# Patient Record
Sex: FEMALE | Race: WHITE | Employment: OTHER | URBAN - NONMETROPOLITAN AREA
[De-identification: names, ages, dates, MRNs, and addresses within clinical notes are randomized per-mention and may not be internally consistent; named-entity substitution may affect disease eponyms.]

---

## 2017-07-24 ENCOUNTER — OFFICE VISIT (OUTPATIENT)
Dept: FAMILY MEDICINE CLINIC | Age: 32
End: 2017-07-24
Payer: MEDICARE

## 2017-07-24 VITALS
SYSTOLIC BLOOD PRESSURE: 114 MMHG | WEIGHT: 211 LBS | HEART RATE: 74 BPM | HEIGHT: 64 IN | BODY MASS INDEX: 36.02 KG/M2 | DIASTOLIC BLOOD PRESSURE: 80 MMHG

## 2017-07-24 DIAGNOSIS — F90.2 ATTENTION DEFICIT HYPERACTIVITY DISORDER (ADHD), COMBINED TYPE: Primary | ICD-10-CM

## 2017-07-24 DIAGNOSIS — F17.200 CURRENT EVERY DAY SMOKER: ICD-10-CM

## 2017-07-24 DIAGNOSIS — F90.2 ATTENTION DEFICIT HYPERACTIVITY DISORDER (ADHD), COMBINED TYPE: ICD-10-CM

## 2017-07-24 PROCEDURE — G8427 DOCREV CUR MEDS BY ELIG CLIN: HCPCS | Performed by: FAMILY MEDICINE

## 2017-07-24 PROCEDURE — 4004F PT TOBACCO SCREEN RCVD TLK: CPT | Performed by: FAMILY MEDICINE

## 2017-07-24 PROCEDURE — 99203 OFFICE O/P NEW LOW 30 MIN: CPT | Performed by: FAMILY MEDICINE

## 2017-07-24 PROCEDURE — G8417 CALC BMI ABV UP PARAM F/U: HCPCS | Performed by: FAMILY MEDICINE

## 2017-07-24 RX ORDER — DEXTROAMPHETAMINE SACCHARATE, AMPHETAMINE ASPARTATE MONOHYDRATE, DEXTROAMPHETAMINE SULFATE AND AMPHETAMINE SULFATE 2.5; 2.5; 2.5; 2.5 MG/1; MG/1; MG/1; MG/1
10 CAPSULE, EXTENDED RELEASE ORAL DAILY
Qty: 30 CAPSULE | Refills: 0 | Status: SHIPPED | OUTPATIENT
Start: 2017-07-24 | End: 2017-08-22 | Stop reason: SDUPTHER

## 2017-07-24 RX ORDER — DEXTROAMPHETAMINE SACCHARATE, AMPHETAMINE ASPARTATE MONOHYDRATE, DEXTROAMPHETAMINE SULFATE AND AMPHETAMINE SULFATE 2.5; 2.5; 2.5; 2.5 MG/1; MG/1; MG/1; MG/1
10 CAPSULE, EXTENDED RELEASE ORAL DAILY
Qty: 30 CAPSULE | Refills: 0 | Status: SHIPPED | OUTPATIENT
Start: 2017-07-24 | End: 2017-07-24 | Stop reason: SDUPTHER

## 2017-07-24 ASSESSMENT — ENCOUNTER SYMPTOMS
COLOR CHANGE: 0
EYE DISCHARGE: 0
SHORTNESS OF BREATH: 0
CHEST TIGHTNESS: 0
VOMITING: 0
NAUSEA: 0
EYE REDNESS: 0

## 2017-08-22 DIAGNOSIS — F90.2 ATTENTION DEFICIT HYPERACTIVITY DISORDER (ADHD), COMBINED TYPE: ICD-10-CM

## 2017-08-22 RX ORDER — DEXTROAMPHETAMINE SACCHARATE, AMPHETAMINE ASPARTATE MONOHYDRATE, DEXTROAMPHETAMINE SULFATE AND AMPHETAMINE SULFATE 2.5; 2.5; 2.5; 2.5 MG/1; MG/1; MG/1; MG/1
10 CAPSULE, EXTENDED RELEASE ORAL DAILY
Qty: 30 CAPSULE | Refills: 0 | Status: SHIPPED | OUTPATIENT
Start: 2017-08-24 | End: 2017-09-22 | Stop reason: SDUPTHER

## 2017-09-22 DIAGNOSIS — F90.2 ATTENTION DEFICIT HYPERACTIVITY DISORDER (ADHD), COMBINED TYPE: ICD-10-CM

## 2017-09-22 RX ORDER — DEXTROAMPHETAMINE SACCHARATE, AMPHETAMINE ASPARTATE MONOHYDRATE, DEXTROAMPHETAMINE SULFATE AND AMPHETAMINE SULFATE 2.5; 2.5; 2.5; 2.5 MG/1; MG/1; MG/1; MG/1
10 CAPSULE, EXTENDED RELEASE ORAL DAILY
Qty: 30 CAPSULE | Refills: 0 | Status: SHIPPED | OUTPATIENT
Start: 2017-09-22 | End: 2017-10-20 | Stop reason: SDUPTHER

## 2017-10-02 ENCOUNTER — HOSPITAL ENCOUNTER (EMERGENCY)
Age: 32
Discharge: HOME | End: 2017-10-02
Attending: NURSE PRACTITIONER
Payer: MEDICARE

## 2017-10-02 VITALS
DIASTOLIC BLOOD PRESSURE: 76 MMHG | SYSTOLIC BLOOD PRESSURE: 110 MMHG | HEIGHT: 64 IN | BODY MASS INDEX: 33.63 KG/M2 | TEMPERATURE: 98.1 F | WEIGHT: 197 LBS | RESPIRATION RATE: 16 BRPM | OXYGEN SATURATION: 98 % | HEART RATE: 98 BPM

## 2017-10-02 DIAGNOSIS — J98.01 BRONCHOSPASM, ACUTE: ICD-10-CM

## 2017-10-02 DIAGNOSIS — J30.9 ALLERGIC RHINITIS, UNSPECIFIED ALLERGIC RHINITIS TRIGGER, UNSPECIFIED RHINITIS SEASONALITY: Primary | ICD-10-CM

## 2017-10-02 PROCEDURE — 99212 OFFICE O/P EST SF 10 MIN: CPT

## 2017-10-02 PROCEDURE — 99213 OFFICE O/P EST LOW 20 MIN: CPT | Performed by: NURSE PRACTITIONER

## 2017-10-02 RX ORDER — ALBUTEROL SULFATE 90 UG/1
2 AEROSOL, METERED RESPIRATORY (INHALATION) EVERY 4 HOURS PRN
Qty: 1 INHALER | Refills: 0 | Status: SHIPPED | OUTPATIENT
Start: 2017-10-02 | End: 2018-12-29 | Stop reason: ALTCHOICE

## 2017-10-02 RX ORDER — METHYLPREDNISOLONE 4 MG/1
TABLET ORAL
Qty: 1 KIT | Refills: 0 | Status: SHIPPED | OUTPATIENT
Start: 2017-10-02 | End: 2017-10-08

## 2017-10-02 ASSESSMENT — ENCOUNTER SYMPTOMS
SINUS PRESSURE: 0
NAUSEA: 0
VOMITING: 0
STRIDOR: 0
COUGH: 1
SORE THROAT: 0
WHEEZING: 0
CHEST TIGHTNESS: 1
DIARRHEA: 0
VOICE CHANGE: 1
TROUBLE SWALLOWING: 0
SHORTNESS OF BREATH: 1

## 2017-10-02 NOTE — ED PROVIDER NOTES
in her mother; Stroke in her father. SOCIAL HISTORY     Patient  reports that she has been smoking Cigarettes. She has been smoking about 0.50 packs per day. She has never used smokeless tobacco. She reports that she does not drink alcohol or use illicit drugs. PHYSICAL EXAM     ED TRIAGE VITALS  BP: 110/76, Temp: 98.1 °F (36.7 °C), Pulse: 98, Resp: 16, SpO2: 98 %  Physical Exam   Constitutional: She appears well-developed and well-nourished. No distress. HENT:   Head: Normocephalic and atraumatic. Right Ear: External ear normal. A middle ear effusion is present. Left Ear: External ear normal. A middle ear effusion is present. Nose: Mucosal edema present. Right sinus exhibits no maxillary sinus tenderness and no frontal sinus tenderness. Left sinus exhibits no maxillary sinus tenderness and no frontal sinus tenderness. Mouth/Throat: Uvula is midline and mucous membranes are normal. Posterior oropharyngeal edema present. No oropharyngeal exudate, posterior oropharyngeal erythema or tonsillar abscesses. Eyes: Conjunctivae are normal.   Neck: Neck supple. Cardiovascular: Normal rate and regular rhythm. Exam reveals no gallop and no friction rub. No murmur heard. Pulmonary/Chest: Effort normal and breath sounds normal. No respiratory distress. She has no wheezes. Lymphadenopathy:     She has no cervical adenopathy. Neurological: She is alert. Skin: Skin is warm and dry. Psychiatric: She has a normal mood and affect. Nursing note and vitals reviewed. DIAGNOSTIC RESULTS   Labs:No results found for this visit on 10/02/17. IMAGING:    URGENT CARE COURSE:     Vitals:    10/02/17 1214   BP: 110/76   Pulse: 98   Resp: 16   Temp: 98.1 °F (36.7 °C)   TempSrc: Oral   SpO2: 98%   Weight: 197 lb (89.4 kg)   Height: 5' 4\" (1.626 m)       Medications - No data to display  PROCEDURES:  None  FINAL IMPRESSION      1.  Allergic rhinitis, unspecified allergic rhinitis trigger, unspecified rhinitis seasonality    2. Bronchospasm, acute        DISPOSITION/PLAN   DISPOSITION Decision to Discharge   Patient presents with symptoms consistent with allergic rhinitis and bronchospasm. Patient was not wheezing but tells me she wakes up with tightness in her chest and it gets worse throughout the day. I talked with her about stopping tobacco use. She was receptive. I'm going to send her with prescriptions for an albuterol HFA and a Medrol Dosepak as directed. I suggested she follow-up with her primary care provider if symptoms don't improve or any other concerns. PATIENT REFERRED TO:  Helena Owen MD  [address] [place] [address]  [phone]    Schedule an appointment as soon as possible for a visit  if not improving    DISCHARGE MEDICATIONS:  New Prescriptions    ALBUTEROL SULFATE HFA (PROAIR HFA) 108 (90 BASE) MCG/ACT INHALER    Inhale 2 puffs into the lungs every 4 hours as needed for Wheezing    METHYLPREDNISOLONE (MEDROL, BRITTANY,) 4 MG TABLET    Take by mouth.      Current Discharge Medication List          AUDRA Chaparro, [address] [facility]  10/02/17 1129

## 2017-10-02 NOTE — ED AVS SNAPSHOT
After Visit Summary  (Discharge Instructions)    Medication List for Home    Based on the information you provided to us as well as any changes during this visit, the following is your updated medication list.  Compare this with your prescription bottles at home. If you have any questions or concerns, contact your primary care physician's office. Daily Medication List (This medication list can be shared with any Healthcare provider who is helping you manage your medications)      There are NEW medications for you. START taking them after you leave the hospital     albuterol sulfate  (90 Base) MCG/ACT inhaler   Commonly known as:  PROAIR HFA   Inhale 2 puffs into the lungs every 4 hours as needed for Wheezing       methylPREDNISolone 4 MG tablet   Commonly known as:  MEDROL (BRITTANY)   Take by mouth. These are medications you told us you were taking at home, CONTINUE taking them after you leave the hospital     amphetamine-dextroamphetamine 10 MG extended release capsule   Commonly known as:  ADDERALL XR   Take 1 capsule by mouth daily . ASK your doctor about these medications if you have questions     CYMBALTA 60 MG extended release capsule   Generic drug:  DULoxetine   Take 60 mg by mouth 2 times daily            Where to Get Your Medications      You can get these medications from any pharmacy     Bring a paper prescription for each of these medications     albuterol sulfate  (90 Base) MCG/ACT inhaler    methylPREDNISolone 4 MG tablet               Allergies as of 10/2/2017        Reactions    Percocet [Oxycodone-acetaminophen] Other (See Comments)    Migraines with percocet      Immunizations as of 10/2/2017     Name Date Dose VIS Date Route    Tetanus 1/1/2010 -- -- --    External: Patient reported    Comment: exact date unknown         After Visit Summary    This summary was created for you. Thank you for entrusting your care to us.   The following information If you have questions, please contact the physician practice where you receive care. Remember, MyChart is NOT to be used for urgent needs. For medical emergencies, dial 911. For questions regarding your MyChart account call [phone]. If you have a clinical question, please call your doctor's office. View your information online  ? Review your current list of  medications, immunization, and allergies. ? Review your future test results online . ? Review your discharge instructions provided by your caregivers at discharge    Certain functionality such as prescription refills, scheduling appointments or sending messages to your provider are not activated if your provider does not use [facility] in his/her office    For questions regarding your MyChart account call [phone]. If you have a clinical question, please call your doctor's office. The information on all pages of the After Visit Summary has been reviewed with me, the patient and/or responsible adult, by my health care provider(s). I had the opportunity to ask questions regarding this information. I understand I should dispose of my armband safely at home to protect my health information. A complete copy of the After Visit Summary has been given to me, the patient and/or responsible adult. Patient Signature/Responsible Adult: ___________________________________    Nurse Signature: ___________________________________  Resident/MLP Signature: ___________________________________  Attending Signature: ___________________________________    Date:____________Time:____________              Discharge Instructions            Allergies: Care Instructions  Your Care Instructions  Allergies occur when your body's defense system (immune system) overreacts to certain substances. The immune system treats a harmless substance as if it were a harmful germ or virus.  Many things can cause this overreaction, Cockroaches like areas where grocery bags, newspapers, empty bottles, or cardboard boxes are stored. Do not keep these inside your home, and keep trash and food containers sealed. Seal off any spots where cockroaches might enter your home. · If you are allergic to mold, get rid of furniture, rugs, and drapes that smell musty. Check for mold in the bathroom. · If you are allergic to outdoor pollen or mold spores, use air-conditioning. Change or clean all filters every month. Keep windows closed. · If you are allergic to pollen, stay inside when pollen counts are high. Use a vacuum  with a HEPA filter or a double-thickness filter at least two times each week. · Stay inside when air pollution is bad. Avoid paint fumes, perfumes, and other strong odors. · Avoid conditions that make your allergies worse. Stay away from smoke. Do not smoke or let anyone else smoke in your house. Do not use fireplaces or wood-burning stoves. · If you are allergic to your pets, change the air filter in your furnace every month. Use high-efficiency filters. · If you are allergic to pet dander, keep pets outside or out of your bedroom. Old carpet and cloth furniture can hold a lot of animal dander. You may need to replace them. When should you call for help? Give an epinephrine shot if:  · You think you are having a severe allergic reaction. · You have symptoms in more than one body area, such as mild nausea and an itchy mouth. After giving an epinephrine shot call 911, even if you feel better. Call 911 if:  · You have symptoms of a severe allergic reaction. These may include:  ¨ Sudden raised, red areas (hives) all over your body. ¨ Swelling of the throat, mouth, lips, or tongue. ¨ Trouble breathing. ¨ Passing out (losing consciousness). Or you may feel very lightheaded or suddenly feel weak, confused, or restless. · You have been given an epinephrine shot, even if you feel better. Call your doctor now or seek immediate medical care if:  · You have symptoms of an allergic reaction, such as:  ¨ A rash or hives (raised, red areas on the skin). ¨ Itching. ¨ Swelling. ¨ Belly pain, nausea, or vomiting. Watch closely for changes in your health, and be sure to contact your doctor if:  · You do not get better as expected. Where can you learn more? Go to https://TelirispepicewLookAcross.Premise. org and sign in to your [employer] account. Enter Z732 in the Encite box to learn more about \"Allergies: Care Instructions. \"     If you do not have an account, please click on the \"Sign Up Now\" link. Current as of: April 3, 2017  Content Version: 11.3  © 6648-7874 [employer]. Care instructions adapted under license by [facility] ([facility]). If you have questions about a medical condition or this instruction, always ask your healthcare professional. [place] [address] any warranty or liability for your use of this information. More Information           Wheezing or Bronchoconstriction: Care Instructions  Your Care Instructions  Wheezing is a whistling noise made during breathing. It occurs when the small airways, or bronchial tubes, that lead to your lungs swell or contract (spasm) and become narrow. This narrowing is called bronchoconstriction. When your airways constrict, it is hard for air to pass through and this makes it hard for you to breathe. Wheezing and bronchoconstriction can be caused by many problems, including:  · An infection such as the flu or a cold. · Allergies such as hay fever. · Diseases such as asthma or chronic obstructive pulmonary disease. · Smoking. Treatment for your wheezing depends on what is causing the problem. Your wheezing may get better without treatment. But you may need to pay attention to things that cause your wheezing and avoid them.  Or you may need medicine to help treat the wheezing and to reduce the swelling or to relieve spasms in your lungs. Follow-up care is a key part of your treatment and safety. Be sure to make and go to all appointments, and call your doctor if you are having problems. It is also a good idea to know your test results and keep a list of the medicines you take. How can you care for yourself at home? · Take your medicine exactly as prescribed. Call your doctor if you think you are having a problem with your medicine. You will get more details on the specific medicine your doctor prescribes. · If your doctor prescribed antibiotics, take them as directed. Do not stop taking them just because you feel better. You need to take the full course of antibiotics. · Breathe moist air from a humidifier, hot shower, or sink filled with hot water. This may help ease your symptoms and make it easier for you to breathe. · If you have congestion in your nose and throat, drinking plenty of fluids, especially hot fluids, may help relieve your symptoms. If you have kidney, heart, or liver disease and have to limit fluids, talk with your doctor before you increase the amount of fluids you drink. · If you have mucus in your airways, it may help to breathe deeply and cough. · Do not smoke or allow others to smoke around you. Smoking can make your wheezing worse. If you need help quitting, talk to your doctor about stop-smoking programs and medicines. These can increase your chances of quitting for good. · Avoid things that may cause your wheezing. These may include colds, smoke, air pollution, dust, pollen, pets, cockroaches, stress, and cold air. When should you call for help? Call 911 anytime you think you may need emergency care. For example, call if:  · You have severe trouble breathing. · You passed out (lost consciousness). Call your doctor now or seek immediate medical care if:  · You cough up yellow, dark brown, or bloody mucus (sputum). · You have new or worse shortness of breath.

## 2017-10-03 ENCOUNTER — CARE COORDINATION (OUTPATIENT)
Dept: FAMILY MEDICINE CLINIC | Age: 32
End: 2017-10-03

## 2017-10-03 NOTE — CARE COORDINATION
I left a voicemail stating this is [place] at PCP office. Please call at your earliest convenience, [phone].

## 2017-10-20 DIAGNOSIS — F90.2 ATTENTION DEFICIT HYPERACTIVITY DISORDER (ADHD), COMBINED TYPE: ICD-10-CM

## 2017-10-20 RX ORDER — DEXTROAMPHETAMINE SACCHARATE, AMPHETAMINE ASPARTATE MONOHYDRATE, DEXTROAMPHETAMINE SULFATE AND AMPHETAMINE SULFATE 2.5; 2.5; 2.5; 2.5 MG/1; MG/1; MG/1; MG/1
10 CAPSULE, EXTENDED RELEASE ORAL DAILY
Qty: 30 CAPSULE | Refills: 0 | Status: SHIPPED | OUTPATIENT
Start: 2017-10-20 | End: 2017-11-17 | Stop reason: SDUPTHER

## 2017-10-20 NOTE — TELEPHONE ENCOUNTER
Xiomy Grossman called requesting a refill on the following medications:  Requested Prescriptions     Pending Prescriptions Disp Refills    amphetamine-dextroamphetamine (ADDERALL XR) 10 MG extended release capsule 30 capsule 0     Sig: Take 1 capsule by mouth daily .      Pharmacy verified:  [facility]       Date of last visit: 07-24-17  Date of next visit (if applicable): 17/17/3849        Date of last fill and quantity (to be completed by clinical staff)  Pharm[facility] name: [facility]

## 2017-11-17 DIAGNOSIS — F90.2 ATTENTION DEFICIT HYPERACTIVITY DISORDER (ADHD), COMBINED TYPE: ICD-10-CM

## 2017-11-17 RX ORDER — DEXTROAMPHETAMINE SACCHARATE, AMPHETAMINE ASPARTATE MONOHYDRATE, DEXTROAMPHETAMINE SULFATE AND AMPHETAMINE SULFATE 2.5; 2.5; 2.5; 2.5 MG/1; MG/1; MG/1; MG/1
10 CAPSULE, EXTENDED RELEASE ORAL DAILY
Qty: 30 CAPSULE | Refills: 0 | Status: SHIPPED | OUTPATIENT
Start: 2017-11-20 | End: 2017-12-11 | Stop reason: SDUPTHER

## 2017-11-17 NOTE — TELEPHONE ENCOUNTER
11/17/17   Kisha Echeverria called requesting a refill on the following medications:  Requested Prescriptions     Pending Prescriptions Disp Refills    amphetamine-dextroamphetamine (ADDERALL XR) 10 MG extended release capsule 30 capsule 0     Sig: Take 1 capsule by mouth daily .      Pharmacy verified:  [email or web address]      Date of last visit:   7/24/17  Date of next visit (if applicable): 29/51/72  blm

## 2017-11-17 NOTE — TELEPHONE ENCOUNTER
Ramakrishna Spence called requesting a refill on the following medications:  Requested Prescriptions     Pending Prescriptions Disp Refills    amphetamine-dextroamphetamine (ADDERALL XR) 10 MG extended release capsule 30 capsule 0     Sig: Take 1 capsule by mouth daily .  Earliest Fill Date: 11/17/17       Date of last visit: Visit date not found  Date of next visit (if applicable):12/11/2017  Date of last refill: 10/20/17#30 x NR  Pharmacy Name: [facility], [place], [facility] ([address] [place] [address])

## 2017-12-11 ENCOUNTER — OFFICE VISIT (OUTPATIENT)
Dept: FAMILY MEDICINE CLINIC | Age: 32
End: 2017-12-11
Payer: MEDICARE

## 2017-12-11 VITALS
HEIGHT: 64 IN | SYSTOLIC BLOOD PRESSURE: 118 MMHG | DIASTOLIC BLOOD PRESSURE: 82 MMHG | BODY MASS INDEX: 38.55 KG/M2 | HEART RATE: 62 BPM | WEIGHT: 225.8 LBS

## 2017-12-11 DIAGNOSIS — F90.2 ATTENTION DEFICIT HYPERACTIVITY DISORDER (ADHD), COMBINED TYPE: Primary | ICD-10-CM

## 2017-12-11 DIAGNOSIS — F31.62 BIPOLAR DISORDER, CURRENT EPISODE MIXED, MODERATE (HCC): ICD-10-CM

## 2017-12-11 PROCEDURE — G8484 FLU IMMUNIZE NO ADMIN: HCPCS | Performed by: FAMILY MEDICINE

## 2017-12-11 PROCEDURE — 99214 OFFICE O/P EST MOD 30 MIN: CPT | Performed by: FAMILY MEDICINE

## 2017-12-11 PROCEDURE — G8417 CALC BMI ABV UP PARAM F/U: HCPCS | Performed by: FAMILY MEDICINE

## 2017-12-11 PROCEDURE — 4004F PT TOBACCO SCREEN RCVD TLK: CPT | Performed by: FAMILY MEDICINE

## 2017-12-11 PROCEDURE — G8427 DOCREV CUR MEDS BY ELIG CLIN: HCPCS | Performed by: FAMILY MEDICINE

## 2017-12-11 RX ORDER — DEXTROAMPHETAMINE SACCHARATE, AMPHETAMINE ASPARTATE MONOHYDRATE, DEXTROAMPHETAMINE SULFATE AND AMPHETAMINE SULFATE 3.75; 3.75; 3.75; 3.75 MG/1; MG/1; MG/1; MG/1
15 CAPSULE, EXTENDED RELEASE ORAL DAILY
Qty: 30 CAPSULE | Refills: 0 | Status: SHIPPED | OUTPATIENT
Start: 2017-12-20 | End: 2018-01-12 | Stop reason: ALTCHOICE

## 2017-12-11 ASSESSMENT — PATIENT HEALTH QUESTIONNAIRE - PHQ9
SUM OF ALL RESPONSES TO PHQ QUESTIONS 1-9: 13
9. THOUGHTS THAT YOU WOULD BE BETTER OFF DEAD, OR OF HURTING YOURSELF: 0
2. FEELING DOWN, DEPRESSED OR HOPELESS: 2
5. POOR APPETITE OR OVEREATING: 2
10. IF YOU CHECKED OFF ANY PROBLEMS, HOW DIFFICULT HAVE THESE PROBLEMS MADE IT FOR YOU TO DO YOUR WORK, TAKE CARE OF THINGS AT HOME, OR GET ALONG WITH OTHER PEOPLE: 3
4. FEELING TIRED OR HAVING LITTLE ENERGY: 0
SUM OF ALL RESPONSES TO PHQ9 QUESTIONS 1 & 2: 4
3. TROUBLE FALLING OR STAYING ASLEEP: 2
7. TROUBLE CONCENTRATING ON THINGS, SUCH AS READING THE NEWSPAPER OR WATCHING TELEVISION: 2
8. MOVING OR SPEAKING SO SLOWLY THAT OTHER PEOPLE COULD HAVE NOTICED. OR THE OPPOSITE, BEING SO FIGETY OR RESTLESS THAT YOU HAVE BEEN MOVING AROUND A LOT MORE THAN USUAL: 1
6. FEELING BAD ABOUT YOURSELF - OR THAT YOU ARE A FAILURE OR HAVE LET YOURSELF OR YOUR FAMILY DOWN: 2
1. LITTLE INTEREST OR PLEASURE IN DOING THINGS: 2

## 2017-12-11 ASSESSMENT — ENCOUNTER SYMPTOMS
EYE DISCHARGE: 0
CHEST TIGHTNESS: 0
NAUSEA: 0
COLOR CHANGE: 0
SHORTNESS OF BREATH: 0
VOMITING: 0
EYE REDNESS: 0

## 2017-12-11 NOTE — PROGRESS NOTES
reviewed. Assessment/Plan:     Yudelka Yeh was seen today for 3 month follow-up. Diagnoses and all orders for this visit:    Attention deficit hyperactivity disorder (ADHD), combined type  -     amphetamine-dextroamphetamine (ADDERALL XR) 15 MG extended release capsule; Take 1 capsule by mouth daily . Bipolar disorder, current episode mixed, moderate ([address]  -     [address] Julio Carbajal MD    Increased dose of Adderall to help with ADHD symptoms. Referral given to Psychiatry as patient has a complex history with her bipolar disorder and has failed several medication regimens. Controlled substances monitoring: possible medication side effects, risk of tolerance and/or dependence, and alternative treatments discussed, no signs of potential drug abuse or diversion identified and OARRS report reviewed today- activity consistent with treatment plan. Return in about 3 months (around 3/11/2018) for ADHD.     Electronically signed by Dominga Guillen MD on 12/11/2017 at 3:22 PM

## 2017-12-12 ENCOUNTER — TELEPHONE (OUTPATIENT)
Dept: FAMILY MEDICINE CLINIC | Age: 32
End: 2017-12-12

## 2017-12-21 ENCOUNTER — TELEPHONE (OUTPATIENT)
Dept: PSYCHIATRY | Age: 32
End: 2017-12-21

## 2017-12-21 NOTE — TELEPHONE ENCOUNTER
Provider [facility] is willing to see patient spoke with patient ok with seeing provider appt scheduled

## 2018-01-12 ENCOUNTER — OFFICE VISIT (OUTPATIENT)
Dept: PSYCHIATRY | Age: 33
End: 2018-01-12
Payer: MEDICARE

## 2018-01-12 DIAGNOSIS — F90.2 ATTENTION DEFICIT HYPERACTIVITY DISORDER (ADHD), COMBINED TYPE: ICD-10-CM

## 2018-01-12 DIAGNOSIS — F41.1 GAD (GENERALIZED ANXIETY DISORDER): ICD-10-CM

## 2018-01-12 DIAGNOSIS — F31.63 BIPOLAR DISORDER, CURRENT EPISODE MIXED, SEVERE, WITHOUT PSYCHOTIC FEATURES (HCC): Primary | ICD-10-CM

## 2018-01-12 PROCEDURE — G8484 FLU IMMUNIZE NO ADMIN: HCPCS | Performed by: PHYSICIAN ASSISTANT

## 2018-01-12 PROCEDURE — G8417 CALC BMI ABV UP PARAM F/U: HCPCS | Performed by: PHYSICIAN ASSISTANT

## 2018-01-12 PROCEDURE — G8427 DOCREV CUR MEDS BY ELIG CLIN: HCPCS | Performed by: PHYSICIAN ASSISTANT

## 2018-01-12 PROCEDURE — 4004F PT TOBACCO SCREEN RCVD TLK: CPT | Performed by: PHYSICIAN ASSISTANT

## 2018-01-12 PROCEDURE — 99204 OFFICE O/P NEW MOD 45 MIN: CPT | Performed by: PHYSICIAN ASSISTANT

## 2018-01-12 RX ORDER — TRAZODONE HYDROCHLORIDE 50 MG/1
TABLET ORAL
Qty: 60 TABLET | Refills: 0 | Status: SHIPPED | OUTPATIENT
Start: 2018-01-12 | End: 2018-02-09 | Stop reason: SDUPTHER

## 2018-01-12 RX ORDER — GABAPENTIN 100 MG/1
CAPSULE ORAL
Qty: 9 CAPSULE | Refills: 0 | Status: SHIPPED | OUTPATIENT
Start: 2018-01-12 | End: 2018-02-09 | Stop reason: ALTCHOICE

## 2018-01-12 RX ORDER — METHYLPHENIDATE HYDROCHLORIDE 27 MG/1
27 TABLET ORAL EVERY MORNING
Qty: 30 TABLET | Refills: 0 | Status: SHIPPED | OUTPATIENT
Start: 2018-01-12 | End: 2018-02-09 | Stop reason: SDUPTHER

## 2018-01-12 RX ORDER — GABAPENTIN 300 MG/1
CAPSULE ORAL
Qty: 180 CAPSULE | Refills: 0 | Status: SHIPPED | OUTPATIENT
Start: 2018-01-12 | End: 2018-02-09 | Stop reason: SDUPTHER

## 2018-01-12 NOTE — PROGRESS NOTES
[facility]  Physician Assistant Psychiatric Assessment       CHIEF COMPLAINT:  ADHD, BD    History obtained from:  patient, electronic medical record    HISTORY OF PRESENT ILLNESS:    Elaine Camacho is a 28 y.o. female with significant history of ADHD, BD who presents to the office today as a new patient to establish care. She only recently was diagnosed with BD, has never taken mood stabilizers. She currently feels like she is becoming hypomanic; decreased sleep (very little sleep over 3 days), elevated mood, racing thoughts. she has a history of spending sprees and increased productivity during these times. She also has depressive episodes with anhedonia, depressed mood, difficulty concentrating, fatigue, hypersomnia. Patient denies recurrent thoughts of death, denies suicidal ideations She     Anxiety increasing since last year, frequent now, feels like some something heavy on her chest.  She feels that the Adderall helps her focus, however her anxiety increased 'i'ts a different kind of anxiety, I can tell\". She has taken ritalin before and did really well on it. She describes her ADHD as difficulty focusing and concentrating, easily distracible, inattention, hyperactivity, impulsivity. She denies hallucinations, denies use of drugs or alcohol.       PSYCHIATRIC HISTORY:      Outpatient treatment: PCP  Suicide Attempts: no  Inpatient Psychiatric Admission: no    Past Psychiatric Meds:     Adderall, ritalin   Xanax, Vistaril, Ativan  Cymblata, Paxil, Prozac, Wellbutrin, Cymbalta, Pamelor, Lexapro  Seroquel    Adverse reactions from psychotropic medications:      Xanax, Vistaril, Ativan = all caused her to fall asleep  Adderall -  increase in anxiety      Lifetime Psychiatric Review of Systems     ·    Obsessions and Compulsions: no    ·    Elisabeth or Hypomania: yes   ·    Hallucinations: no  ·    Panic Attacks:  yes   ·    Delusions:  no  ·    Phobias: no        Past Medical History: Diagnosis Date    ADHD (attention deficit hyperactivity disorder)     Chiari malformation     Depression     Fibromyalgia     Pseudotumor cerebri    back pain -  multiple bulging discs   Asthma     Past Surgical History:        Procedure Laterality Date    CHOLECYSTECTOMY  2007    OTHER SURGICAL HISTORY  2007    lumbar shunt/ shunt    OTHER SURGICAL HISTORY      chiari decompression    VENTRICULOPERITONEAL SHUNT     tubal ligation - 2010    Current Meds    Current Outpatient Prescriptions:     amphetamine-dextroamphetamine (ADDERALL XR) 15 MG extended release capsule, Take 1 capsule by mouth daily . , Disp: 30 capsule, Rfl: 0    albuterol sulfate HFA (PROAIR HFA) 108 (90 Base) MCG/ACT inhaler, Inhale 2 puffs into the lungs every 4 hours as needed for Wheezing, Disp: 1 Inhaler, Rfl: 0     Allergies:  Percocet [oxycodone-acetaminophen]    Social History:     TOBACCO: 1/2 ppd  ETOH:  denies  DRUGS: denies   MARITAL STATUS: , 3 children  OCCUPATION: home health. Recently SSD for pseudotumor cerebri and fortino  LEVEL OF EDUCATION: starting 2nd year of college studying criminal justice, intends to go to law school  RESIDENCE: Currently lives in [place] with her mother       Family Medical and Psychiatric History:     Son - diagnosed with BD  Suspects BD also in mother, grandmother, and father.          Problem Relation Age of Onset    Hypertension Mother     Alzheimer's Disease Father     Stroke Father     Heart Disease Father          Mental Status Exam  Level of consciousness:  Within normal limits  Appearance: Street clothes, seated on couch  Behavior/Motor:hyperactive  Attitude toward examiner:  Cooperative, attentive, good eye contact  Speech:  Fast, normal volume  Mood:  hypomanic  Affect:  mood congruent  Thought processes:  linear, goal directed and coherent  Thought content:  Denies suicidal or homicidal ideations, denies hallucinations, denies delusions, does not appear to be

## 2018-02-09 ENCOUNTER — OFFICE VISIT (OUTPATIENT)
Dept: PSYCHIATRY | Age: 33
End: 2018-02-09
Payer: MEDICARE

## 2018-02-09 DIAGNOSIS — F90.2 ATTENTION DEFICIT HYPERACTIVITY DISORDER (ADHD), COMBINED TYPE: ICD-10-CM

## 2018-02-09 DIAGNOSIS — F31.63 BIPOLAR DISORDER, CURRENT EPISODE MIXED, SEVERE, WITHOUT PSYCHOTIC FEATURES (HCC): Primary | ICD-10-CM

## 2018-02-09 DIAGNOSIS — F41.1 GAD (GENERALIZED ANXIETY DISORDER): ICD-10-CM

## 2018-02-09 PROCEDURE — G8427 DOCREV CUR MEDS BY ELIG CLIN: HCPCS | Performed by: PHYSICIAN ASSISTANT

## 2018-02-09 PROCEDURE — 99213 OFFICE O/P EST LOW 20 MIN: CPT | Performed by: PHYSICIAN ASSISTANT

## 2018-02-09 PROCEDURE — G8484 FLU IMMUNIZE NO ADMIN: HCPCS | Performed by: PHYSICIAN ASSISTANT

## 2018-02-09 PROCEDURE — G8417 CALC BMI ABV UP PARAM F/U: HCPCS | Performed by: PHYSICIAN ASSISTANT

## 2018-02-09 PROCEDURE — 4004F PT TOBACCO SCREEN RCVD TLK: CPT | Performed by: PHYSICIAN ASSISTANT

## 2018-02-09 RX ORDER — METHYLPHENIDATE HYDROCHLORIDE 27 MG/1
27 TABLET ORAL EVERY MORNING
Qty: 30 TABLET | Refills: 0 | Status: SHIPPED | OUTPATIENT
Start: 2018-02-09 | End: 2018-03-12

## 2018-02-09 RX ORDER — TRAZODONE HYDROCHLORIDE 150 MG/1
150 TABLET ORAL NIGHTLY PRN
Qty: 30 TABLET | Refills: 1 | Status: SHIPPED | OUTPATIENT
Start: 2018-02-09 | End: 2018-05-03 | Stop reason: SDUPTHER

## 2018-02-09 RX ORDER — GABAPENTIN 400 MG/1
CAPSULE ORAL
Qty: 90 CAPSULE | Refills: 1 | Status: SHIPPED | OUTPATIENT
Start: 2018-02-09 | End: 2018-04-03 | Stop reason: SDUPTHER

## 2018-02-19 ENCOUNTER — HOSPITAL ENCOUNTER (EMERGENCY)
Age: 33
Discharge: HOME | End: 2018-02-19
Payer: MEDICARE

## 2018-02-19 VITALS
OXYGEN SATURATION: 98 % | TEMPERATURE: 98.3 F | DIASTOLIC BLOOD PRESSURE: 61 MMHG | HEART RATE: 79 BPM | RESPIRATION RATE: 16 BRPM | SYSTOLIC BLOOD PRESSURE: 117 MMHG

## 2018-02-19 DIAGNOSIS — G89.29 ACUTE EXACERBATION OF CHRONIC LOW BACK PAIN: Primary | ICD-10-CM

## 2018-02-19 DIAGNOSIS — M54.50 ACUTE EXACERBATION OF CHRONIC LOW BACK PAIN: Primary | ICD-10-CM

## 2018-02-19 PROCEDURE — 6360000002 HC RX W HCPCS: Performed by: NURSE PRACTITIONER

## 2018-02-19 PROCEDURE — 99213 OFFICE O/P EST LOW 20 MIN: CPT | Performed by: NURSE PRACTITIONER

## 2018-02-19 PROCEDURE — 99212 OFFICE O/P EST SF 10 MIN: CPT

## 2018-02-19 PROCEDURE — 96372 THER/PROPH/DIAG INJ SC/IM: CPT

## 2018-02-19 RX ORDER — KETOROLAC TROMETHAMINE 30 MG/ML
30 INJECTION, SOLUTION INTRAMUSCULAR; INTRAVENOUS ONCE
Status: COMPLETED | OUTPATIENT
Start: 2018-02-19 | End: 2018-02-19

## 2018-02-19 RX ORDER — PREDNISONE 20 MG/1
20 TABLET ORAL 2 TIMES DAILY
Qty: 10 TABLET | Refills: 0 | Status: SHIPPED | OUTPATIENT
Start: 2018-02-19 | End: 2018-02-24

## 2018-02-19 RX ADMIN — KETOROLAC TROMETHAMINE 30 MG: 30 INJECTION, SOLUTION INTRAMUSCULAR at 15:03

## 2018-02-19 ASSESSMENT — PAIN SCALES - GENERAL
PAINLEVEL_OUTOF10: 5
PAINLEVEL_OUTOF10: 5

## 2018-02-19 ASSESSMENT — PAIN DESCRIPTION - LOCATION: LOCATION: BACK

## 2018-02-19 ASSESSMENT — ENCOUNTER SYMPTOMS
SORE THROAT: 0
NAUSEA: 0
COUGH: 0
CONSTIPATION: 0
DIARRHEA: 0
SHORTNESS OF BREATH: 0
ABDOMINAL PAIN: 0
BACK PAIN: 1
WHEEZING: 0

## 2018-02-19 ASSESSMENT — PAIN DESCRIPTION - FREQUENCY: FREQUENCY: CONTINUOUS

## 2018-02-19 ASSESSMENT — PAIN DESCRIPTION - ORIENTATION: ORIENTATION: RIGHT

## 2018-02-19 ASSESSMENT — PAIN DESCRIPTION - PAIN TYPE: TYPE: ACUTE PAIN

## 2018-02-19 ASSESSMENT — PAIN DESCRIPTION - DESCRIPTORS: DESCRIPTORS: ACHING;DULL

## 2018-02-19 NOTE — ED TRIAGE NOTES
Patient walked to room 5 for left lower back pain radiating down her right lower leg. No other needs.

## 2018-02-19 NOTE — ED PROVIDER NOTES
02/19/18. IMAGING:    URGENT CARE COURSE:     Vitals:    02/19/18 1453   BP: 117/61   Pulse: 79   Resp: 16   Temp: 98.3 °F (36.8 °C)   TempSrc: Oral   SpO2: 98%     Patient is in no acute distress on presentation. Vital signs are normal.  Patient has an ear bud in her ear and she is talking on her phone. She smiles while she talks. Right lumbar tenderness to palpation, increased pain with twisting and bending of her trunk. Denies cauda equina symptoms. Medications   ketorolac (TORADOL) injection 30 mg (not administered)     PROCEDURES:  None  FINAL IMPRESSION      1. Acute exacerbation of chronic low back pain        DISPOSITION/PLAN   DISPOSITION Decision To Discharge 02/19/2018 02:59:01 PM    Patient is given a Toradol injection while in the urgent care and a prednisone burst Prescription. Follow up with PCP as needed.     PATIENT REFERRED TO:  Alcira Foy MD  [address] [place] [address]-  [phone]    In 3 days  If symptoms worsen    DISCHARGE MEDICATIONS:  New Prescriptions    PREDNISONE (DELTASONE) 20 MG TABLET    Take 1 tablet by mouth 2 times daily for 5 days     Current Discharge Medication List          RIVER Lugo NP  02/19/18 9948

## 2018-03-09 ENCOUNTER — OFFICE VISIT (OUTPATIENT)
Dept: PSYCHIATRY | Age: 33
End: 2018-03-09
Payer: MEDICARE

## 2018-03-09 DIAGNOSIS — F90.2 ATTENTION DEFICIT HYPERACTIVITY DISORDER (ADHD), COMBINED TYPE: ICD-10-CM

## 2018-03-09 DIAGNOSIS — F41.1 GAD (GENERALIZED ANXIETY DISORDER): ICD-10-CM

## 2018-03-09 DIAGNOSIS — F31.63 BIPOLAR DISORDER, CURRENT EPISODE MIXED, SEVERE, WITHOUT PSYCHOTIC FEATURES (HCC): Primary | ICD-10-CM

## 2018-03-09 PROCEDURE — G8417 CALC BMI ABV UP PARAM F/U: HCPCS | Performed by: PHYSICIAN ASSISTANT

## 2018-03-09 PROCEDURE — 4004F PT TOBACCO SCREEN RCVD TLK: CPT | Performed by: PHYSICIAN ASSISTANT

## 2018-03-09 PROCEDURE — G8484 FLU IMMUNIZE NO ADMIN: HCPCS | Performed by: PHYSICIAN ASSISTANT

## 2018-03-09 PROCEDURE — 99213 OFFICE O/P EST LOW 20 MIN: CPT | Performed by: PHYSICIAN ASSISTANT

## 2018-03-09 PROCEDURE — G8427 DOCREV CUR MEDS BY ELIG CLIN: HCPCS | Performed by: PHYSICIAN ASSISTANT

## 2018-03-09 RX ORDER — METHYLPHENIDATE HYDROCHLORIDE 20 MG/1
20 TABLET ORAL 2 TIMES DAILY
Qty: 60 TABLET | Refills: 0 | Status: SHIPPED | OUTPATIENT
Start: 2018-03-09 | End: 2018-04-06 | Stop reason: SDUPTHER

## 2018-03-12 ENCOUNTER — OFFICE VISIT (OUTPATIENT)
Dept: FAMILY MEDICINE CLINIC | Age: 33
End: 2018-03-12
Payer: MEDICARE

## 2018-03-12 VITALS
DIASTOLIC BLOOD PRESSURE: 70 MMHG | HEART RATE: 64 BPM | BODY MASS INDEX: 38.07 KG/M2 | HEIGHT: 64 IN | WEIGHT: 223 LBS | SYSTOLIC BLOOD PRESSURE: 110 MMHG

## 2018-03-12 DIAGNOSIS — R53.83 FATIGUE, UNSPECIFIED TYPE: ICD-10-CM

## 2018-03-12 DIAGNOSIS — M54.41 CHRONIC MIDLINE LOW BACK PAIN WITH RIGHT-SIDED SCIATICA: ICD-10-CM

## 2018-03-12 DIAGNOSIS — F31.62 BIPOLAR DISORDER, CURRENT EPISODE MIXED, MODERATE (HCC): ICD-10-CM

## 2018-03-12 DIAGNOSIS — G89.29 CHRONIC MIDLINE LOW BACK PAIN WITH RIGHT-SIDED SCIATICA: ICD-10-CM

## 2018-03-12 DIAGNOSIS — F90.2 ATTENTION DEFICIT HYPERACTIVITY DISORDER (ADHD), COMBINED TYPE: Primary | ICD-10-CM

## 2018-03-12 PROCEDURE — G8417 CALC BMI ABV UP PARAM F/U: HCPCS | Performed by: FAMILY MEDICINE

## 2018-03-12 PROCEDURE — G8427 DOCREV CUR MEDS BY ELIG CLIN: HCPCS | Performed by: FAMILY MEDICINE

## 2018-03-12 PROCEDURE — 4004F PT TOBACCO SCREEN RCVD TLK: CPT | Performed by: FAMILY MEDICINE

## 2018-03-12 PROCEDURE — G8484 FLU IMMUNIZE NO ADMIN: HCPCS | Performed by: FAMILY MEDICINE

## 2018-03-12 PROCEDURE — 99214 OFFICE O/P EST MOD 30 MIN: CPT | Performed by: FAMILY MEDICINE

## 2018-03-12 ASSESSMENT — ENCOUNTER SYMPTOMS
COLOR CHANGE: 0
BACK PAIN: 1
COUGH: 0
SHORTNESS OF BREATH: 0
BOWEL INCONTINENCE: 0

## 2018-03-12 NOTE — PATIENT INSTRUCTIONS
Up Now\" link. Current as of: March 21, 2017  Content Version: 11.5  © 9835-9971 [employer]. Care instructions adapted under license by [facility] ([facility]). If you have questions about a medical condition or this instruction, always ask your healthcare professional. [address] any warranty or liability for your use of this information.

## 2018-03-12 NOTE — PROGRESS NOTES
[address] [place] [address], [address]  Phone:  [phone]  Fax:  [phone]       Name: Darian Simms  : 1985    Chief Complaint   Patient presents with    ADHD    Depression       HPI:     Darian Simms is a 35 y.o. female who presents today for 3 month follow-up of ADHD and depression. At her last appointment in December she was referred to Psychiatry and was last seen there on Friday. She is feeling less anxious, having more energy, and is sleeping better (with Trazodone). The Concerta was helping to keep her focused, but wears off by 5:00 pm so was changed to Ritalin 20 mg BID (they are prescribing it). She will follow up with Psych in 8 weeks. Back Pain   This is a chronic problem. The current episode started more than 1 year ago. The problem occurs intermittently. The problem has been gradually worsening since onset. The pain is present in the lumbar spine. The quality of the pain is described as aching. The pain radiates to the right thigh. The pain is moderate. The symptoms are aggravated by position. Pertinent negatives include no bladder incontinence, bowel incontinence, chest pain, pelvic pain or weakness. She has tried NSAIDs, ice and heat for the symptoms. Current Outpatient Prescriptions:     methylphenidate (RITALIN) 20 MG tablet, Take 1 tablet by mouth 2 times daily for 30 days. , Disp: 60 tablet, Rfl: 0    gabapentin (NEURONTIN) 400 MG capsule, Take one capsule three times per day., Disp: 90 capsule, Rfl: 1    lurasidone (LATUDA) 80 MG TABS tablet, Take 1 tablet by mouth Daily with supper, Disp: 30 tablet, Rfl: 1    traZODone (DESYREL) 150 MG tablet, Take 1 tablet by mouth nightly as needed for Sleep, Disp: 30 tablet, Rfl: 1    albuterol sulfate HFA (PROAIR HFA) 108 (90 Base) MCG/ACT inhaler, Inhale 2 puffs into the lungs every 4 hours as needed for Wheezing, Disp: 1 Inhaler, Rfl: 0    Allergies   Allergen Reactions    Percocet [Oxycodone-Acetaminophen] Other (See Comments)     Migraines with percocet       Subjective:      Review of Systems   Respiratory: Negative for cough and shortness of breath. Cardiovascular: Negative for chest pain and palpitations. Gastrointestinal: Negative for bowel incontinence. Genitourinary: Negative for bladder incontinence and pelvic pain. Musculoskeletal: Positive for back pain. Skin: Negative for color change and pallor. Neurological: Negative for weakness. Psychiatric/Behavioral: Positive for decreased concentration and sleep disturbance. Negative for self-injury and suicidal ideas. Objective:     /70 (Site: Left Arm, Position: Sitting, Cuff Size: Large Adult)   Pulse 64   Ht 5' 4\" (1.626 m)   Wt 223 lb (101.2 kg)   LMP 02/09/2018   BMI 38.28 kg/m²     Physical Exam   Constitutional: She is oriented to person, place, and time. She appears well-developed and well-nourished. No distress. HENT:   Head: Normocephalic and atraumatic. Nose: Nose normal.   Eyes: Conjunctivae and EOM are normal.   Neck: Normal range of motion. Neck supple. Cardiovascular: Normal rate, regular rhythm and intact distal pulses. Pulmonary/Chest: Effort normal and breath sounds normal. No respiratory distress. She has no wheezes. Abdominal: Soft. Bowel sounds are normal. There is no tenderness. Musculoskeletal:        Lumbar back: She exhibits tenderness and bony tenderness. She exhibits no edema, no deformity and no laceration. Neurological: She is alert and oriented to person, place, and time. Skin: Skin is warm and dry. Psychiatric: She has a normal mood and affect. Her behavior is normal.   Vitals reviewed. Assessment/Plan:     Hussain Greenfield was seen today for adhd and depression.     Diagnoses and all orders for this visit:    Attention deficit hyperactivity disorder (ADHD), combined type    Bipolar disorder, current episode mixed, moderate (HCC)    Chronic midline low

## 2018-03-26 ENCOUNTER — HOSPITAL ENCOUNTER (OUTPATIENT)
Age: 33
Discharge: HOME | End: 2018-03-26
Payer: MEDICARE

## 2018-03-26 DIAGNOSIS — R53.83 FATIGUE, UNSPECIFIED TYPE: ICD-10-CM

## 2018-03-26 LAB
ANION GAP SERPL CALCULATED.3IONS-SCNC: 13 MEQ/L (ref 8–16)
BUN BLDV-MCNC: 10 MG/DL (ref 7–22)
CALCIUM SERPL-MCNC: 9.4 MG/DL (ref 8.5–10.5)
CHLORIDE BLD-SCNC: 102 MEQ/L (ref 98–111)
CO2: 27 MEQ/L (ref 23–33)
CREAT SERPL-MCNC: 0.7 MG/DL (ref 0.4–1.2)
GFR SERPL CREATININE-BSD FRML MDRD: > 90 ML/MIN/1.73M2
GLUCOSE BLD-MCNC: 107 MG/DL (ref 70–108)
POTASSIUM SERPL-SCNC: 4.2 MEQ/L (ref 3.5–5.2)
SODIUM BLD-SCNC: 142 MEQ/L (ref 135–145)
TSH SERPL DL<=0.05 MIU/L-ACNC: 0.94 UIU/ML (ref 0.4–4.2)
VITAMIN D 25-HYDROXY: 16 NG/ML (ref 30–100)

## 2018-03-26 PROCEDURE — 84443 ASSAY THYROID STIM HORMONE: CPT

## 2018-03-26 PROCEDURE — 80048 BASIC METABOLIC PNL TOTAL CA: CPT

## 2018-03-26 PROCEDURE — 82306 VITAMIN D 25 HYDROXY: CPT

## 2018-03-26 PROCEDURE — 36415 COLL VENOUS BLD VENIPUNCTURE: CPT

## 2018-03-27 ENCOUNTER — TELEPHONE (OUTPATIENT)
Dept: FAMILY MEDICINE CLINIC | Age: 33
End: 2018-03-27

## 2018-03-27 DIAGNOSIS — E55.9 VITAMIN D DEFICIENCY: Primary | ICD-10-CM

## 2018-03-27 RX ORDER — ERGOCALCIFEROL 1.25 MG/1
50000 CAPSULE ORAL WEEKLY
Qty: 12 CAPSULE | Refills: 0 | Status: SHIPPED | OUTPATIENT
Start: 2018-03-27 | End: 2018-12-29 | Stop reason: ALTCHOICE

## 2018-03-27 NOTE — TELEPHONE ENCOUNTER
----- Message from Victorino Calderón MD sent at 3/27/2018  8:03 AM EDT -----  Please let the patient know that her vitamin D was low so I've sent in vitamin D supplementation to [employer].

## 2018-04-03 RX ORDER — GABAPENTIN 400 MG/1
CAPSULE ORAL
Qty: 90 CAPSULE | Refills: 1 | Status: SHIPPED | OUTPATIENT
Start: 2018-04-03 | End: 2018-05-29 | Stop reason: SDUPTHER

## 2018-04-06 RX ORDER — METHYLPHENIDATE HYDROCHLORIDE 20 MG/1
20 TABLET ORAL 2 TIMES DAILY
Qty: 60 TABLET | Refills: 0 | Status: SHIPPED | OUTPATIENT
Start: 2018-04-06 | End: 2018-05-03 | Stop reason: SDUPTHER

## 2018-05-03 RX ORDER — TRAZODONE HYDROCHLORIDE 150 MG/1
150 TABLET ORAL NIGHTLY PRN
Qty: 30 TABLET | Refills: 0 | Status: SHIPPED | OUTPATIENT
Start: 2018-05-03 | End: 2018-06-07 | Stop reason: SDUPTHER

## 2018-05-03 RX ORDER — METHYLPHENIDATE HYDROCHLORIDE 20 MG/1
20 TABLET ORAL 2 TIMES DAILY
Qty: 60 TABLET | Refills: 0 | Status: SHIPPED | OUTPATIENT
Start: 2018-05-03 | End: 2018-05-11 | Stop reason: SINTOL

## 2018-05-11 ENCOUNTER — OFFICE VISIT (OUTPATIENT)
Dept: PSYCHIATRY | Age: 33
End: 2018-05-11
Payer: MEDICARE

## 2018-05-11 DIAGNOSIS — F31.78 BIPOLAR DISORDER, IN FULL REMISSION, MOST RECENT EPISODE MIXED (HCC): ICD-10-CM

## 2018-05-11 DIAGNOSIS — F90.2 ATTENTION DEFICIT HYPERACTIVITY DISORDER (ADHD), COMBINED TYPE: Primary | ICD-10-CM

## 2018-05-11 PROCEDURE — G8417 CALC BMI ABV UP PARAM F/U: HCPCS | Performed by: PHYSICIAN ASSISTANT

## 2018-05-11 PROCEDURE — G8427 DOCREV CUR MEDS BY ELIG CLIN: HCPCS | Performed by: PHYSICIAN ASSISTANT

## 2018-05-11 PROCEDURE — 4004F PT TOBACCO SCREEN RCVD TLK: CPT | Performed by: PHYSICIAN ASSISTANT

## 2018-05-11 PROCEDURE — 99213 OFFICE O/P EST LOW 20 MIN: CPT | Performed by: PHYSICIAN ASSISTANT

## 2018-05-11 RX ORDER — METHYLPHENIDATE HYDROCHLORIDE 40 MG/1
40 CAPSULE, EXTENDED RELEASE ORAL EVERY MORNING
Qty: 30 CAPSULE | Refills: 0 | Status: SHIPPED | OUTPATIENT
Start: 2018-05-11 | End: 2018-06-07 | Stop reason: SDUPTHER

## 2018-05-29 RX ORDER — GABAPENTIN 400 MG/1
CAPSULE ORAL
Qty: 90 CAPSULE | Refills: 0 | Status: SHIPPED | OUTPATIENT
Start: 2018-05-29 | End: 2018-08-06 | Stop reason: SDUPTHER

## 2018-06-07 DIAGNOSIS — F98.8 ATTENTION DEFICIT DISORDER (ADD) WITHOUT HYPERACTIVITY: Primary | ICD-10-CM

## 2018-06-07 RX ORDER — METHYLPHENIDATE HYDROCHLORIDE 40 MG/1
40 CAPSULE, EXTENDED RELEASE ORAL EVERY MORNING
Qty: 30 CAPSULE | Refills: 0 | Status: SHIPPED | OUTPATIENT
Start: 2018-06-07 | End: 2018-07-05 | Stop reason: SDUPTHER

## 2018-06-08 RX ORDER — TRAZODONE HYDROCHLORIDE 150 MG/1
150 TABLET ORAL NIGHTLY PRN
Qty: 30 TABLET | Refills: 0 | Status: SHIPPED | OUTPATIENT
Start: 2018-06-08 | End: 2018-11-26 | Stop reason: SDUPTHER

## 2018-07-05 DIAGNOSIS — F98.8 ATTENTION DEFICIT DISORDER (ADD) WITHOUT HYPERACTIVITY: ICD-10-CM

## 2018-07-06 RX ORDER — METHYLPHENIDATE HYDROCHLORIDE 40 MG/1
40 CAPSULE, EXTENDED RELEASE ORAL EVERY MORNING
Qty: 30 CAPSULE | Refills: 0 | Status: SHIPPED | OUTPATIENT
Start: 2018-07-06 | End: 2018-08-06 | Stop reason: SDUPTHER

## 2018-08-06 ENCOUNTER — OFFICE VISIT (OUTPATIENT)
Dept: PSYCHIATRY | Age: 33
End: 2018-08-06
Payer: MEDICARE

## 2018-08-06 DIAGNOSIS — F41.1 GAD (GENERALIZED ANXIETY DISORDER): ICD-10-CM

## 2018-08-06 DIAGNOSIS — F31.78 BIPOLAR DISORDER, IN FULL REMISSION, MOST RECENT EPISODE MIXED (HCC): ICD-10-CM

## 2018-08-06 DIAGNOSIS — F98.8 ATTENTION DEFICIT DISORDER (ADD) WITHOUT HYPERACTIVITY: Primary | ICD-10-CM

## 2018-08-06 PROCEDURE — 99213 OFFICE O/P EST LOW 20 MIN: CPT | Performed by: PHYSICIAN ASSISTANT

## 2018-08-06 PROCEDURE — G8417 CALC BMI ABV UP PARAM F/U: HCPCS | Performed by: PHYSICIAN ASSISTANT

## 2018-08-06 PROCEDURE — 4004F PT TOBACCO SCREEN RCVD TLK: CPT | Performed by: PHYSICIAN ASSISTANT

## 2018-08-06 PROCEDURE — G8427 DOCREV CUR MEDS BY ELIG CLIN: HCPCS | Performed by: PHYSICIAN ASSISTANT

## 2018-08-06 RX ORDER — GABAPENTIN 300 MG/1
CAPSULE ORAL
Qty: 90 CAPSULE | Refills: 3 | Status: SHIPPED | OUTPATIENT
Start: 2018-08-06 | End: 2018-08-09 | Stop reason: DRUGHIGH

## 2018-08-06 RX ORDER — ZIPRASIDONE HYDROCHLORIDE 20 MG/1
20 CAPSULE ORAL 2 TIMES DAILY WITH MEALS
Qty: 6 CAPSULE | Refills: 0 | Status: SHIPPED | OUTPATIENT
Start: 2018-08-06 | End: 2018-10-26 | Stop reason: ALTCHOICE

## 2018-08-06 RX ORDER — METHYLPHENIDATE HYDROCHLORIDE 50 MG/1
50 CAPSULE, EXTENDED RELEASE ORAL EVERY MORNING
Qty: 30 CAPSULE | Refills: 0 | Status: SHIPPED | OUTPATIENT
Start: 2018-08-06 | End: 2018-09-04 | Stop reason: SDUPTHER

## 2018-08-06 RX ORDER — ZIPRASIDONE HYDROCHLORIDE 20 MG/1
20 CAPSULE ORAL 2 TIMES DAILY WITH MEALS
Qty: 60 CAPSULE | Refills: 3 | Status: SHIPPED | OUTPATIENT
Start: 2018-08-06 | End: 2018-08-06 | Stop reason: DRUGHIGH

## 2018-08-06 RX ORDER — ZIPRASIDONE HYDROCHLORIDE 40 MG/1
40 CAPSULE ORAL 2 TIMES DAILY WITH MEALS
Qty: 60 CAPSULE | Refills: 3 | Status: SHIPPED | OUTPATIENT
Start: 2018-08-06 | End: 2018-10-26 | Stop reason: SDUPTHER

## 2018-08-09 RX ORDER — GABAPENTIN 600 MG/1
600 TABLET ORAL 3 TIMES DAILY
Qty: 90 TABLET | Refills: 3 | Status: SHIPPED | OUTPATIENT
Start: 2018-08-09 | End: 2018-10-26 | Stop reason: SDUPTHER

## 2018-09-04 DIAGNOSIS — F98.8 ATTENTION DEFICIT DISORDER (ADD) WITHOUT HYPERACTIVITY: ICD-10-CM

## 2018-09-04 RX ORDER — METHYLPHENIDATE HYDROCHLORIDE 50 MG/1
50 CAPSULE, EXTENDED RELEASE ORAL EVERY MORNING
Qty: 30 CAPSULE | Refills: 0 | Status: SHIPPED | OUTPATIENT
Start: 2018-09-04 | End: 2018-10-02 | Stop reason: SDUPTHER

## 2018-09-04 NOTE — TELEPHONE ENCOUNTER
Emil Baumgarten called requesting a refill of Methylphenidate 50 mg      Last Appointment Date: 8/6/2018  Next Appointment Date: 11/5/2018

## 2018-10-02 DIAGNOSIS — F98.8 ATTENTION DEFICIT DISORDER (ADD) WITHOUT HYPERACTIVITY: ICD-10-CM

## 2018-10-02 RX ORDER — METHYLPHENIDATE HYDROCHLORIDE 50 MG/1
50 CAPSULE, EXTENDED RELEASE ORAL EVERY MORNING
Qty: 30 CAPSULE | Refills: 0 | Status: SHIPPED | OUTPATIENT
Start: 2018-10-02 | End: 2018-10-26 | Stop reason: SDUPTHER

## 2018-10-02 NOTE — TELEPHONE ENCOUNTER
Gisella Maher called the office to request a refill of Methylphenidate CD 50mg daily. She attended an appointment in the office 8/6 and is scheduled to return 11/5.

## 2018-10-26 DIAGNOSIS — F98.8 ATTENTION DEFICIT DISORDER (ADD) WITHOUT HYPERACTIVITY: ICD-10-CM

## 2018-10-26 PROBLEM — F31.78 BIPOLAR DISORDER, IN FULL REMISSION, MOST RECENT EPISODE MIXED (HCC): Status: ACTIVE | Noted: 2018-10-26

## 2018-10-26 RX ORDER — METHYLPHENIDATE HYDROCHLORIDE 50 MG/1
50 CAPSULE, EXTENDED RELEASE ORAL EVERY MORNING
Qty: 30 CAPSULE | Refills: 0 | Status: SHIPPED | OUTPATIENT
Start: 2018-10-26 | End: 2018-11-26 | Stop reason: SDUPTHER

## 2018-10-26 RX ORDER — ZIPRASIDONE HYDROCHLORIDE 40 MG/1
40 CAPSULE ORAL 2 TIMES DAILY WITH MEALS
Qty: 60 CAPSULE | Refills: 3 | Status: SHIPPED | OUTPATIENT
Start: 2018-10-26 | End: 2019-03-13

## 2018-10-26 RX ORDER — GABAPENTIN 600 MG/1
600 TABLET ORAL 3 TIMES DAILY
Qty: 90 TABLET | Refills: 3 | Status: SHIPPED | OUTPATIENT
Start: 2018-10-26 | End: 2019-01-11 | Stop reason: SDUPTHER

## 2018-11-26 DIAGNOSIS — F98.8 ATTENTION DEFICIT DISORDER (ADD) WITHOUT HYPERACTIVITY: ICD-10-CM

## 2018-11-26 RX ORDER — METHYLPHENIDATE HYDROCHLORIDE 50 MG/1
50 CAPSULE, EXTENDED RELEASE ORAL EVERY MORNING
Qty: 30 CAPSULE | Refills: 0 | Status: SHIPPED | OUTPATIENT
Start: 2018-11-26 | End: 2018-12-21 | Stop reason: SDUPTHER

## 2018-11-26 RX ORDER — TRAZODONE HYDROCHLORIDE 150 MG/1
150 TABLET ORAL NIGHTLY PRN
Qty: 30 TABLET | Refills: 5 | Status: SHIPPED | OUTPATIENT
Start: 2018-11-26 | End: 2019-12-02 | Stop reason: SDUPTHER

## 2018-11-26 NOTE — TELEPHONE ENCOUNTER
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 I have transitioned her care to [address] [place] [address]; in which she now has a future scheduled appt on 12/07/18. While on the phone, she states that in the meantime until her appointment; she needs a refill on her Metadate CD 50mg ER; #30 with 0 refills;last filled on 10/26/18 to end on 11/25/18 and Trazodone 150mg;#30 with 0 refills;last filled on 06/08/18. She states that she has a few tablets left of her Trazodone but will need it before her next appt.      I have loaded both medications for only 30 day supplies pending your approval.

## 2018-12-21 DIAGNOSIS — F98.8 ATTENTION DEFICIT DISORDER (ADD) WITHOUT HYPERACTIVITY: ICD-10-CM

## 2018-12-21 RX ORDER — METHYLPHENIDATE HYDROCHLORIDE 50 MG/1
50 CAPSULE, EXTENDED RELEASE ORAL EVERY MORNING
Qty: 30 CAPSULE | Refills: 0 | Status: SHIPPED | OUTPATIENT
Start: 2018-12-21 | End: 2019-01-18 | Stop reason: SDUPTHER

## 2018-12-29 ENCOUNTER — HOSPITAL ENCOUNTER (EMERGENCY)
Age: 33
Discharge: HOME | End: 2018-12-29
Attending: NURSE PRACTITIONER
Payer: MEDICARE

## 2018-12-29 VITALS
SYSTOLIC BLOOD PRESSURE: 153 MMHG | HEART RATE: 87 BPM | DIASTOLIC BLOOD PRESSURE: 69 MMHG | OXYGEN SATURATION: 98 % | TEMPERATURE: 98.2 F | RESPIRATION RATE: 18 BRPM

## 2018-12-29 DIAGNOSIS — K08.89 DENTALGIA: Primary | ICD-10-CM

## 2018-12-29 DIAGNOSIS — H92.02 REFERRED OTALGIA OF LEFT EAR: ICD-10-CM

## 2018-12-29 PROCEDURE — 99212 OFFICE O/P EST SF 10 MIN: CPT

## 2018-12-29 PROCEDURE — 99213 OFFICE O/P EST LOW 20 MIN: CPT | Performed by: NURSE PRACTITIONER

## 2018-12-29 RX ORDER — PENICILLIN V POTASSIUM 500 MG/1
500 TABLET ORAL 4 TIMES DAILY
Qty: 40 TABLET | Refills: 0 | Status: SHIPPED | OUTPATIENT
Start: 2018-12-29 | End: 2019-01-08

## 2018-12-29 RX ORDER — IBUPROFEN 200 MG
800 TABLET ORAL EVERY 8 HOURS PRN
COMMUNITY
End: 2022-06-07

## 2018-12-29 ASSESSMENT — PAIN DESCRIPTION - DESCRIPTORS: DESCRIPTORS: ACHING;PRESSURE

## 2018-12-29 ASSESSMENT — ENCOUNTER SYMPTOMS
FACIAL SWELLING: 0
SORE THROAT: 0

## 2018-12-29 ASSESSMENT — PAIN DESCRIPTION - PAIN TYPE: TYPE: ACUTE PAIN

## 2018-12-29 ASSESSMENT — PAIN SCALES - GENERAL: PAINLEVEL_OUTOF10: 6

## 2019-01-11 ENCOUNTER — OFFICE VISIT (OUTPATIENT)
Dept: PSYCHIATRY | Age: 34
End: 2019-01-11
Payer: MEDICARE

## 2019-01-11 VITALS — BODY MASS INDEX: 36.73 KG/M2 | WEIGHT: 214 LBS

## 2019-01-11 DIAGNOSIS — F31.63 BIPOLAR DISORDER, CURRENT EPISODE MIXED, SEVERE, WITHOUT PSYCHOTIC FEATURES (HCC): ICD-10-CM

## 2019-01-11 DIAGNOSIS — F98.8 ATTENTION DEFICIT DISORDER (ADD) WITHOUT HYPERACTIVITY: Primary | ICD-10-CM

## 2019-01-11 PROCEDURE — 90792 PSYCH DIAG EVAL W/MED SRVCS: CPT | Performed by: NURSE PRACTITIONER

## 2019-01-11 PROCEDURE — 4004F PT TOBACCO SCREEN RCVD TLK: CPT | Performed by: NURSE PRACTITIONER

## 2019-01-11 RX ORDER — GABAPENTIN 800 MG/1
800 TABLET ORAL 3 TIMES DAILY
Qty: 90 TABLET | Refills: 0 | Status: SHIPPED | OUTPATIENT
Start: 2019-01-11 | End: 2019-02-15 | Stop reason: SDUPTHER

## 2019-01-11 RX ORDER — GABAPENTIN 800 MG/1
800 TABLET ORAL 3 TIMES DAILY
Qty: 90 TABLET | Refills: 1 | Status: SHIPPED | OUTPATIENT
Start: 2019-01-11 | End: 2019-01-11 | Stop reason: SDUPTHER

## 2019-01-11 ASSESSMENT — ANXIETY QUESTIONNAIRES
5. BEING SO RESTLESS THAT IT IS HARD TO SIT STILL: 1-SEVERAL DAYS
4. TROUBLE RELAXING: 1-SEVERAL DAYS
1. FEELING NERVOUS, ANXIOUS, OR ON EDGE: 3-NEARLY EVERY DAY
3. WORRYING TOO MUCH ABOUT DIFFERENT THINGS: 2-OVER HALF THE DAYS
GAD7 TOTAL SCORE: 13
2. NOT BEING ABLE TO STOP OR CONTROL WORRYING: 0-NOT AT ALL SURE
6. BECOMING EASILY ANNOYED OR IRRITABLE: 3-NEARLY EVERY DAY
7. FEELING AFRAID AS IF SOMETHING AWFUL MIGHT HAPPEN: 3-NEARLY EVERY DAY

## 2019-01-11 ASSESSMENT — PATIENT HEALTH QUESTIONNAIRE - PHQ9
6. FEELING BAD ABOUT YOURSELF - OR THAT YOU ARE A FAILURE OR HAVE LET YOURSELF OR YOUR FAMILY DOWN: 1
5. POOR APPETITE OR OVEREATING: 2
10. IF YOU CHECKED OFF ANY PROBLEMS, HOW DIFFICULT HAVE THESE PROBLEMS MADE IT FOR YOU TO DO YOUR WORK, TAKE CARE OF THINGS AT HOME, OR GET ALONG WITH OTHER PEOPLE: 2
3. TROUBLE FALLING OR STAYING ASLEEP: 2
8. MOVING OR SPEAKING SO SLOWLY THAT OTHER PEOPLE COULD HAVE NOTICED. OR THE OPPOSITE, BEING SO FIGETY OR RESTLESS THAT YOU HAVE BEEN MOVING AROUND A LOT MORE THAN USUAL: 0
7. TROUBLE CONCENTRATING ON THINGS, SUCH AS READING THE NEWSPAPER OR WATCHING TELEVISION: 2
9. THOUGHTS THAT YOU WOULD BE BETTER OFF DEAD, OR OF HURTING YOURSELF: 3
2. FEELING DOWN, DEPRESSED OR HOPELESS: 2
SUM OF ALL RESPONSES TO PHQ QUESTIONS 1-9: 15
SUM OF ALL RESPONSES TO PHQ QUESTIONS 1-9: 15
SUM OF ALL RESPONSES TO PHQ9 QUESTIONS 1 & 2: 3
1. LITTLE INTEREST OR PLEASURE IN DOING THINGS: 1
4. FEELING TIRED OR HAVING LITTLE ENERGY: 2

## 2019-01-18 DIAGNOSIS — F98.8 ATTENTION DEFICIT DISORDER (ADD) WITHOUT HYPERACTIVITY: ICD-10-CM

## 2019-01-18 RX ORDER — METHYLPHENIDATE HYDROCHLORIDE 50 MG/1
50 CAPSULE, EXTENDED RELEASE ORAL EVERY MORNING
Qty: 30 CAPSULE | Refills: 0 | Status: SHIPPED | OUTPATIENT
Start: 2019-01-20 | End: 2019-02-15 | Stop reason: SDUPTHER

## 2019-02-15 DIAGNOSIS — F31.63 SEVERE MIXED BIPOLAR I DISORDER WITHOUT PSYCHOTIC FEATURES (HCC): Primary | ICD-10-CM

## 2019-02-15 DIAGNOSIS — F98.8 ATTENTION DEFICIT DISORDER (ADD) WITHOUT HYPERACTIVITY: ICD-10-CM

## 2019-02-15 RX ORDER — GABAPENTIN 800 MG/1
800 TABLET ORAL 3 TIMES DAILY
Qty: 90 TABLET | Refills: 0 | Status: SHIPPED | OUTPATIENT
Start: 2019-02-15 | End: 2019-03-13

## 2019-02-15 RX ORDER — METHYLPHENIDATE HYDROCHLORIDE 50 MG/1
50 CAPSULE, EXTENDED RELEASE ORAL EVERY MORNING
Qty: 30 CAPSULE | Refills: 0 | Status: SHIPPED | OUTPATIENT
Start: 2019-02-15 | End: 2019-03-13

## 2019-03-13 DIAGNOSIS — F98.8 ATTENTION DEFICIT DISORDER (ADD) WITHOUT HYPERACTIVITY: ICD-10-CM

## 2019-03-13 DIAGNOSIS — F31.63 SEVERE MIXED BIPOLAR I DISORDER WITHOUT PSYCHOTIC FEATURES (HCC): ICD-10-CM

## 2019-03-13 RX ORDER — GABAPENTIN 800 MG/1
800 TABLET ORAL 3 TIMES DAILY
Qty: 90 TABLET | Refills: 0 | Status: SHIPPED | OUTPATIENT
Start: 2019-03-13 | End: 2019-05-06 | Stop reason: SDUPTHER

## 2019-03-13 RX ORDER — ZIPRASIDONE HYDROCHLORIDE 40 MG/1
40 CAPSULE ORAL 2 TIMES DAILY WITH MEALS
Qty: 60 CAPSULE | Refills: 3 | Status: SHIPPED | OUTPATIENT
Start: 2019-03-13 | End: 2019-04-12 | Stop reason: SDUPTHER

## 2019-03-13 RX ORDER — METHYLPHENIDATE HYDROCHLORIDE 50 MG/1
50 CAPSULE, EXTENDED RELEASE ORAL EVERY MORNING
Qty: 30 CAPSULE | Refills: 0 | Status: SHIPPED | OUTPATIENT
Start: 2019-03-13 | End: 2019-04-12 | Stop reason: SDUPTHER

## 2019-04-12 ENCOUNTER — OFFICE VISIT (OUTPATIENT)
Dept: PSYCHIATRY | Age: 34
End: 2019-04-12
Payer: COMMERCIAL

## 2019-04-12 VITALS — BODY MASS INDEX: 37.76 KG/M2 | WEIGHT: 220 LBS

## 2019-04-12 DIAGNOSIS — F41.1 GAD (GENERALIZED ANXIETY DISORDER): ICD-10-CM

## 2019-04-12 DIAGNOSIS — F90.2 ATTENTION DEFICIT HYPERACTIVITY DISORDER (ADHD), COMBINED TYPE: ICD-10-CM

## 2019-04-12 DIAGNOSIS — F31.63 BIPOLAR DISORDER, CURRENT EPISODE MIXED, SEVERE, WITHOUT PSYCHOTIC FEATURES (HCC): Primary | ICD-10-CM

## 2019-04-12 PROCEDURE — 99214 OFFICE O/P EST MOD 30 MIN: CPT | Performed by: NURSE PRACTITIONER

## 2019-04-12 RX ORDER — HYDROXYZINE PAMOATE 25 MG/1
25 CAPSULE ORAL 2 TIMES DAILY PRN
Qty: 60 CAPSULE | Refills: 0 | Status: SHIPPED | OUTPATIENT
Start: 2019-04-12 | End: 2019-05-06 | Stop reason: SDUPTHER

## 2019-04-12 RX ORDER — ZIPRASIDONE HYDROCHLORIDE 60 MG/1
60 CAPSULE ORAL 2 TIMES DAILY WITH MEALS
Qty: 60 CAPSULE | Refills: 0 | Status: SHIPPED | OUTPATIENT
Start: 2019-04-12 | End: 2019-05-06 | Stop reason: SDUPTHER

## 2019-04-12 RX ORDER — METHYLPHENIDATE HYDROCHLORIDE 10 MG/1
10 CAPSULE, EXTENDED RELEASE ORAL EVERY MORNING
Qty: 30 CAPSULE | Refills: 0 | Status: SHIPPED | OUTPATIENT
Start: 2019-04-12 | End: 2019-05-06 | Stop reason: CLARIF

## 2019-04-12 RX ORDER — METHYLPHENIDATE HYDROCHLORIDE 20 MG/1
40 CAPSULE, EXTENDED RELEASE ORAL EVERY MORNING
Qty: 60 CAPSULE | Refills: 0 | Status: SHIPPED | OUTPATIENT
Start: 2019-04-12 | End: 2019-05-06 | Stop reason: CLARIF

## 2019-04-12 NOTE — PROGRESS NOTES
[address] [facility] [address] [facility]. [facility] Ashu Fagan  Dept: [phone]  Dept Fax: [phone]: [phone]      Visit Date: 4/12/2019      Pertinent History & Psychiatric Examination      Juan Jose Olvera is a 29 y.o.  female returns today after 3 months since her last visit for follow up and medication management. Chief Complaint   Patient presents with    Depression     Bipolar 2, MRE Mixed    ADHD      She reports with improvement in her mood until the past 1 month when she states that she had \"terrible anger that I could have hurt someone\". Says littlest of things would set her off. She was frustrated and \"pissed at the world for no reason\". States that her kids avoided her along with her fiance. States that she has always had issues with anger but has never had it so bad like she did in the past month. Says she is no longer that bad at this time but was greatly worried about her heightened anger. Reports also increased anxiety with a feeling that her \"heart will break, chest hurting and shakes\". Says she would sweat when it got really bad. Denies any triggers and says that \"it comes out of no where\" and could happen 2-3 times weekly, more so when it is very bad. Her sleep is good except a week before her period when she sleeps less-about 2-3 hours and yet feels like she slept 8 hours. Says she has slept better after her period ends  She remains in school, says her medication is helping her focus. She remains with flights of ideas, racing thoughts and is quite talkative. She denies any thoughts of self harm, no hallucinations, denies use of drugs or alcohol. Requesting a change in the way her Metadate is ordered to facilitate her new insurance coverage. Order changed from 1 50 mg pill to 2 pills of 20 mg and 1 pill of 10 mg (total of 50 mg).  Patient says she will like to be transitioned to a provider who will be willing to prescribe her Metadate. She will discuss with office staff and make her choice. Past Surgical History:   Procedure Laterality Date    CHOLECYSTECTOMY  2007    OTHER SURGICAL HISTORY  2007    lumbar shunt/ shunt    OTHER SURGICAL HISTORY      chiari decompression    VENTRICULOPERITONEAL SHUNT       Family History   Problem Relation Age of Onset    Hypertension Mother     Alzheimer's Disease Father     Stroke Father     Heart Disease Father      Social History     Tobacco Use    Smoking status: Current Every Day Smoker     Packs/day: 0.25     Types: Cigarettes    Smokeless tobacco: Never Used   Substance Use Topics    Alcohol use: No     Current Outpatient Medications   Medication Sig Dispense Refill    methylphenidate (METADATE CD) 20 MG extended release capsule Take 2 capsules by mouth every morning for 30 days. 60 capsule 0    methylphenidate (METADATE CD) 10 MG extended release capsule Take 1 capsule by mouth every morning for 30 days. 30 capsule 0    ziprasidone (GEODON) 60 MG capsule Take 1 capsule by mouth 2 times daily (with meals) 60 capsule 0    hydrOXYzine (VISTARIL) 25 MG capsule Take 1 capsule by mouth 2 times daily as needed for Anxiety 60 capsule 0    gabapentin (NEURONTIN) 800 MG tablet Take 1 tablet by mouth 3 times daily for 30 days. 90 tablet 0    ibuprofen (ADVIL;MOTRIN) 200 MG tablet Take 800 mg by mouth every 8 hours as needed for Pain      traZODone (DESYREL) 150 MG tablet Take 1 tablet by mouth nightly as needed for Sleep 30 tablet 5     No current facility-administered medications for this visit. Allergies   Allergen Reactions    Percocet [Oxycodone-Acetaminophen] Other (See Comments)     Migraines with percocet       Mental Status Evaluation:   Orientation: Alert, oriented, thought content appropriate   Mood:.  euphoric      Affect:  increased in intensity and increased in range      Appearance:  age appropriate, casually dressed, overweight and tattooed Activity:  Restless & fidgety   Gait/Posture: Normal   Speech:  increased latency of response, loud and pressured   Thought Process:  circumstantial and flight of ideas   Thought Content:  denies   Sensorium:  person, place, time/date, situation, day of week, month of year and year   Cognition:  grossly intact   Memory: intact   Insight:  fair   Judgment: fair   Suicidal Ideations: denies suicidal ideation   Homicidal Ideations: Negative for homicidal ideation      Medication Side Effects: absent       Attention Span attention span and concentration were age appropriate     Clinical Assessment Medical Decision    Attention deficit disorder (ADD) with  hyperactivity     2. Bipolar disorder, current episode mixed, moderate, without psychotic features ([facility] [address]    3. ABRAHAN  4. Rule out Panic Disorder    Past Medical History:   Diagnosis Date    ADHD (attention deficit hyperactivity disorder)     Bipolar disorder (HCC)     Chiari malformation     Depression     Fibromyalgia     Pseudotumor cerebri        Precautions with Justification:   None    Medication Review/Mgmt: continue Neurontin at it's current dose as well as Metadate, begin Vistaril 25 mg BID PRN for anxiety and increasing Geodon to 60 mg BID to help with mixed mood/anger     Medical Issues: See above    Assessment of Risk for Harm to Self/Others:  None indicated    PLAN    Increased Geodon to 60 mg BID  Continue on Methadate and Trazodone at their current doses  Continue on Neurontin at 800 mg TID to help with her anxiety. Added Vistaril 25 mg BID PRN for anxiety         Risks/SE's/benefits/alternate treatments discussed, patient stated understanding and is agreeable to treatment plan. Patient's Response to Treatment: some positive    I spent a total of 26 minutes with the patient and over half of that time was spent on counseling and coordination of care regarding topics discussed above.     Electronically signed by LIBAN Lopez CNP on

## 2019-05-06 DIAGNOSIS — F31.63 SEVERE MIXED BIPOLAR I DISORDER WITHOUT PSYCHOTIC FEATURES (HCC): ICD-10-CM

## 2019-05-06 RX ORDER — HYDROXYZINE PAMOATE 25 MG/1
25 CAPSULE ORAL 2 TIMES DAILY PRN
Qty: 60 CAPSULE | Refills: 1 | Status: SHIPPED | OUTPATIENT
Start: 2019-05-06 | End: 2019-10-02 | Stop reason: SDUPTHER

## 2019-05-06 RX ORDER — ZIPRASIDONE HYDROCHLORIDE 60 MG/1
60 CAPSULE ORAL 2 TIMES DAILY WITH MEALS
Qty: 60 CAPSULE | Refills: 1 | Status: SHIPPED | OUTPATIENT
Start: 2019-05-06 | End: 2019-07-10 | Stop reason: SDUPTHER

## 2019-05-06 RX ORDER — METHYLPHENIDATE HYDROCHLORIDE EXTENDED RELEASE 20 MG/1
40 TABLET ORAL EVERY MORNING
Qty: 60 TABLET | Refills: 0 | Status: SHIPPED | OUTPATIENT
Start: 2019-05-12 | End: 2019-06-10 | Stop reason: SDUPTHER

## 2019-05-06 RX ORDER — GABAPENTIN 800 MG/1
800 TABLET ORAL 3 TIMES DAILY
Qty: 90 TABLET | Refills: 1 | Status: SHIPPED | OUTPATIENT
Start: 2019-05-06 | End: 2019-07-10 | Stop reason: SDUPTHER

## 2019-05-06 RX ORDER — METHYLPHENIDATE HYDROCHLORIDE EXTENDED RELEASE 10 MG/1
10 TABLET ORAL EVERY MORNING
Qty: 30 TABLET | Refills: 0 | Status: SHIPPED | OUTPATIENT
Start: 2019-05-12 | End: 2019-06-10 | Stop reason: SDUPTHER

## 2019-05-06 NOTE — TELEPHONE ENCOUNTER
Demetris Leon called the office to report that she received notification from her insurance company that they will not cover Metadate ER capsules but they will cover Metadate ER tablets. She will be out on Sunday and acknowelges that she will not be able to obtain refills any earlier than when they are due. She is also asking if she can have refills of Vistaril, Geodon and Neurontin.     She attended an appointment in the office 4/12 and is scheduled to return with Dr. Lonnie Lucas in July

## 2019-06-10 DIAGNOSIS — F31.63 SEVERE MIXED BIPOLAR I DISORDER WITHOUT PSYCHOTIC FEATURES (HCC): ICD-10-CM

## 2019-06-10 RX ORDER — METHYLPHENIDATE HYDROCHLORIDE EXTENDED RELEASE 20 MG/1
40 TABLET ORAL EVERY MORNING
Qty: 60 TABLET | Refills: 0 | Status: SHIPPED | OUTPATIENT
Start: 2019-06-10 | End: 2019-07-10

## 2019-06-10 RX ORDER — METHYLPHENIDATE HYDROCHLORIDE EXTENDED RELEASE 10 MG/1
10 TABLET ORAL EVERY MORNING
Qty: 30 TABLET | Refills: 0 | Status: SHIPPED | OUTPATIENT
Start: 2019-06-10 | End: 2019-07-10

## 2019-06-10 NOTE — TELEPHONE ENCOUNTER
Davidav Arevalo is a previous patient of Alane Nyhan requesting refill for Metadate ER 10mg and 20mg.       Last Appointment Date: 4/12/19  Next Appointment Date: 7/10/2019

## 2019-07-10 ENCOUNTER — OFFICE VISIT (OUTPATIENT)
Dept: PSYCHIATRY | Age: 34
End: 2019-07-10
Payer: COMMERCIAL

## 2019-07-10 DIAGNOSIS — F31.9 BIPOLAR 1 DISORDER (HCC): ICD-10-CM

## 2019-07-10 DIAGNOSIS — F90.0 ATTENTION DEFICIT HYPERACTIVITY DISORDER (ADHD), PREDOMINANTLY INATTENTIVE TYPE: ICD-10-CM

## 2019-07-10 DIAGNOSIS — F41.9 ANXIETY: Primary | ICD-10-CM

## 2019-07-10 PROCEDURE — 90792 PSYCH DIAG EVAL W/MED SRVCS: CPT | Performed by: PSYCHIATRY & NEUROLOGY

## 2019-07-10 RX ORDER — METHYLPHENIDATE HYDROCHLORIDE 54 MG/1
54 TABLET ORAL DAILY
Qty: 30 TABLET | Refills: 0 | Status: SHIPPED | OUTPATIENT
Start: 2019-07-10 | End: 2019-08-08 | Stop reason: SDUPTHER

## 2019-07-10 RX ORDER — LAMOTRIGINE 100 MG/1
100 TABLET ORAL DAILY
Qty: 30 TABLET | Refills: 3 | Status: SHIPPED | OUTPATIENT
Start: 2019-07-10 | End: 2019-12-02 | Stop reason: SDUPTHER

## 2019-07-10 RX ORDER — LAMOTRIGINE 25 MG/1
50 TABLET ORAL DAILY
Qty: 15 TABLET | Refills: 1 | Status: SHIPPED | OUTPATIENT
Start: 2019-07-10 | End: 2019-12-02

## 2019-07-10 RX ORDER — GABAPENTIN 800 MG/1
800 TABLET ORAL 3 TIMES DAILY
Qty: 90 TABLET | Refills: 1 | Status: SHIPPED | OUTPATIENT
Start: 2019-07-10 | End: 2019-10-02 | Stop reason: SDUPTHER

## 2019-07-10 RX ORDER — ZIPRASIDONE HYDROCHLORIDE 60 MG/1
60 CAPSULE ORAL 2 TIMES DAILY WITH MEALS
Qty: 60 CAPSULE | Refills: 1 | Status: SHIPPED | OUTPATIENT
Start: 2019-07-10 | End: 2019-10-02 | Stop reason: SDUPTHER

## 2019-08-08 DIAGNOSIS — F31.9 BIPOLAR 1 DISORDER (HCC): ICD-10-CM

## 2019-08-08 RX ORDER — METHYLPHENIDATE HYDROCHLORIDE 54 MG/1
54 TABLET ORAL DAILY
Qty: 30 TABLET | Refills: 0 | Status: SHIPPED | OUTPATIENT
Start: 2019-08-08 | End: 2019-09-05 | Stop reason: SDUPTHER

## 2019-08-08 NOTE — TELEPHONE ENCOUNTER
Nav Morton called the office to obtain a refill of Concerta 97PJ/A. She attended an appointment in the office 7/10 and is scheduled to return 10/1.  (Her appt will be rescheduled)

## 2019-09-05 DIAGNOSIS — F31.9 BIPOLAR 1 DISORDER (HCC): ICD-10-CM

## 2019-09-05 RX ORDER — METHYLPHENIDATE HYDROCHLORIDE 54 MG/1
54 TABLET ORAL DAILY
Qty: 30 TABLET | Refills: 0 | Status: SHIPPED | OUTPATIENT
Start: 2019-09-05 | End: 2019-10-02 | Stop reason: SDUPTHER

## 2019-09-17 ENCOUNTER — OFFICE VISIT (OUTPATIENT)
Dept: INTERNAL MEDICINE CLINIC | Age: 34
End: 2019-09-17
Payer: COMMERCIAL

## 2019-09-17 VITALS
OXYGEN SATURATION: 98 % | BODY MASS INDEX: 37.99 KG/M2 | HEART RATE: 96 BPM | HEIGHT: 65 IN | TEMPERATURE: 98.8 F | DIASTOLIC BLOOD PRESSURE: 84 MMHG | WEIGHT: 228 LBS | SYSTOLIC BLOOD PRESSURE: 126 MMHG

## 2019-09-17 DIAGNOSIS — F17.219 CIGARETTE NICOTINE DEPENDENCE WITH NICOTINE-INDUCED DISORDER: ICD-10-CM

## 2019-09-17 DIAGNOSIS — M62.830 LUMBAR PARASPINAL MUSCLE SPASM: Primary | ICD-10-CM

## 2019-09-17 DIAGNOSIS — M48.062 LUMBAR STENOSIS WITH NEUROGENIC CLAUDICATION: ICD-10-CM

## 2019-09-17 DIAGNOSIS — G93.2 PSEUDOTUMOR CEREBRI: ICD-10-CM

## 2019-09-17 PROCEDURE — 99204 OFFICE O/P NEW MOD 45 MIN: CPT | Performed by: INTERNAL MEDICINE

## 2019-09-17 RX ORDER — MELOXICAM 7.5 MG/1
7.5 TABLET ORAL DAILY PRN
Qty: 30 TABLET | Refills: 1 | Status: SHIPPED | OUTPATIENT
Start: 2019-09-17 | End: 2022-01-20

## 2019-09-17 RX ORDER — TIZANIDINE 4 MG/1
4 TABLET ORAL 3 TIMES DAILY
Qty: 40 TABLET | Refills: 1 | Status: SHIPPED | OUTPATIENT
Start: 2019-09-17 | End: 2022-01-20

## 2019-09-17 ASSESSMENT — PATIENT HEALTH QUESTIONNAIRE - PHQ9
SUM OF ALL RESPONSES TO PHQ QUESTIONS 1-9: 0
1. LITTLE INTEREST OR PLEASURE IN DOING THINGS: 0
2. FEELING DOWN, DEPRESSED OR HOPELESS: 0
SUM OF ALL RESPONSES TO PHQ QUESTIONS 1-9: 0
SUM OF ALL RESPONSES TO PHQ9 QUESTIONS 1 & 2: 0

## 2019-10-02 DIAGNOSIS — F31.9 BIPOLAR 1 DISORDER (HCC): ICD-10-CM

## 2019-10-02 RX ORDER — METHYLPHENIDATE HYDROCHLORIDE 54 MG/1
54 TABLET ORAL DAILY
Qty: 30 TABLET | Refills: 0 | Status: SHIPPED | OUTPATIENT
Start: 2019-10-02 | End: 2019-11-01 | Stop reason: SDUPTHER

## 2019-10-02 RX ORDER — HYDROXYZINE PAMOATE 25 MG/1
25 CAPSULE ORAL 2 TIMES DAILY PRN
Qty: 60 CAPSULE | Refills: 0 | Status: SHIPPED | OUTPATIENT
Start: 2019-10-02 | End: 2019-11-01 | Stop reason: SDUPTHER

## 2019-10-02 RX ORDER — ZIPRASIDONE HYDROCHLORIDE 60 MG/1
60 CAPSULE ORAL 2 TIMES DAILY WITH MEALS
Qty: 60 CAPSULE | Refills: 0 | Status: SHIPPED | OUTPATIENT
Start: 2019-10-02 | End: 2019-11-01 | Stop reason: SDUPTHER

## 2019-10-02 RX ORDER — GABAPENTIN 800 MG/1
800 TABLET ORAL 3 TIMES DAILY
Qty: 90 TABLET | Refills: 0 | Status: SHIPPED | OUTPATIENT
Start: 2019-10-02 | End: 2019-11-01 | Stop reason: SDUPTHER

## 2019-10-13 ASSESSMENT — ENCOUNTER SYMPTOMS
EYES NEGATIVE: 1
RESPIRATORY NEGATIVE: 1
GASTROINTESTINAL NEGATIVE: 1

## 2019-10-31 DIAGNOSIS — F31.9 BIPOLAR 1 DISORDER (HCC): ICD-10-CM

## 2019-11-01 RX ORDER — HYDROXYZINE PAMOATE 25 MG/1
25 CAPSULE ORAL 2 TIMES DAILY PRN
Qty: 60 CAPSULE | Refills: 0 | Status: SHIPPED | OUTPATIENT
Start: 2019-11-01 | End: 2019-12-02 | Stop reason: SDUPTHER

## 2019-11-01 RX ORDER — GABAPENTIN 800 MG/1
800 TABLET ORAL 3 TIMES DAILY
Qty: 90 TABLET | Refills: 0 | Status: SHIPPED | OUTPATIENT
Start: 2019-11-01 | End: 2019-12-02 | Stop reason: SDUPTHER

## 2019-11-01 RX ORDER — METHYLPHENIDATE HYDROCHLORIDE 54 MG/1
54 TABLET ORAL DAILY
Qty: 30 TABLET | Refills: 0 | Status: SHIPPED | OUTPATIENT
Start: 2019-11-01 | End: 2019-12-02 | Stop reason: SDUPTHER

## 2019-11-01 RX ORDER — ZIPRASIDONE HYDROCHLORIDE 60 MG/1
60 CAPSULE ORAL 2 TIMES DAILY WITH MEALS
Qty: 60 CAPSULE | Refills: 0 | Status: SHIPPED | OUTPATIENT
Start: 2019-11-01 | End: 2019-12-02 | Stop reason: SDUPTHER

## 2019-12-02 DIAGNOSIS — F31.9 BIPOLAR 1 DISORDER (HCC): ICD-10-CM

## 2019-12-02 RX ORDER — HYDROXYZINE PAMOATE 25 MG/1
25 CAPSULE ORAL 2 TIMES DAILY PRN
Qty: 60 CAPSULE | Refills: 0 | Status: SHIPPED | OUTPATIENT
Start: 2019-12-02 | End: 2019-12-11 | Stop reason: SDUPTHER

## 2019-12-02 RX ORDER — TRAZODONE HYDROCHLORIDE 150 MG/1
150 TABLET ORAL NIGHTLY PRN
Qty: 30 TABLET | Refills: 0 | Status: SHIPPED | OUTPATIENT
Start: 2019-12-02 | End: 2020-01-06 | Stop reason: SDUPTHER

## 2019-12-02 RX ORDER — METHYLPHENIDATE HYDROCHLORIDE 54 MG/1
54 TABLET ORAL DAILY
Qty: 30 TABLET | Refills: 0 | Status: SHIPPED | OUTPATIENT
Start: 2019-12-02 | End: 2019-12-11 | Stop reason: SDUPTHER

## 2019-12-02 RX ORDER — LAMOTRIGINE 100 MG/1
100 TABLET ORAL DAILY
Qty: 30 TABLET | Refills: 0 | Status: SHIPPED | OUTPATIENT
Start: 2019-12-02 | End: 2019-12-11 | Stop reason: SDUPTHER

## 2019-12-02 RX ORDER — GABAPENTIN 800 MG/1
800 TABLET ORAL 3 TIMES DAILY
Qty: 90 TABLET | Refills: 0 | Status: SHIPPED | OUTPATIENT
Start: 2019-12-02 | End: 2019-12-11 | Stop reason: SDUPTHER

## 2019-12-02 RX ORDER — ZIPRASIDONE HYDROCHLORIDE 60 MG/1
60 CAPSULE ORAL 2 TIMES DAILY WITH MEALS
Qty: 60 CAPSULE | Refills: 0 | Status: SHIPPED | OUTPATIENT
Start: 2019-12-02 | End: 2019-12-11 | Stop reason: SDUPTHER

## 2019-12-11 ENCOUNTER — OFFICE VISIT (OUTPATIENT)
Dept: PSYCHIATRY | Age: 34
End: 2019-12-11
Payer: COMMERCIAL

## 2019-12-11 DIAGNOSIS — F41.9 ANXIETY: ICD-10-CM

## 2019-12-11 DIAGNOSIS — F90.0 ATTENTION DEFICIT HYPERACTIVITY DISORDER (ADHD), PREDOMINANTLY INATTENTIVE TYPE: ICD-10-CM

## 2019-12-11 DIAGNOSIS — F31.9 BIPOLAR 1 DISORDER (HCC): Primary | ICD-10-CM

## 2019-12-11 PROCEDURE — 99214 OFFICE O/P EST MOD 30 MIN: CPT | Performed by: PSYCHIATRY & NEUROLOGY

## 2019-12-11 RX ORDER — HYDROXYZINE PAMOATE 25 MG/1
25 CAPSULE ORAL 2 TIMES DAILY PRN
Qty: 60 CAPSULE | Refills: 0 | Status: SHIPPED | OUTPATIENT
Start: 2019-12-11 | End: 2019-12-25

## 2019-12-11 RX ORDER — ZIPRASIDONE HYDROCHLORIDE 40 MG/1
40 CAPSULE ORAL 2 TIMES DAILY WITH MEALS
Qty: 60 CAPSULE | Refills: 2 | Status: SHIPPED | OUTPATIENT
Start: 2019-12-11 | End: 2022-01-20

## 2019-12-11 RX ORDER — GABAPENTIN 800 MG/1
800 TABLET ORAL 3 TIMES DAILY
Qty: 90 TABLET | Refills: 2 | Status: SHIPPED | OUTPATIENT
Start: 2019-12-11 | End: 2022-01-20

## 2019-12-11 RX ORDER — METHYLPHENIDATE HYDROCHLORIDE 54 MG/1
54 TABLET ORAL DAILY
Qty: 30 TABLET | Refills: 0 | Status: SHIPPED | OUTPATIENT
Start: 2019-12-11 | End: 2020-01-06 | Stop reason: SDUPTHER

## 2019-12-11 RX ORDER — LAMOTRIGINE 100 MG/1
100 TABLET ORAL DAILY
Qty: 30 TABLET | Refills: 0 | Status: SHIPPED | OUTPATIENT
Start: 2019-12-11 | End: 2020-01-06 | Stop reason: SDUPTHER

## 2020-01-06 RX ORDER — LAMOTRIGINE 100 MG/1
100 TABLET ORAL DAILY
Qty: 30 TABLET | Refills: 0 | Status: SHIPPED | OUTPATIENT
Start: 2020-01-06 | End: 2020-02-10 | Stop reason: SDUPTHER

## 2020-01-06 RX ORDER — METHYLPHENIDATE HYDROCHLORIDE 54 MG/1
54 TABLET ORAL DAILY
Qty: 30 TABLET | Refills: 0 | Status: SHIPPED | OUTPATIENT
Start: 2020-01-06 | End: 2020-02-10 | Stop reason: SDUPTHER

## 2020-01-06 RX ORDER — TRAZODONE HYDROCHLORIDE 150 MG/1
150 TABLET ORAL NIGHTLY PRN
Qty: 30 TABLET | Refills: 0 | Status: SHIPPED | OUTPATIENT
Start: 2020-01-06 | End: 2020-02-10 | Stop reason: SDUPTHER

## 2020-02-10 RX ORDER — TRAZODONE HYDROCHLORIDE 150 MG/1
150 TABLET ORAL NIGHTLY PRN
Qty: 30 TABLET | Refills: 0 | Status: SHIPPED | OUTPATIENT
Start: 2020-02-10 | End: 2022-01-20

## 2020-02-10 RX ORDER — METHYLPHENIDATE HYDROCHLORIDE 54 MG/1
54 TABLET ORAL DAILY
Qty: 30 TABLET | Refills: 0 | Status: SHIPPED | OUTPATIENT
Start: 2020-02-10 | End: 2022-01-20

## 2020-02-10 RX ORDER — LAMOTRIGINE 100 MG/1
100 TABLET ORAL DAILY
Qty: 30 TABLET | Refills: 0 | Status: SHIPPED | OUTPATIENT
Start: 2020-02-10 | End: 2022-01-20

## 2022-01-19 RX ORDER — SODIUM CHLORIDE 450 MG/100ML
INJECTION, SOLUTION INTRAVENOUS CONTINUOUS
Status: CANCELLED | OUTPATIENT
Start: 2022-01-19

## 2022-01-20 ENCOUNTER — HOSPITAL ENCOUNTER (OUTPATIENT)
Dept: CT IMAGING | Age: 37
Discharge: HOME | End: 2022-01-20
Payer: MEDICARE

## 2022-01-20 ENCOUNTER — HOSPITAL ENCOUNTER (OUTPATIENT)
Dept: GENERAL RADIOLOGY | Age: 37
Discharge: HOME | End: 2022-01-20
Payer: MEDICARE

## 2022-01-20 ENCOUNTER — HOSPITAL ENCOUNTER (OUTPATIENT)
Dept: INTERVENTIONAL RADIOLOGY/VASCULAR | Age: 37
Discharge: HOME | End: 2022-01-20
Payer: MEDICARE

## 2022-01-20 VITALS
RESPIRATION RATE: 18 BRPM | DIASTOLIC BLOOD PRESSURE: 49 MMHG | OXYGEN SATURATION: 99 % | HEART RATE: 90 BPM | TEMPERATURE: 96.8 F | SYSTOLIC BLOOD PRESSURE: 120 MMHG

## 2022-01-20 DIAGNOSIS — M54.16 LUMBAR RADICULOPATHY: ICD-10-CM

## 2022-01-20 DIAGNOSIS — M48.061 BILATERAL STENOSIS OF LATERAL RECESS OF LUMBAR SPINE: ICD-10-CM

## 2022-01-20 DIAGNOSIS — M51.37 DDD (DEGENERATIVE DISC DISEASE), LUMBOSACRAL: ICD-10-CM

## 2022-01-20 DIAGNOSIS — R52 PAIN: ICD-10-CM

## 2022-01-20 PROCEDURE — 2709999900 IR LUMBAR PUNCTURE FOR MYELOGRAM  CT

## 2022-01-20 PROCEDURE — 2580000003 HC RX 258: Performed by: RADIOLOGY

## 2022-01-20 PROCEDURE — 72132 CT LUMBAR SPINE W/DYE: CPT

## 2022-01-20 PROCEDURE — 6360000004 HC RX CONTRAST MEDICATION: Performed by: RADIOLOGY

## 2022-01-20 RX ORDER — HYDROCODONE BITARTRATE AND ACETAMINOPHEN 5; 325 MG/1; MG/1
1 TABLET ORAL EVERY 6 HOURS PRN
COMMUNITY
End: 2022-04-11 | Stop reason: ALTCHOICE

## 2022-01-20 RX ORDER — SODIUM CHLORIDE 450 MG/100ML
INJECTION, SOLUTION INTRAVENOUS CONTINUOUS
Status: DISCONTINUED | OUTPATIENT
Start: 2022-01-20 | End: 2022-01-21 | Stop reason: HOSPADM

## 2022-01-20 RX ADMIN — SODIUM CHLORIDE: 4.5 INJECTION, SOLUTION INTRAVENOUS at 09:28

## 2022-01-20 RX ADMIN — IOHEXOL 18 ML: 180 INJECTION INTRAVENOUS at 11:08

## 2022-01-20 ASSESSMENT — PAIN DESCRIPTION - ORIENTATION: ORIENTATION: MID

## 2022-01-20 ASSESSMENT — PAIN SCALES - GENERAL: PAINLEVEL_OUTOF10: 7

## 2022-01-20 ASSESSMENT — PAIN DESCRIPTION - PAIN TYPE: TYPE: CHRONIC PAIN

## 2022-01-20 ASSESSMENT — PAIN DESCRIPTION - LOCATION: LOCATION: BACK

## 2022-01-20 NOTE — PROGRESS NOTES
0900 Patient ambulatory to \[facility]\"" for Myelogram. Patient verbalizes understanding of procedure. Patient confirms NPO, denies being on any blood thinners. Has  for discharge. PT RIGHTS AND RESPONSIBILITIES OFFERED TO PT.    [id number] Patient states she has chronic back pain. 7/10 Pain in mid back. Radiates to right leg. States numbness, tingling, all the time. 1145 Patient back from Myelogram. Band aid to lower back intact. No signs of bleeding area soft. Patient able to move extremities. No increased pain or numbness. Vital signs stable. 1200 Patient resting in bed. Denies any complaints. Band aid to lower back dry and intact. 1230 Patient resting in bed. Denies any complaints. 1300 Patient doing well. AVS reviewed with patient. Verbalizes understanding. Patient discharged in wheelchair.      __M__ Safety:       (Environmental)   Lavelle to environment   Ensure ID band is correct and in place/ allergy band as needed   Assess for fall risk   Initiate fall precautions as applicable (fall band, side rails, etc.)   Call light within reach   Bed in low position/ wheels locked    __M__ Pain:        Assess pain level and characteristics   Administer analgesics as ordered   Assess effectiveness of pain management and report to MD as needed    __M__ Knowledge Deficit:   Assess baseline knowledge   Provide teaching at level of understanding   Provide teaching via preferred learning method   Evaluate teaching effectiveness    __M__ Hemodynamic/Respiratory Status:       (Pre and Post Procedure Monitoring)   Assess/Monitor vital signs and LOC   Assess Baseline SpO2 prior to any sedation   Obtain weight/height   Assess vital signs/ LOC until patient meets discharge criteria   Monitor procedure site and notify MD of any issues

## 2022-02-10 ENCOUNTER — HOSPITAL ENCOUNTER (OUTPATIENT)
Dept: NUCLEAR MEDICINE | Age: 37
Discharge: HOME | End: 2022-02-10

## 2022-02-10 ENCOUNTER — HOSPITAL ENCOUNTER (OUTPATIENT)
Dept: NUCLEAR MEDICINE | Age: 37
Discharge: HOME | End: 2022-02-10
Payer: MEDICARE

## 2022-02-10 DIAGNOSIS — M48.061 SPINAL STENOSIS, LUMBAR REGION WITHOUT NEUROGENIC CLAUDICATION: ICD-10-CM

## 2022-02-10 PROCEDURE — 3430000000 HC RX DIAGNOSTIC RADIOPHARMACEUTICAL: Performed by: ORTHOPAEDIC SURGERY

## 2022-02-10 PROCEDURE — A9503 TC99M MEDRONATE: HCPCS | Performed by: ORTHOPAEDIC SURGERY

## 2022-02-10 PROCEDURE — 78306 BONE IMAGING WHOLE BODY: CPT

## 2022-02-10 RX ORDER — TC 99M MEDRONATE 20 MG/10ML
25 INJECTION, POWDER, LYOPHILIZED, FOR SOLUTION INTRAVENOUS
Status: COMPLETED | OUTPATIENT
Start: 2022-02-10 | End: 2022-02-10

## 2022-02-10 RX ADMIN — TC 99M MEDRONATE 24.1 MILLICURIE: 20 INJECTION, POWDER, LYOPHILIZED, FOR SOLUTION INTRAVENOUS at 08:10

## 2022-03-10 ENCOUNTER — OFFICE VISIT (OUTPATIENT)
Dept: NEUROSURGERY | Age: 37
End: 2022-03-10
Payer: MEDICARE

## 2022-03-10 VITALS
WEIGHT: 227.96 LBS | HEIGHT: 65 IN | HEART RATE: 98 BPM | SYSTOLIC BLOOD PRESSURE: 133 MMHG | DIASTOLIC BLOOD PRESSURE: 88 MMHG | BODY MASS INDEX: 37.98 KG/M2

## 2022-03-10 DIAGNOSIS — M48.061 SPINAL STENOSIS OF LUMBAR REGION, UNSPECIFIED WHETHER NEUROGENIC CLAUDICATION PRESENT: Primary | ICD-10-CM

## 2022-03-10 DIAGNOSIS — M54.16 LUMBAR RADICULOPATHY: ICD-10-CM

## 2022-03-10 PROCEDURE — G8417 CALC BMI ABV UP PARAM F/U: HCPCS | Performed by: PHYSICIAN ASSISTANT

## 2022-03-10 PROCEDURE — 4004F PT TOBACCO SCREEN RCVD TLK: CPT | Performed by: PHYSICIAN ASSISTANT

## 2022-03-10 PROCEDURE — G8484 FLU IMMUNIZE NO ADMIN: HCPCS | Performed by: PHYSICIAN ASSISTANT

## 2022-03-10 PROCEDURE — 99203 OFFICE O/P NEW LOW 30 MIN: CPT | Performed by: PHYSICIAN ASSISTANT

## 2022-03-10 PROCEDURE — G8427 DOCREV CUR MEDS BY ELIG CLIN: HCPCS | Performed by: PHYSICIAN ASSISTANT

## 2022-03-10 ASSESSMENT — ENCOUNTER SYMPTOMS
APNEA: 0
CHEST TIGHTNESS: 0
BACK PAIN: 1
ABDOMINAL DISTENTION: 0
ABDOMINAL PAIN: 0
SHORTNESS OF BREATH: 0

## 2022-03-10 NOTE — PROGRESS NOTES
[facility] PROFESSIONAL SERVS  [address] [place] [address]  [facility]  Dept: [phone]  Dept Fax: [phone]      Patient Name:  Wilson Dee  Visit Date:  3/10/2022    HPI:     Ms. Nelta Sacks is a 40 y.o. female that presents today at [facility] for evaluation of the following: Patient arrives as a referral for evaluation of radiating low back pain secondary to lumbar spinal stenosis and foraminal stenosis. Chief Complaint   Patient presents with    Consultation     Spinal Stenosis lumbar region        HPI   Mrs. Nelta Sacks is a pleasant, active 15-year-old female, a current every day smoker tobacco with 1/2 pack 10-year history who denies alcohol use and has a medical history significant for attention deficit hyperactivity disorder, bipolar disorder, depression and anxiety, fibromyalgia, pseudotumor cerebri with ventriculoperitoneal shunt, Chiari malformation and radiating back pain who presents to our service as a referral for radiating back pain secondary to canal and foraminal stenosis of the lumbar spine as evidenced on a recent CT scan of the lumbar spine imaged on 1/20/2022 which additionally reveals degenerative changes at lumbar 4 5 moderate to severe canal and bilateral foraminal stenosis is evident. Degenerative changes involving the SI joints bilaterally are noted. She arrives today ambulating with the assistance of a cane and accompanied by her daughter. She arrives to our service having been referred to us by Dr. Rosita Barker spine orthopedic surgeon. The referral was made primarily due to the presence of a lumbar catheter initially placed in 2009 to address normal pressure hydrocephalus by Dr. Sangita Candelaria previously of [facility]. The device is no longer functioning and has been capped but remains in place.   Of note: [facility] of ventriculoperitoneal shunt was placed to address normal pressure hydrocephalus with additional diagnoses of pseudotumor cerebri as an indication. She states that she has not been seen or evaluated by that service since 2019. At this visit she also related prior Chiari decompression, date unspecified. She describes the pain is primary low back with radiation into the legs bilaterally in a right greater than left distribution. Pain will radiate primarily to the knee on the right side with occasional radiation to the feet. Symptoms are also inclusive for numbness and tingling in the lower extremities bilaterally with diffuse lower extremity weakness related. She is attempted injection therapy performed by Dr. Rosita Barker office in 2018 which provided only transient relief but is not been seen or evaluated by pain management specialist.  She has undergone physical therapy without relief and is treating her symptoms with ibuprofen having transition to ibuprofen from hydrocodone, previously described by Dr. Rosita Barker. Medications:    Current Outpatient Medications:     ibuprofen (ADVIL;MOTRIN) 200 MG tablet, Take 800 mg by mouth every 8 hours as needed for Pain, Disp: , Rfl:     HYDROcodone-acetaminophen (NORCO) 5-325 MG per tablet, Take 1 tablet by mouth every 6 hours as needed for Pain. (Patient not taking: Reported on 3/10/2022), Disp: , Rfl:     The patient has No Known Allergies. Past Medical History  Marcia Bell  has a past medical history of ADHD (attention deficit hyperactivity disorder), Bipolar disorder ([facility] [address] Chiari malformation, Depression, Fibromyalgia, and Pseudotumor cerebri. Past Surgical History  The patient  has a past surgical history that includes Cholecystectomy (2007); other surgical history (2007); other surgical history; and Ventriculoperitoneal shunt. Family History  This patient's family history includes Alzheimer's Disease in her father; Heart Disease in her father; Hypertension in her mother; Stroke in her father.     Social History  Lillian Do  reports that she has been smoking cigarettes. She has a 10.00 pack-year smoking history. She has never used smokeless tobacco. She reports that she does not drink alcohol and does not use drugs. Subjective:      Review of Systems   Constitutional: Positive for activity change. Respiratory: Negative for apnea, chest tightness and shortness of breath. Gastrointestinal: Negative for abdominal distention and abdominal pain. Genitourinary: Negative for difficulty urinating. Musculoskeletal: Positive for back pain and gait problem. Neurological: Positive for weakness and numbness. Negative for dizziness and headaches. Psychiatric/Behavioral: Negative for agitation, behavioral problems, confusion and decreased concentration. Objective:     /88 (Site: Left Lower Arm, Position: Sitting, Cuff Size: Large Adult)   Pulse 98   Ht 5' 5\" (1.651 m)   Wt 227 lb 15.3 oz (103.4 kg)   BMI 37.93 kg/m²      Examination of carotid arteries (puls, amplitude, bruits) or Examination of peripheral vascular system  (swelling, varicosities and pulses, temperature, edema,tenderness : Negative  Patient is alerted w/0 x3: Intact  Muscle strength, tone in all 4 extremities: Diffuse lower extremity weakness right greater than left estimated at 4/5 throughout, near 5/5 strength for left lower  DTR in all 4 extremities: Somewhat hypoactive bilaterally  Babinski: Negative bilateral  Gait: Antalgic  Cerebellar function: Smooth  Sensation: Diffuse lower extremity pinprick deficit specifically posterior lateral thighs bilaterally to the knee and feet bilaterally circumferential  Straight leg raising test: Positive straight leg raise elicited for right, negative left    Physical Exam  Constitutional:       Appearance: She is obese. HENT:      Head: Normocephalic and atraumatic. Eyes:      Extraocular Movements: Extraocular movements intact. Pupils: Pupils are equal, round, and reactive to light. intervention in the form of posterior lumbar decompression and instrumented fusion with Dr. Dennie Fothergill. A brief discussion involving this possible surgical option was had with a more detailed discussion and surgical planning a consent considered following pain management intervention and additional study review by the surgeon. She remains very happy with today's visit having questions and concerns addressed and answered and looks forward to her follow-up appointment.        Electronically signed by Mal Paul PA-C on 3/10/2022 at 10:35 AM

## 2022-04-11 ENCOUNTER — OFFICE VISIT (OUTPATIENT)
Dept: PHYSICAL MEDICINE AND REHAB | Age: 37
End: 2022-04-11
Payer: MEDICARE

## 2022-04-11 VITALS
WEIGHT: 239 LBS | DIASTOLIC BLOOD PRESSURE: 80 MMHG | HEIGHT: 65 IN | BODY MASS INDEX: 39.82 KG/M2 | SYSTOLIC BLOOD PRESSURE: 106 MMHG

## 2022-04-11 DIAGNOSIS — M79.2 NEUROPATHIC PAIN: ICD-10-CM

## 2022-04-11 DIAGNOSIS — M47.819 FACET ARTHROPATHY: ICD-10-CM

## 2022-04-11 DIAGNOSIS — M47.816 LUMBAR SPONDYLOSIS: ICD-10-CM

## 2022-04-11 DIAGNOSIS — M53.3 SACROILIAC JOINT DYSFUNCTION OF BOTH SIDES: Primary | ICD-10-CM

## 2022-04-11 DIAGNOSIS — G89.4 CHRONIC PAIN SYNDROME: ICD-10-CM

## 2022-04-11 PROCEDURE — G8417 CALC BMI ABV UP PARAM F/U: HCPCS | Performed by: NURSE PRACTITIONER

## 2022-04-11 PROCEDURE — 4004F PT TOBACCO SCREEN RCVD TLK: CPT | Performed by: NURSE PRACTITIONER

## 2022-04-11 PROCEDURE — 99204 OFFICE O/P NEW MOD 45 MIN: CPT | Performed by: NURSE PRACTITIONER

## 2022-04-11 PROCEDURE — G8427 DOCREV CUR MEDS BY ELIG CLIN: HCPCS | Performed by: NURSE PRACTITIONER

## 2022-04-11 RX ORDER — MELOXICAM 15 MG/1
15 TABLET ORAL DAILY
Qty: 30 TABLET | Refills: 0 | Status: SHIPPED | OUTPATIENT
Start: 2022-04-11 | End: 2022-05-31 | Stop reason: SINTOL

## 2022-04-11 NOTE — PROGRESS NOTES
Chronic Pain/PM&R Clinic Note     Encounter Date: 4/11/22    Subjective:   Chief Complaint:   Chief Complaint   Patient presents with    New Patient     Spinal Stenosis of lumbar region       History of Present Illness:   Stefanie Rodriguez is a 40 y.o. female seen in the clinic initially on 04/11/2022 upon request from Vitaliy Cook  for her history of low back pain. States she has had low back pain for several years but it has been getting significantly worse in the last year, especially since October 2021. Patient denies any prior events that may have caused aggravation of her pain. Patient states she tries to stay active but has been very difficult due to her pain level. She states she hears grinding in her right low back. She states pain is mainly located right low back radiating down her right leg to her right knee. She will occasionally get pain that shoots down to her right foot, this is dependent on how active she is. She has recently noticed some pain in her left low back radiating into her left leg. Patient states her pain is worse with increased activity and better at rest.  Patient states she uses a cane to help take the pressure off her back. When she is out shopping she has to use a cart to help take the pressure off her back. Patient currently walks with a cane outside of the home. She states she did fall last week when crouching down under a desk and lost her balance. She does state she feels like she is off balance frequently. Patient states she has trouble sleeping because she cannot lay in 1 position for very long without having pain. Patient states she will sometimes get pressure in her right hip and it feels like it is swollen. Patient states she did physical therapy a few years back which was ineffective and almost made things worse. Patient denies any bowel/bladder incontinence or saddle anesthesia.   Patient currently moved back here from [place] within the last few months and she has yet to get established with a family doctor. She currently lives at home with her 3 kids ranging from ages 14-13. She is currently working on line to get her masters degree. 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 She currently has a ventriculoperitoneal shunt, but it is shut off. She also has a history of Chiari malformation and decompression. He was previously seeing a specialist in [place] but has not been there since 2019. History of Interventions:   Surgery: No previous lumbar surgeries  Injections: None  2018 (Ordered by Dr. Susi Reynolds) - in office for sciatica. Current Treatment Medications:   Ibuprofen - relatively ineffective   Tylenol - ineffecitve     Historical Treatment Medications:   Flexeril - ineffective  Baclofen - ineffective   Gabapentin - helps a little. 100mg TID   Lyrica - s/e  Cymbalta - fibromyalgia, withdrawal was terrible  Amitriptyline - depression - bipolar - not recently. Zanaflex - ineffective   Mobic - some relief   [place]    Imaging:  CT Lumbar (01/20/2022)  Narrative   PROCEDURE: CT LUMBAR SPINE W CONTRAST       CLINICAL INFORMATION: DDD (degenerative disc disease), lumbosacral, Bilateral stenosis of lateral recess of lumbar spine, Lumbar radiculopathy.       COMPARISON: Intra-articular administration of contrast performed on the same day. Plain radiographs dated 21 March 2010. .       TECHNIQUE: 3 mm axial images were obtained of the lumbar spine following intrathecal administration of 18 mL of Omnipaque 180. Sagittal and coronal reconstructions were obtained.  Angled images were reconstructed through the L3-4, L4-5 and L5-S1 disc    levels.       All CT scans at this facility use dose modulation, iterative reconstruction, and/or weight-based dosing when appropriate to reduce radiation dose to as low as reasonably achievable.       FINDINGS:               The lumbar vertebral bodies are normally aligned. Doc [place] are no compression fractures.  No pars defects are noted. There appears be an intraspinal catheter entering the spine at L4-5       There are no gross abnormalities within the spinal canal.       On the axial images, at T11-T12, T12-L1 and L1-L2, there is no significant disc herniation, canal or foraminal stenosis.       At L2-3, there is a 2.6 mm disc protrusion. This results in mild-to-moderate canal and bilateral foraminal stenosis.       At L3-4, there is no disc herniation, canal or foraminal stenosis.       At L4-5, there is a 4 mm disc protrusion and facet hypertrophy this results in moderate severe canal and bilateral foraminal stenosis.       At L5-S1, there is a 1.8 mm disc protrusion and facet hypertrophy. This results in mild canal and moderate bilateral foraminal stenosis.       There is mild degenerative change involving the sacroiliac joints bilaterally       There is a 16 mm sclerotic lesion. In the left iliac crest.       There is a possible intraperitoneal catheter present.               Impression       1. Degenerative changes most marked at L4-5, at which level there is moderate to severe canal and bilateral foraminal stenosis. There appears to be an intraspinal catheter entering the spine at L4-5.   2. There is mild canal and moderate bilateral foraminal stenosis at L5-S1. .   3. There is mild-to-moderate canal and bilateral foraminal stenosis at L2-3.   4. There is degenerative change involving the sacroiliac joints bilaterally. 5. There is a 16 mm sclerotic lesion involving the left iliac crest.   6. There is a possible intraperitoneal catheter present. .                   **This report has been created using voice recognition software. It may contain minor errors which are inherent in voice recognition technology. **       Final report electronically signed by DR Navid Tripp on 1/20/2022 11:55 AM         Past Medical History:   Diagnosis Date    ADHD (attention deficit hyperactivity disorder)     Bipolar disorder ([facility] [address]     Chiari malformation     Depression     Fibromyalgia     Pseudotumor cerebri        Past Surgical History:   Procedure Laterality Date    CHOLECYSTECTOMY  2007    OTHER SURGICAL HISTORY  2007    lumbar shunt/ shunt    OTHER SURGICAL HISTORY      chiari decompression    VENTRICULOPERITONEAL SHUNT         Family History   Problem Relation Age of Onset    Hypertension Mother     Alzheimer's Disease Father     Stroke Father     Heart Disease Father          Medications & Allergies:   Current Outpatient Medications   Medication Instructions    ibuprofen (ADVIL;MOTRIN) 800 mg, Oral, EVERY 8 HOURS PRN    meloxicam (MOBIC) 15 mg, Oral, DAILY       No Known Allergies    Review of Systems:   Constitutional: negative for weight changes or fevers  Genitourinary: negative for bowel/bladder incontinence   Musculoskeletal: positive for low back pain, right leg pain  Neurological: negative for any leg weakness or numbness/tingling  Behavioral/Psych: negative for anxiety/depression   All other systems reviewed and are negative    Objective:     Vitals:    04/11/22 0801   BP: 106/80       Constitutional: Pleasant, no acute distress   Head: Normocephalic, atraumatic   Eyes: Conjunctivae normal   Neck: Supple, symmetrical   Lungs: Normal respiratory effort, non-labored breathing   Cardiovascular: Limbs warm and well perfused   Abdomen: Non-protruded   Musculoskeletal: Muscle bulk symmetric, no atrophy, no gross deformities   · Lower Extremities: ROM WNL. · Thorax: No paraspinal tenderness bilaterally. No scoliosis or kyphosis. · Lumbar Spine: ROM WNL. Lumbar paraspinals mildly tender to palpation bilaterally. SLR neg bilaterally. ROSSY positive bilaterally. GAENSLEN positive bilaterally. Positive SI joint distraction bilaterally. Positive facet loading bilaterally. Bilateral SI joints non-tender to palpation.  Bilateral greater trochanters non-tender to palpation. Neurological: Cranial nerves II-XII grossly intact. · Gait - Antalgic gait. Ambulates with assistive device (cane). · Motor: 3/5 muscle strength in bilateral hip flexion, knee flexion, knee extension, ankle dorsiflexion, and ankle plantar flexion (pain limited)  · Sensory: LT sensation intact in lower limbs, tingling through bilateral legs   · Reflexes: 2+ symmetrical in bilateral achilles, 2+ bilateral patellar, negative ankle clonus, downgoing babinski   Skin: No rashes or lesions present   Psychological: Cooperative, no exaggerated pain behaviors     Assessment:    Diagnosis Orders   1. Sacroiliac joint dysfunction of both sides  CHG FLUOR NEEDLE/CATH SPINE/PARASPINAL DX/THER ADDON    ID INJECT SI JOINT ARTHRGRPHY&/ANES/STEROID W/IMAGE   2. Lumbar spondylosis     3. Facet arthropathy     4. Chronic pain syndrome     5. Neuropathic pain           Sherin Yates is a 40 y. o.female presenting to the pain clinic for evaluation of low back and right leg pain. Patient's history and physical consistent with bilateral sacroiliac joint dysfunction. I have set her up for a bilateral SI joint injection with Dr. Jaime Vega. We discussed that she likely has several pain generators including lumbar radiculopathy and lumbar facet mediated pain. We discussed the potential of doing an SI ablation if she fails the SI injection or only receives a short amount of relief. We also briefly discussed the possibility of doing lumbar epidurals first facet injections in the future. Plan: The following treatment recommendations and plan were discussed in detail with Sherin Yates. Imaging:   I have reviewed patients imaging of lumbar CT and results were discussed with patient today. Analgesics:   Patient is taking Acetaminophen. Patient informed that the maximum amount of acetaminophen taken on a regular basis should only be 4000 mg per day. I started the patient on Mobic.  Patient is advised to take as prescribed and not take on an empty stomach. Adjuvants:   None    Interventions: With examination consistent with bilateral sacroiliac dysfunction/pain, we will proceed with a bilateral sacroiliac joint injection. The risks and benefits were discussed in detail with the patient. Patient wants to proceed with the injection. Anticoagulation/NPO Recommendations:   Patient does not need to hold any medications prior to the procedure. Patient does not need to be NPO prior to the procedure. We will do a LOCAL injection    Multidisciplinary Pain Management:   In the presence of complex, chronic, and multi-factorial pain, the importance of a multidisciplinary approach to pain management in the patients management regimen was emphasized and discussed in great detail. PHYSICAL THERAPY: Patient is advised to see a physical therapist for gentle stretching exercises and conditioning exercises for management of pain.    PSYCHOLOGY: Patient is advised to see a clinical pain psychologist, for the psychosocial aspect of pain care through coping skills, relaxation strategies, cognitive group therapy etc.     Referrals:  None    Prescriptions Written This Visit:   Mobic 15mg    Follow-up: B/L SI joint injection     Patsy Valentin, LIBAN - CNP

## 2022-04-27 ENCOUNTER — PREP FOR PROCEDURE (OUTPATIENT)
Dept: PHYSICAL MEDICINE AND REHAB | Age: 37
End: 2022-04-27

## 2022-05-02 NOTE — H&P
Today, patient presents for planned bilateral sacroiliac joint injection. This note is reflective of the patient's previous visit for evaluation. We will proceed with today's planned procedure. Since patient's last visit for evaluation, there have been no interval changes in medical history. Patient has no new numbness, weakness, or focal neurological deficit since evaluation. Patient has no contraindications to injection (no anticoagulation or recent antibiotic intake for active infections), and has a  present or is able to drive themselves (as discussed and cleared by physician). Allergies to latex, contrast dye, and steroid medications have been confirmed with the patient prior to the procedure. NPO necessity has been assessed and accepted based on procedure complexity. The risks and benefits of the procedure have been explained including but are not limited to infection, bleeding, paralysis, immediate post procedure weakness, and dizziness; the patient acknowledges understanding and desires to proceed with the procedure. Patient has signed consent for same procedure as discussed in previous clinic encounter. All other questions and concerns were addressed at bedside. See procedure note for full details. Post procedure Instructions: The patient was advised not to drive during the day of the procedure and not to engage in any significant decision making (unless otherwise states by physician). The patient was also advised to be cautious with walking/activity for 24 hours following today's visit and asked not to engage in over-exertion (unless otherwise states by physician). After this time, it is ok to resume pre-procedure level of activity. Patient advised to apply ice to site of injection in situations of pain and discomfort. Patient advised to not submerge site of injection during bath or pool activities for approximately 24 hours post-procedure.  Patient attested to understanding post procedure directions / restrictions. All other questions and concerns addressed before patient discharge in ambulatory fashion. Chronic Pain/PM&R Clinic Note     Encounter Date: 4/11/22    Subjective:   Chief Complaint:   No chief complaint on file. History of Present Illness:   Naveen Merida is a 40 y.o. female seen in the clinic initially on 04/11/2022 upon request from Vitaliy Cook  for her history of low back pain. States she has had low back pain for several years but it has been getting significantly worse in the last year, especially since October 2021. Patient denies any prior events that may have caused aggravation of her pain. Patient states she tries to stay active but has been very difficult due to her pain level. She states she hears grinding in her right low back. She states pain is mainly located right low back radiating down her right leg to her right knee. She will occasionally get pain that shoots down to her right foot, this is dependent on how active she is. She has recently noticed some pain in her left low back radiating into her left leg. Patient states her pain is worse with increased activity and better at rest.  Patient states she uses a cane to help take the pressure off her back. When she is out shopping she has to use a cart to help take the pressure off her back. Patient currently walks with a cane outside of the home. She states she did fall last week when crouching down under a desk and lost her balance. She does state she feels like she is off balance frequently. Patient states she has trouble sleeping because she cannot lay in 1 position for very long without having pain. Patient states she will sometimes get pressure in her right hip and it feels like it is swollen. Patient states she did physical therapy a few years back which was ineffective and almost made things worse. Patient denies any bowel/bladder incontinence or saddle anesthesia.   Patient currently moved back here from [place] within the last few months and she has yet to get established with a family doctor. She currently lives at home with her 3 kids ranging from ages 14-13. She is currently working on line to get her masters degree. 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 She currently has a ventriculoperitoneal shunt, but it is shut off. She also has a history of Chiari malformation and decompression. He was previously seeing a specialist in [facility] but has not been there since 2019. History of Interventions:   Surgery: No previous lumbar surgeries  Injections: None  2018 (Ordered by Dr. Severa Peace) - in office for sciatica. Current Treatment Medications:   Ibuprofen - relatively ineffective   Tylenol - ineffecitve     Historical Treatment Medications:   Flexeril - ineffective  Baclofen - ineffective   Gabapentin - helps a little. 100mg TID   Lyrica - s/e  Cymbalta - fibromyalgia, withdrawal was terrible  Amitriptyline - depression - bipolar - not recently. Zanaflex - ineffective   Mobic - some relief   [place]    Imaging:  CT Lumbar (01/20/2022)  Narrative   PROCEDURE: CT LUMBAR SPINE W CONTRAST       CLINICAL INFORMATION: DDD (degenerative disc disease), lumbosacral, Bilateral stenosis of lateral recess of lumbar spine, Lumbar radiculopathy.       COMPARISON: Intra-articular administration of contrast performed on the same day. Plain radiographs dated 21 March 2010. .       TECHNIQUE: 3 mm axial images were obtained of the lumbar spine following intrathecal administration of 18 mL of Omnipaque 180. Sagittal and coronal reconstructions were obtained.  Angled images were reconstructed through the L3-4, L4-5 and L5-S1 disc    levels.       All CT scans at this facility use dose modulation, iterative reconstruction, and/or weight-based dosing when appropriate to reduce radiation dose to as low as reasonably achievable.       FINDINGS:             The lumbar vertebral bodies are normally aligned.  There are no compression fractures.  No pars defects are noted. There appears be an intraspinal catheter entering the spine at L4-5       There are no gross abnormalities within the spinal canal.       On the axial images, at T11-T12, T12-L1 and L1-L2, there is no significant disc herniation, canal or foraminal stenosis.       At L2-3, there is a 2.6 mm disc protrusion. This results in mild-to-moderate canal and bilateral foraminal stenosis.       At L3-4, there is no disc herniation, canal or foraminal stenosis.       At L4-5, there is a 4 mm disc protrusion and facet hypertrophy this results in moderate severe canal and bilateral foraminal stenosis.       At L5-S1, there is a 1.8 mm disc protrusion and facet hypertrophy. This results in mild canal and moderate bilateral foraminal stenosis.       There is mild degenerative change involving the sacroiliac joints bilaterally       There is a 16 mm sclerotic lesion. In the left iliac crest.       There is a possible intraperitoneal catheter present.               Impression       1. Degenerative changes most marked at L4-5, at which level there is moderate to severe canal and bilateral foraminal stenosis. There appears to be an intraspinal catheter entering the spine at L4-5.   2. There is mild canal and moderate bilateral foraminal stenosis at L5-S1. .   3. There is mild-to-moderate canal and bilateral foraminal stenosis at L2-3.   4. There is degenerative change involving the sacroiliac joints bilaterally. 5. There is a 16 mm sclerotic lesion involving the left iliac crest.   6. There is a possible intraperitoneal catheter present. .                   **This report has been created using voice recognition software. It may contain minor errors which are inherent in voice recognition technology. **       Final report electronically signed by DR Sandee Arellano on 1/20/2022 11:55 AM         Past Medical History:   Diagnosis Date    ADHD (attention deficit hyperactivity disorder)     Bipolar disorder (HCC)     Chiari malformation     Depression     Fibromyalgia     Pseudotumor cerebri        Past Surgical History:   Procedure Laterality Date    CHOLECYSTECTOMY  2007    OTHER SURGICAL HISTORY  2007    lumbar shunt/ shunt    OTHER SURGICAL HISTORY      chiari decompression    VENTRICULOPERITONEAL SHUNT         Family History   Problem Relation Age of Onset    Hypertension Mother     Alzheimer's Disease Father     Stroke Father     Heart Disease Father          Medications & Allergies:   Current Outpatient Medications   Medication Instructions    ibuprofen (ADVIL;MOTRIN) 800 mg, Oral, EVERY 8 HOURS PRN    meloxicam (MOBIC) 15 mg, Oral, DAILY       No Known Allergies    Review of Systems:   Constitutional: negative for weight changes or fevers  Genitourinary: negative for bowel/bladder incontinence   Musculoskeletal: positive for low back pain, right leg pain  Neurological: negative for any leg weakness or numbness/tingling  Behavioral/Psych: negative for anxiety/depression   All other systems reviewed and are negative    Objective: There were no vitals filed for this visit. Constitutional: Pleasant, no acute distress   Head: Normocephalic, atraumatic   Eyes: Conjunctivae normal   Neck: Supple, symmetrical   Lungs: Normal respiratory effort, non-labored breathing   Cardiovascular: Limbs warm and well perfused   Abdomen: Non-protruded   Musculoskeletal: Muscle bulk symmetric, no atrophy, no gross deformities   · Lower Extremities: ROM WNL. · Thorax: No paraspinal tenderness bilaterally. No scoliosis or kyphosis. · Lumbar Spine: ROM WNL. Lumbar paraspinals mildly tender to palpation bilaterally. SLR neg bilaterally. ROSSY positive bilaterally. GAENSLEN positive bilaterally. Positive SI joint distraction bilaterally. Positive facet loading bilaterally.  Bilateral SI joints non-tender to palpation. Bilateral greater trochanters non-tender to palpation. Neurological: Cranial nerves II-XII grossly intact. · Gait - Antalgic gait. Ambulates with assistive device (cane). · Motor: 3/5 muscle strength in bilateral hip flexion, knee flexion, knee extension, ankle dorsiflexion, and ankle plantar flexion (pain limited)  · Sensory: LT sensation intact in lower limbs, tingling through bilateral legs   · Reflexes: 2+ symmetrical in bilateral achilles, 2+ bilateral patellar, negative ankle clonus, downgoing babinski   Skin: No rashes or lesions present   Psychological: Cooperative, no exaggerated pain behaviors     Assessment:    Diagnosis Orders   1. Sacroiliac joint dysfunction of both sides  CHG FLUOR NEEDLE/CATH SPINE/PARASPINAL DX/THER ADDON    OK INJECT SI JOINT ARTHRGRPHY&/ANES/STEROID W/IMAGE   2. Lumbar spondylosis     3. Facet arthropathy     4. Chronic pain syndrome     5. Neuropathic pain           Veronica Jarrett is a 40 y. o.female presenting to the pain clinic for evaluation of low back and right leg pain. Patient's history and physical consistent with bilateral sacroiliac joint dysfunction. I have set her up for a bilateral SI joint injection with Dr. Harshal Ramirez. We discussed that she likely has several pain generators including lumbar radiculopathy and lumbar facet mediated pain. We discussed the potential of doing an SI ablation if she fails the SI injection or only receives a short amount of relief. We also briefly discussed the possibility of doing lumbar epidurals first facet injections in the future. Plan: The following treatment recommendations and plan were discussed in detail with Veronica Jarrett. Imaging:   I have reviewed patients imaging of lumbar CT and results were discussed with patient today. Analgesics:   Patient is taking Acetaminophen.  Patient informed that the maximum amount of acetaminophen taken on a regular basis should only be 4000 mg per day.     I started the patient on Mobic. Patient is advised to take as prescribed and not take on an empty stomach. Adjuvants:   None    Interventions: With examination consistent with bilateral sacroiliac dysfunction/pain, we will proceed with a bilateral sacroiliac joint injection. The risks and benefits were discussed in detail with the patient. Patient wants to proceed with the injection. Anticoagulation/NPO Recommendations:   Patient does not need to hold any medications prior to the procedure. Patient does not need to be NPO prior to the procedure. We will do a LOCAL injection    Multidisciplinary Pain Management:   In the presence of complex, chronic, and multi-factorial pain, the importance of a multidisciplinary approach to pain management in the patients management regimen was emphasized and discussed in great detail. PHYSICAL THERAPY: Patient is advised to see a physical therapist for gentle stretching exercises and conditioning exercises for management of pain.    PSYCHOLOGY: Patient is advised to see a clinical pain psychologist, for the psychosocial aspect of pain care through coping skills, relaxation strategies, cognitive group therapy etc.     Referrals:  None    Prescriptions Written This Visit:   Mobic 15mg    Follow-up: B/L SI joint injection     Rubén Prieto DO

## 2022-05-03 ENCOUNTER — APPOINTMENT (OUTPATIENT)
Dept: GENERAL RADIOLOGY | Age: 37
End: 2022-05-03
Attending: ANESTHESIOLOGY
Payer: MEDICARE

## 2022-05-03 ENCOUNTER — HOSPITAL ENCOUNTER (OUTPATIENT)
Age: 37
Setting detail: OUTPATIENT SURGERY
Discharge: HOME | End: 2022-05-03
Attending: ANESTHESIOLOGY | Admitting: ANESTHESIOLOGY
Payer: MEDICARE

## 2022-05-03 VITALS
DIASTOLIC BLOOD PRESSURE: 53 MMHG | RESPIRATION RATE: 20 BRPM | TEMPERATURE: 97.7 F | BODY MASS INDEX: 40.8 KG/M2 | SYSTOLIC BLOOD PRESSURE: 107 MMHG | HEIGHT: 64 IN | HEART RATE: 74 BPM | WEIGHT: 239 LBS | OXYGEN SATURATION: 99 %

## 2022-05-03 PROCEDURE — 99152 MOD SED SAME PHYS/QHP 5/>YRS: CPT | Performed by: ANESTHESIOLOGY

## 2022-05-03 PROCEDURE — 7100000010 HC PHASE II RECOVERY - FIRST 15 MIN: Performed by: ANESTHESIOLOGY

## 2022-05-03 PROCEDURE — 2500000003 HC RX 250 WO HCPCS: Performed by: ANESTHESIOLOGY

## 2022-05-03 PROCEDURE — 7100000011 HC PHASE II RECOVERY - ADDTL 15 MIN: Performed by: ANESTHESIOLOGY

## 2022-05-03 PROCEDURE — 3600000054 HC PAIN LEVEL 3 BASE: Performed by: ANESTHESIOLOGY

## 2022-05-03 PROCEDURE — 3209999900 FLUORO FOR SURGICAL PROCEDURES

## 2022-05-03 PROCEDURE — 6360000002 HC RX W HCPCS: Performed by: ANESTHESIOLOGY

## 2022-05-03 PROCEDURE — 6360000004 HC RX CONTRAST MEDICATION: Performed by: ANESTHESIOLOGY

## 2022-05-03 PROCEDURE — 2709999900 HC NON-CHARGEABLE SUPPLY: Performed by: ANESTHESIOLOGY

## 2022-05-03 PROCEDURE — 27096 INJECT SACROILIAC JOINT: CPT | Performed by: ANESTHESIOLOGY

## 2022-05-03 RX ORDER — METHYLPREDNISOLONE ACETATE 80 MG/ML
INJECTION, SUSPENSION INTRA-ARTICULAR; INTRALESIONAL; INTRAMUSCULAR; SOFT TISSUE PRN
Status: DISCONTINUED | OUTPATIENT
Start: 2022-05-03 | End: 2022-05-03 | Stop reason: ALTCHOICE

## 2022-05-03 RX ORDER — LIDOCAINE HYDROCHLORIDE 10 MG/ML
INJECTION, SOLUTION EPIDURAL; INFILTRATION; INTRACAUDAL; PERINEURAL PRN
Status: DISCONTINUED | OUTPATIENT
Start: 2022-05-03 | End: 2022-05-03 | Stop reason: ALTCHOICE

## 2022-05-03 RX ORDER — FENTANYL CITRATE 50 UG/ML
INJECTION, SOLUTION INTRAMUSCULAR; INTRAVENOUS PRN
Status: DISCONTINUED | OUTPATIENT
Start: 2022-05-03 | End: 2022-05-03 | Stop reason: ALTCHOICE

## 2022-05-03 RX ORDER — MIDAZOLAM HYDROCHLORIDE 1 MG/ML
INJECTION INTRAMUSCULAR; INTRAVENOUS PRN
Status: DISCONTINUED | OUTPATIENT
Start: 2022-05-03 | End: 2022-05-03 | Stop reason: ALTCHOICE

## 2022-05-03 RX ORDER — BUPIVACAINE HYDROCHLORIDE 5 MG/ML
INJECTION, SOLUTION PERINEURAL PRN
Status: DISCONTINUED | OUTPATIENT
Start: 2022-05-03 | End: 2022-05-03 | Stop reason: ALTCHOICE

## 2022-05-03 ASSESSMENT — PAIN SCALES - GENERAL: PAINLEVEL_OUTOF10: 0

## 2022-05-03 ASSESSMENT — PAIN - FUNCTIONAL ASSESSMENT: PAIN_FUNCTIONAL_ASSESSMENT: 0-10

## 2022-05-03 NOTE — POST SEDATION
[address] [address]  Sedation/Analgesia Post Sedation Record    Pt Name: Xena Arnold  MRN: [id number]  YOB: 1985  Procedure Performed By: Adria Le DO  Primary Care Physician: No primary care provider on file.     POST-PROCEDURE    Physicians/Assistants: Adria Le DO  Procedure Performed: See Procedure Note   Sedation/Anesthesia: Versed and Fentanyl (See procedure note for amount and duration)  Estimated Blood Loss:     0  ml  Specimens Removed: None        Complications: None           Marlon Wall DO  Electronically signed 5/3/2022 at 9:25 AM

## 2022-05-03 NOTE — PROGRESS NOTES
0917  Pt. Arrived in phase II per stretcher. Pt. Awake and oriented. Pt. Denies pain. No redness or drainage noted at injection sites. 0920  Pt. Taking liquids without difficulty. 0930  IV discontinued without difficulty. 0934  Pt. Up and getting dressed. No complaints offered. 5202  Pt. Discharged per ambulation with nurse. Pt. Denies complaints. Personal belongings and discharge instructions with pt.

## 2022-05-03 NOTE — PROGRESS NOTES
Discharge instructions reviewed with patient. All questions were addressed and answered. Patient  verbalized understanding of discharge plan.

## 2022-05-03 NOTE — PROCEDURES
Pre-operative Diagnosis:  SI joint pain     Post-operative Diagnosis:  SI joint pain     Procedure: Bilateral SI joint injection     Procedure Description:  After having signed the informed consent, the patient was placed in the prone position. The patient's back was prepped with chloraprep solution, and draped in a sterile fashion. A total of 2 ml of 1% lidocaine were used to anesthetize the skin and underlying tissues. Under fluoroscopic guidance a single 22-gauge, 3.5 inch spinal needle was advanced to lie within the inferior pole of the RIGHT sacroiliac joint. There were no paresthesias or heme aspiration. Needle placement was confirmed in the AP view. After negative aspiration, 0.5 ml of Omnipaque 300 contrast was injected with appropriate spread observed. A total of 2 ml of 0.5% bupivicaine mixed with 40 mg depo-medrol were injected into the sacroiliac joint. The needle was withdrawn without any complications. This exact procedure was repeated on the contralateral side. The patient tolerated the procedure well, was transported to the recovery room and observed for 15 minutes and discharged in an ambulatory fashion. No immediate reported complications.     Procedural Complications: None  Estimated Blood Loss: 0 mL      IV sedation was used during the procedure:  - Moderate intravenous conscious sedation was supervised by Dr. John Gonzalez  - The patient was independently monitored by a Registered Nurse assigned to the procedure room  - Monitoring included automated blood pressure, continuous EKG, and continuous pulse oximetry  - The detailed conscious record is permanently stored in the [address] [address]  - The following is the conscious sedation record:  Start Time: 09:08  End Time : 09:23  Duration: 15 minutes   Medications Administered: 1 mg Versed, 50 mcg Fentanyl      Marlon Mccarty DO  Interventional Pain Management/PM&R   [place]

## 2022-05-03 NOTE — PRE SEDATION
[facility]  Pre-Sedation/Analgesia History & Physical    Pt Name: Xena Arnold  MRN: [id number]  YOB: 1985  Provider Performing Procedure: Adria Le DO   Primary Care Physician: No primary care provider on file. MEDICAL HISTORY       has a past medical history of ADHD (attention deficit hyperactivity disorder), Bipolar disorder ([facility] [address] Chiari malformation, Depression, Fibromyalgia, and Pseudotumor cerebri. SURGICAL HISTORY   has a past surgical history that includes Cholecystectomy (2007); other surgical history (2007); other surgical history; and Ventriculoperitoneal shunt. ALLERGIES   Allergies as of 04/11/2022    (No Known Allergies)       MEDICATIONS   Prior to Admission medications    Medication Sig Start Date End Date Taking? Authorizing Provider   meloxicam (MOBIC) 15 MG tablet Take 1 tablet by mouth daily 4/11/22   LIBAN El CNP   ibuprofen (ADVIL;MOTRIN) 200 MG tablet Take 800 mg by mouth every 8 hours as needed for Pain    Historical Provider, MD     PHYSICAL:   Vitals:    05/03/22 0917   BP: (!) 107/53   Pulse: 74   Resp: 20   Temp: 97.7 °F (36.5 °C)   SpO2: 99%     PLANNED PROCEDURE   See procedure note  SEDATION  Planned agent: Versed and Fentanyl  ASA Classification: 1  Class 1: A normal healthy patient  Class 2: Pt with mild to moderate systemic disease  Class 3: Severe systemic disease or disturbance  Class 4: Severe systemic disorders that are already life threatening. Class 5: Moribund pt with little chances of survival, for more than 24 hours. Mallampati I Airway Classification: 1    1. Pre-procedure diagnostic studies complete and results available. 2. Previous sedation/anesthesia experiences assessed. 3. The patient is an appropriate candidate to undergo the planned procedure sedation and anesthesia. (Refer to nursing sedation/analgesia documentation record)  4.  Formulation and discussion of sedation/procedure plan, risks, and expectations with patient and/or responsible adult completed. 5. Patient examined immediately prior to the procedure.  (Refer to nursing sedation/analgesia documentation record)    July Garcia DO  Electronically signed 5/3/2022 at 9:25 AM

## 2022-05-31 ENCOUNTER — TELEPHONE (OUTPATIENT)
Dept: PHYSICAL MEDICINE AND REHAB | Age: 37
End: 2022-05-31

## 2022-05-31 ENCOUNTER — OFFICE VISIT (OUTPATIENT)
Dept: PHYSICAL MEDICINE AND REHAB | Age: 37
End: 2022-05-31
Payer: MEDICARE

## 2022-05-31 VITALS
DIASTOLIC BLOOD PRESSURE: 84 MMHG | SYSTOLIC BLOOD PRESSURE: 106 MMHG | HEIGHT: 64 IN | BODY MASS INDEX: 40.8 KG/M2 | WEIGHT: 239 LBS

## 2022-05-31 DIAGNOSIS — M53.3 SACROILIAC JOINT DYSFUNCTION OF BOTH SIDES: ICD-10-CM

## 2022-05-31 DIAGNOSIS — M79.2 NEUROPATHIC PAIN: ICD-10-CM

## 2022-05-31 DIAGNOSIS — M47.819 FACET ARTHROPATHY: ICD-10-CM

## 2022-05-31 DIAGNOSIS — G89.4 CHRONIC PAIN SYNDROME: ICD-10-CM

## 2022-05-31 DIAGNOSIS — M54.17 RADICULOPATHY, LUMBOSACRAL REGION: ICD-10-CM

## 2022-05-31 DIAGNOSIS — M47.816 LUMBAR SPONDYLOSIS: Primary | ICD-10-CM

## 2022-05-31 PROCEDURE — G8417 CALC BMI ABV UP PARAM F/U: HCPCS | Performed by: NURSE PRACTITIONER

## 2022-05-31 PROCEDURE — 4004F PT TOBACCO SCREEN RCVD TLK: CPT | Performed by: NURSE PRACTITIONER

## 2022-05-31 PROCEDURE — G8427 DOCREV CUR MEDS BY ELIG CLIN: HCPCS | Performed by: NURSE PRACTITIONER

## 2022-05-31 PROCEDURE — 99214 OFFICE O/P EST MOD 30 MIN: CPT | Performed by: NURSE PRACTITIONER

## 2022-05-31 RX ORDER — TRAMADOL HYDROCHLORIDE 50 MG/1
50 TABLET ORAL 2 TIMES DAILY PRN
Qty: 60 TABLET | Refills: 0 | Status: SHIPPED | OUTPATIENT
Start: 2022-05-31 | End: 2022-06-07

## 2022-05-31 NOTE — TELEPHONE ENCOUNTER
Patient was seen in the office today for an appointment. Vinicius Holt ordered a UDS for patient to complete before leaving her appointment. Patient left without doing the UDS. I called and left patient a message that we needed her to come back to the office to do a UDS. I called the pharmacy and cancelled Tramadol rx.

## 2022-06-07 ENCOUNTER — TELEPHONE (OUTPATIENT)
Dept: PHYSICAL MEDICINE AND REHAB | Age: 37
End: 2022-06-07

## 2022-06-07 NOTE — TELEPHONE ENCOUNTER
Records reviewed from [address] [place] [address]. Please call patient to set up a lumbar epidural steroid injection at L4-5 with a follow-up appointment in the office 6 weeks after the injection. Anticoagulation/NPO Recommendations:   Patient does not need to hold any medications prior to the procedure. Patient does not need to be NPO prior to the procedure. We will do a LOCAL injection  Patient will need a     Thank you!   Mitra Nieves, LIBAN - CNP

## 2022-06-23 ENCOUNTER — PREP FOR PROCEDURE (OUTPATIENT)
Dept: PHYSICAL MEDICINE AND REHAB | Age: 37
End: 2022-06-23

## 2022-06-26 NOTE — H&P
Today, patient presents for planned lumbar epidural steroid injection. This note is reflective of the patient's previous visit for evaluation. We will proceed with today's planned procedure. Since patient's last visit for evaluation, there have been no interval changes in medical history. Patient has no new numbness, weakness, or focal neurological deficit since evaluation. Patient has no contraindications to injection (no anticoagulation or recent antibiotic intake for active infections), and has a  present or is able to drive themselves (as discussed and cleared by physician). Allergies to latex, contrast dye, and steroid medications have been confirmed with the patient prior to the procedure. NPO necessity has been assessed and accepted based on procedure complexity. The risks and benefits of the procedure have been explained including but are not limited to infection, bleeding, paralysis, immediate post procedure weakness, and dizziness; the patient acknowledges understanding and desires to proceed with the procedure. Patient has signed consent for same procedure as discussed in previous clinic encounter. All other questions and concerns were addressed at bedside. See procedure note for full details. Post procedure Instructions: The patient was advised not to drive during the day of the procedure and not to engage in any significant decision making (unless otherwise states by physician). The patient was also advised to be cautious with walking/activity for 24 hours following today's visit and asked not to engage in over-exertion (unless otherwise states by physician). After this time, it is ok to resume pre-procedure level of activity. Patient advised to apply ice to site of injection in situations of pain and discomfort. Patient advised to not submerge site of injection during bath or pool activities for approximately 24 hours post-procedure.  Patient attested to understanding post procedure directions / restrictions. All other questions and concerns addressed before patient discharge in ambulatory fashion. Chronic Pain/PM&R Clinic Note     Encounter Date: 5/31/22    Subjective:   Chief Complaint:   No chief complaint on file. History of Present Illness:   Noa Stearns is a 40 y.o. female seen in the clinic initially on 04/11/2022 upon request from [place], [place]  for her history of low back pain. States she has had low back pain for several years but it has been getting significantly worse in the last year, especially since October 2021. Patient denies any prior events that may have caused aggravation of her pain. Patient states she tries to stay active but has been very difficult due to her pain level. She states she hears grinding in her right low back. She states pain is mainly located right low back radiating down her right leg to her right knee. She will occasionally get pain that shoots down to her right foot, this is dependent on how active she is. She has recently noticed some pain in her left low back radiating into her left leg. Patient states her pain is worse with increased activity and better at rest.  Patient states she uses a cane to help take the pressure off her back. When she is out shopping she has to use a cart to help take the pressure off her back. Patient currently walks with a cane outside of the home. She states she did fall last week when crouching down under a desk and lost her balance. She does state she feels like she is off balance frequently. Patient states she has trouble sleeping because she cannot lay in 1 position for very long without having pain. Patient states she will sometimes get pressure in her right hip and it feels like it is swollen. Patient states she did physical therapy a few years back which was ineffective and almost made things worse. Patient denies any bowel/bladder incontinence or saddle anesthesia.   Patient currently moved back here from [place] within the last few months and she has yet to get established with a family doctor. She currently lives at home with her 3 kids ranging from ages 14-13. She is currently working online to get her masters degree. 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 She currently has a ventriculoperitoneal shunt, but it is shut off. She also has a history of Chiari malformation and decompression. She was previously seeing a specialist in [place] but has not been there since 2019. Today, 5/31/2022, patient presents for planned follow-up on low back pain. Patient underwent a bilateral SI joint injection on 5/3/2022. She states she did not notice a huge amount of relief with this procedure. Although, she did state she has a lot less pressure in her right hip and she is able to sit for a longer period of time. She has noticed an increase in her midline low back pain since the procedure. Patient is questioning if we can repeat the procedure she had done in 2018, ordered by Dr. Tracee Monae. She states this injection helped significantly with her low back and right leg pain. Patient states she stopped taking the meloxicam due to upset stomach and it also was ineffective. Patient denies any new symptoms such as focal leg weakness, new leg paresthesias, saddle anesthesia, bowel/bladder incontinence. History of Interventions:   Surgery: No previous lumbar surgeries  Injections:   Injection in office in 2018 for sciatica (Ordered by Dr. Tracee Monae)   B/L SI joint injection (05/03/2022) - relief in SI pain (increased midline low back pain)    Current Treatment Medications:   Ibuprofen - relatively ineffective   Tylenol - ineffecitve     Historical Treatment Medications:   Flexeril - ineffective  Baclofen - ineffective   Gabapentin - helps a little.  100mg TID   Lyrica - s/e  Cymbalta - fibromyalgia, withdrawal was terrible  Amitriptyline - depression - bipolar - not recently. Zanaflex - ineffective   Mobic - some relief   Norco  Mobic - upset stomach, ineffective    Imaging:  CT Lumbar (01/20/2022)  Narrative   PROCEDURE: CT LUMBAR SPINE W CONTRAST       CLINICAL INFORMATION: DDD (degenerative disc disease), lumbosacral, Bilateral stenosis of lateral recess of lumbar spine, Lumbar radiculopathy.       COMPARISON: Intra-articular administration of contrast performed on the same day. Plain radiographs dated 21 March 2010. .       TECHNIQUE: 3 mm axial images were obtained of the lumbar spine following intrathecal administration of 18 mL of Omnipaque 180. Sagittal and coronal reconstructions were obtained. Angled images were reconstructed through the L3-4, L4-5 and L5-S1 disc    levels.       All CT scans at this facility use dose modulation, iterative reconstruction, and/or weight-based dosing when appropriate to reduce radiation dose to as low as reasonably achievable.       FINDINGS:               The lumbar vertebral bodies are normally aligned.  There are no compression fractures.  No pars defects are noted. There appears be an intraspinal catheter entering the spine at L4-5       There are no gross abnormalities within the spinal canal.       On the axial images, at T11-T12, T12-L1 and L1-L2, there is no significant disc herniation, canal or foraminal stenosis.       At L2-3, there is a 2.6 mm disc protrusion. This results in mild-to-moderate canal and bilateral foraminal stenosis.       At L3-4, there is no disc herniation, canal or foraminal stenosis.       At L4-5, there is a 4 mm disc protrusion and facet hypertrophy this results in moderate severe canal and bilateral foraminal stenosis.       At L5-S1, there is a 1.8 mm disc protrusion and facet hypertrophy.  This results in mild canal and moderate bilateral foraminal stenosis.       There is mild degenerative change involving the sacroiliac joints bilaterally       There is a 16 mm sclerotic lesion. In the left iliac crest.       There is a possible intraperitoneal catheter present.               Impression       1. Degenerative changes most marked at L4-5, at which level there is moderate to severe canal and bilateral foraminal stenosis. There appears to be an intraspinal catheter entering the spine at L4-5.   2. There is mild canal and moderate bilateral foraminal stenosis at L5-S1. .   3. There is mild-to-moderate canal and bilateral foraminal stenosis at L2-3.   4. There is degenerative change involving the sacroiliac joints bilaterally. 5. There is a 16 mm sclerotic lesion involving the left iliac crest.   6. There is a possible intraperitoneal catheter present. .                   **This report has been created using voice recognition software. It may contain minor errors which are inherent in voice recognition technology. **       Final report electronically signed by DR Molly Mccauley on 1/20/2022 11:55 AM         Past Medical History:   Diagnosis Date    ADHD (attention deficit hyperactivity disorder)     Bipolar disorder ([facility] [address]     Chiari malformation     Depression     Fibromyalgia     Pseudotumor cerebri        Past Surgical History:   Procedure Laterality Date    BACK INJECTION Bilateral 5/3/2022    bilateral sacroiliac joint injection.  performed by Scarlett De La Rosa DO at [address]  2007    OTHER SURGICAL HISTORY  2007    lumbar shunt/ shunt    OTHER SURGICAL HISTORY      chiari decompression    VENTRICULOPERITONEAL SHUNT         Family History   Problem Relation Age of Onset    Hypertension Mother     Alzheimer's Disease Father     Stroke Father     Heart Disease Father          Medications & Allergies:   No current outpatient medications    No Known Allergies    Review of Systems:   Constitutional: negative for weight changes or fevers  Genitourinary: negative for bowel/bladder incontinence   Musculoskeletal: positive for low back pain, right leg pain  Neurological: negative for any leg weakness or numbness/tingling  Behavioral/Psych: negative for anxiety/depression   All other systems reviewed and are negative    Objective: There were no vitals filed for this visit. Constitutional: Pleasant, no acute distress   Head: Normocephalic, atraumatic   Eyes: Conjunctivae normal   Neck: Supple, symmetrical   Lungs: Normal respiratory effort, non-labored breathing   Cardiovascular: Limbs warm and well perfused   Abdomen: Non-protruded   Musculoskeletal: Muscle bulk symmetric, no atrophy, no gross deformities   · Lower Extremities: ROM WNL. · Thorax: No paraspinal tenderness bilaterally. No scoliosis or kyphosis. · Lumbar Spine: ROM WNL. Lumbar paraspinals mildly tender to palpation bilaterally. SLR neg bilaterally. ROSSY positive bilaterally. GAENSLEN positive bilaterally. Positive SI joint distraction bilaterally. Positive facet loading bilaterally. Bilateral SI joints non-tender to palpation. Bilateral greater trochanters non-tender to palpation. Neurological: Cranial nerves II-XII grossly intact. · Gait - Antalgic gait. Ambulates with assistive device (cane). · Motor: 3/5 muscle strength in bilateral hip flexion, knee flexion, knee extension, ankle dorsiflexion, and ankle plantar flexion (pain limited)  · Sensory: LT sensation intact in lower limbs, tingling through bilateral legs   · Reflexes: 2+ symmetrical in bilateral achilles, 2+ bilateral patellar, negative ankle clonus, downgoing babinski   Skin: No rashes or lesions present   Psychological: Cooperative, no exaggerated pain behaviors     Assessment:    Diagnosis Orders   1. Lumbar spondylosis  traMADol (ULTRAM) 50 MG tablet   2. Facet arthropathy     3. Sacroiliac joint dysfunction of both sides     4. Neuropathic pain     5. Chronic pain syndrome     6. Radiculopathy, lumbosacral region         Anjelica Lang is a 40 y. o.female presenting to the pain clinic for evaluation of low back and right leg pain. Patient's history and physical consistent with bilateral sacroiliac joint dysfunction. I have set her up for a bilateral SI joint injection with Dr. Anthony Spicer. We discussed that she likely has several pain generators including lumbar radiculopathy and lumbar facet mediated pain. We discussed the potential of doing an SI ablation if she fails the SI injection or only receives a short amount of relief. We also briefly discussed the possibility of doing lumbar epidurals vs. facet injections in the future. Patient seems to have had a significant response from the bilateral SI joint injection she is now noticing an increase in her midline low back pain. We discussed the potential of doing a lumbar epidural steroid injection as she states this has helped her axial back pain and right leg pain in the past.  She had a lumbar epidural steroid injection at L4-5 with [facility] in the past.  I have set her up for a lumbar epidural steroid injection at L4-5 with Dr. Toyin Allen. We also discussed the potential of pursuing lumbar facet medial branch blocks to RFA for her axial low back pain she has a failed response to the epidural.  I have stopped her Mobic and discussed starting her on on tramadol 50 mg twice daily as needed severe pain (>7/10). Medication will be used to get her through interventional procedures for reduction of pain as she has failed several other medications such as muscle relaxers, NSAIDs, and neuropathic agents. Notified a urine drug screen will be collected today. At that time patient admitted to [facility] use but denied other illegal drug use or alcohol use. Before staff was able to collect a urine drug screen the patient left the office without being discharged. Jessie Burnett MA tried to contact the patient and left a voicemail for the patient to return her call or come back to the office. Keara Diaz called the pharmacy to cancel the Tramadol prescription.  I will not be prescribing further medications. Patient will be procedures only if she continues to see me. Plan: The following treatment recommendations and plan were discussed in detail with Melody Elder. Imaging:   I have reviewed patients imaging of lumbar CT and results were discussed with patient today. Analgesics:   Patient is taking Acetaminophen. Patient informed that the maximum amount of acetaminophen taken on a regular basis should only be 4000 mg per day. I have stopped her Mobic due to s/e. Adjuvants:   None     Interventions: In presence of lumbosacral radiating pain, the option of lumbar epidural steroid injection approach at L4-5 was chosen. The risks and benefits were discussed in detail with the patient. Patient wants to proceed with the injection. Anticoagulation/NPO Recommendations:   Patient does not need to hold any medications prior to the procedure. Patient does not need to be NPO prior to the procedure. We will do a LOCAL injection    Multidisciplinary Pain Management:   In the presence of complex, chronic, and multi-factorial pain, the importance of a multidisciplinary approach to pain management in the patients management regimen was emphasized and discussed in great detail. PHYSICAL THERAPY: Patient is advised to see a physical therapist for gentle stretching exercises and conditioning exercises for management of pain. PSYCHOLOGY: Patient is advised to see a clinical pain psychologist, for the psychosocial aspect of pain care through coping skills, relaxation strategies, cognitive group therapy etc.     Referrals:  None    Prescriptions Written This Visit:   Tramadol sent then cancelled.      Follow-up: JUVENTINO L4-5, follow up 6 weeks after injection     Marlon Leyva DO

## 2022-06-28 ENCOUNTER — HOSPITAL ENCOUNTER (OUTPATIENT)
Age: 37
Setting detail: OUTPATIENT SURGERY
Discharge: HOME | End: 2022-06-28
Attending: ANESTHESIOLOGY | Admitting: ANESTHESIOLOGY
Payer: MEDICARE

## 2022-06-28 ENCOUNTER — APPOINTMENT (OUTPATIENT)
Dept: GENERAL RADIOLOGY | Age: 37
End: 2022-06-28
Attending: ANESTHESIOLOGY
Payer: MEDICARE

## 2022-06-28 VITALS
RESPIRATION RATE: 20 BRPM | OXYGEN SATURATION: 99 % | HEART RATE: 63 BPM | TEMPERATURE: 98.1 F | SYSTOLIC BLOOD PRESSURE: 106 MMHG | HEIGHT: 64 IN | BODY MASS INDEX: 41.48 KG/M2 | DIASTOLIC BLOOD PRESSURE: 59 MMHG | WEIGHT: 243 LBS

## 2022-06-28 PROCEDURE — 2709999900 HC NON-CHARGEABLE SUPPLY: Performed by: ANESTHESIOLOGY

## 2022-06-28 PROCEDURE — 62323 NJX INTERLAMINAR LMBR/SAC: CPT | Performed by: ANESTHESIOLOGY

## 2022-06-28 PROCEDURE — 3209999900 FLUORO FOR SURGICAL PROCEDURES

## 2022-06-28 PROCEDURE — 2500000003 HC RX 250 WO HCPCS: Performed by: ANESTHESIOLOGY

## 2022-06-28 PROCEDURE — 7100000010 HC PHASE II RECOVERY - FIRST 15 MIN: Performed by: ANESTHESIOLOGY

## 2022-06-28 PROCEDURE — 6360000002 HC RX W HCPCS: Performed by: ANESTHESIOLOGY

## 2022-06-28 PROCEDURE — 3600000054 HC PAIN LEVEL 3 BASE: Performed by: ANESTHESIOLOGY

## 2022-06-28 PROCEDURE — 3600000055 HC PAIN LEVEL 3 ADDL 15 MIN: Performed by: ANESTHESIOLOGY

## 2022-06-28 PROCEDURE — 6360000004 HC RX CONTRAST MEDICATION: Performed by: ANESTHESIOLOGY

## 2022-06-28 RX ORDER — DEXAMETHASONE SODIUM PHOSPHATE 4 MG/ML
INJECTION, SOLUTION INTRA-ARTICULAR; INTRALESIONAL; INTRAMUSCULAR; INTRAVENOUS; SOFT TISSUE PRN
Status: DISCONTINUED | OUTPATIENT
Start: 2022-06-28 | End: 2022-06-28 | Stop reason: ALTCHOICE

## 2022-06-28 RX ORDER — LIDOCAINE HYDROCHLORIDE 10 MG/ML
INJECTION, SOLUTION EPIDURAL; INFILTRATION; INTRACAUDAL; PERINEURAL PRN
Status: DISCONTINUED | OUTPATIENT
Start: 2022-06-28 | End: 2022-06-28 | Stop reason: ALTCHOICE

## 2022-06-28 ASSESSMENT — PAIN SCALES - GENERAL: PAINLEVEL_OUTOF10: 0

## 2022-06-28 ASSESSMENT — PAIN - FUNCTIONAL ASSESSMENT: PAIN_FUNCTIONAL_ASSESSMENT: 0-10

## 2022-06-28 NOTE — PROCEDURES
Pre-operative Diagnosis: Radicular leg pain     Post-operative Diagnosis: Radicular leg pain     Procedure: Lumbar epidural steroid injection    Procedure Description:  After having obtained a signed informed consent, the patient was taken to the fluoroscopy suite and placed in the prone position. The patient's back was prepped with chloraprep and draped in a sterile fashion. A total of 1.5 cc of 1 % lidocaine was used to anesthetize the skin and underlying tissues. Under fluoroscopic guidance, a single 20G Tuohy needle was advanced using midline approach at the L4/L5 interspace until gaining the epidural space using the loss of resistance to saline syringe technique. There were no paresthesias, heme, or CSF aspiration. A total of 0.25 cc of Omnipaque 300 were injected having had adequate dye spread within the epidural space. Needle placement and contrast spread was confirmed using the AP and contralateral views. 10 mg of Dexamethasone with 1 cc of saline solution were injected in the epidural space. The needle was flushed and removed without any complication. The patient tolerated the procedure well and was transported to the recovery room. The patient was observed for 15 minutes to then discharged in an ambulatory fashion.     Procedural Complications: None  Estimated Blood Loss: 0 mL         Marlon Mccarty DO  Interventional Pain Management/PM&R   . [facility] [employer]

## 2022-06-28 NOTE — PROGRESS NOTES
0809  Pt. Awake and oriented on adm. Too phase II. Pt. Denies pain. Pt. States she has some discomfort in right leg as she did preop. 4613  Pt. Up and getting dressed without difficulty. 0820  Phase II criteria met. Pt. Discharged per ambulation with nurse. Pt. Denies complaints. Personal belongings and discharge instructions with pt.

## 2022-08-01 ENCOUNTER — OFFICE VISIT (OUTPATIENT)
Dept: PAIN MANAGEMENT | Age: 37
End: 2022-08-01
Payer: MEDICARE

## 2022-08-01 VITALS
BODY MASS INDEX: 41.48 KG/M2 | DIASTOLIC BLOOD PRESSURE: 74 MMHG | WEIGHT: 243 LBS | SYSTOLIC BLOOD PRESSURE: 108 MMHG | HEIGHT: 64 IN

## 2022-08-01 DIAGNOSIS — M79.18 MYOFASCIAL PAIN: ICD-10-CM

## 2022-08-01 DIAGNOSIS — M51.36 DEGENERATIVE DISC DISEASE, LUMBAR: ICD-10-CM

## 2022-08-01 DIAGNOSIS — M53.3 CHRONIC SI JOINT PAIN: Primary | ICD-10-CM

## 2022-08-01 DIAGNOSIS — G89.29 CHRONIC SI JOINT PAIN: Primary | ICD-10-CM

## 2022-08-01 DIAGNOSIS — G89.29 OTHER CHRONIC PAIN: ICD-10-CM

## 2022-08-01 PROCEDURE — 99215 OFFICE O/P EST HI 40 MIN: CPT | Performed by: ANESTHESIOLOGY

## 2022-08-01 PROCEDURE — G8417 CALC BMI ABV UP PARAM F/U: HCPCS | Performed by: ANESTHESIOLOGY

## 2022-08-01 PROCEDURE — G8427 DOCREV CUR MEDS BY ELIG CLIN: HCPCS | Performed by: ANESTHESIOLOGY

## 2022-08-01 PROCEDURE — 4004F PT TOBACCO SCREEN RCVD TLK: CPT | Performed by: ANESTHESIOLOGY

## 2022-08-01 RX ORDER — PSEUDOEPHEDRINE HYDROCHLORIDE 30 MG/1
30 TABLET ORAL EVERY 4 HOURS PRN
COMMUNITY

## 2022-08-01 NOTE — PROGRESS NOTES
yet to get established with a family doctor. She currently lives at home with her 3 kids ranging from ages 14-13. She is currently working online to get her masters degree. 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 She currently has a ventriculoperitoneal shunt, but it is shut off. She also has a history of Chiari malformation and decompression. She was previously seeing a specialist in [facility] [employer] [facility] but has not been there since 2019. Today, 8/1/2022, patient presents for planned follow-up for chronic low back pain. She underwent her lumbar epidural steroid injection on 6/28/2022. She reports greater than 75% relief in her low back pain from this procedure. She states that she is very happy with the results of this but has noticed that her lower buttocks pain in her SI joints have gotten worse since her last visit. She is wonder if she can repeat her SI joint injections as she obtained significant relief from this. She denies any other new symptoms of worsening radicular leg pain, new focal leg weakness, leg numbness/tingling, or bowel/bladder incontinence. History of Interventions:   Surgery: No previous lumbar surgeries  Injections:   Injection in office in 2018 for sciatica (Ordered by Dr. David Perkins)   B/L SI joint injection (05/03/2022) - relief in SI pain (increased midline low back pain)  LESI (6/28/22) - >75% relief     Current Treatment Medications:   Ibuprofen - relatively ineffective   Tylenol - ineffecitve     Historical Treatment Medications:   Flexeril - ineffective  Baclofen - ineffective   Gabapentin - helps a little. 100mg TID   Lyrica - s/e  Cymbalta - fibromyalgia, withdrawal was terrible  Amitriptyline - depression - bipolar - not recently.    Zanaflex - ineffective   Mobic - some relief   Norco  Mobic - upset stomach, ineffective    Imaging:  CT Lumbar (01/20/2022)  Narrative   PROCEDURE: CT LUMBAR SPINE W CONTRAST       CLINICAL INFORMATION: DDD (degenerative disc disease), lumbosacral, Bilateral stenosis of lateral recess of lumbar spine, Lumbar radiculopathy. COMPARISON: Intra-articular administration of contrast performed on the same day. Plain radiographs dated 21 March 2010. Sharon Crumble TECHNIQUE: 3 mm axial images were obtained of the lumbar spine following intrathecal administration of 18 mL of Omnipaque 180. Sagittal and coronal reconstructions were obtained. Angled images were reconstructed through the L3-4, L4-5 and L5-S1 disc    levels. All CT scans at this facility use dose modulation, iterative reconstruction, and/or weight-based dosing when appropriate to reduce radiation dose to as low as reasonably achievable. FINDINGS:               The lumbar vertebral bodies are normally aligned. There are no compression fractures. No pars defects are noted. There appears be an intraspinal catheter entering the spine at L4-5       There are no gross abnormalities within the spinal canal.       On the axial images, at T11-T12, T12-L1 and L1-L2, there is no significant disc herniation, canal or foraminal stenosis. At L2-3, there is a 2.6 mm disc protrusion. This results in mild-to-moderate canal and bilateral foraminal stenosis. At L3-4, there is no disc herniation, canal or foraminal stenosis. At L4-5, there is a 4 mm disc protrusion and facet hypertrophy this results in moderate severe canal and bilateral foraminal stenosis. At L5-S1, there is a 1.8 mm disc protrusion and facet hypertrophy. This results in mild canal and moderate bilateral foraminal stenosis. There is mild degenerative change involving the sacroiliac joints bilaterally       There is a 16 mm sclerotic lesion. In the left iliac crest.       There is a possible intraperitoneal catheter present. Impression       1.  Degenerative changes most marked at L4-5, at which level there is moderate to severe canal and bilateral foraminal stenosis. There appears to be an intraspinal catheter entering the spine at L4-5.   2. There is mild canal and moderate bilateral foraminal stenosis at L5-S1. .   3. There is mild-to-moderate canal and bilateral foraminal stenosis at L2-3.   4. There is degenerative change involving the sacroiliac joints bilaterally. 5. There is a 16 mm sclerotic lesion involving the left iliac crest.   6. There is a possible intraperitoneal catheter present. .                   **This report has been created using voice recognition software. It may contain minor errors which are inherent in voice recognition technology. **       Final report electronically signed by DR Kaden Saldana on 1/20/2022 11:55 AM         Past Medical History:   Diagnosis Date    ADHD (attention deficit hyperactivity disorder)     Bipolar disorder ([facility] [address]     Chiari malformation     Depression     Fibromyalgia     Pseudotumor cerebri        Past Surgical History:   Procedure Laterality Date    BACK INJECTION Bilateral 5/3/2022    bilateral sacroiliac joint injection.  performed by Norm Gilbert DO at [facility]  2007    OTHER SURGICAL HISTORY  2007    lumbar shunt/ shunt    OTHER SURGICAL HISTORY      chiari decompression    PAIN MANAGEMENT PROCEDURE N/A 6/28/2022    lumbar epidural steroid injection Lumbar 4-5 performed by Norm Gilbert DO at [facility]         Family History   Problem Relation Age of Onset    Hypertension Mother     Alzheimer's Disease Father     Stroke Father     Heart Disease Father          Medications & Allergies:   Current Outpatient Medications   Medication Instructions    Loratadine (CLARITIN PO) Oral    pseudoephedrine (SUDAFED) 30 mg, Oral, EVERY 4 HOURS PRN       No Known Allergies    Review of Systems:   Constitutional: negative for weight changes or fevers  Genitourinary: negative for bowel/bladder incontinence   Musculoskeletal: positive for low back pain, right leg pain  Neurological: negative for any leg weakness or numbness/tingling  Behavioral/Psych: negative for anxiety/depression   All other systems reviewed and are negative    Objective:     Vitals:    08/01/22 1000   BP: 108/74       Constitutional: Pleasant, no acute distress   Head: Normocephalic, atraumatic   Eyes: Conjunctivae normal   Neck: Supple, symmetrical   Lungs: Normal respiratory effort, non-labored breathing   Cardiovascular: Limbs warm and well perfused   Abdomen: Non-protruded   Musculoskeletal: Muscle bulk symmetric, no atrophy, no gross deformities   · Lower Extremities: ROM WNL. · Thorax: No paraspinal tenderness bilaterally. No scoliosis or kyphosis. · Lumbar Spine: ROM WNL. Lumbar paraspinals mildly tender to palpation bilaterally. SLR neg bilaterally. ROSSY positive bilaterally. GAENSLEN positive bilaterally. Positive SI joint distraction bilaterally. Positive facet loading bilaterally. Bilateral SI joints non-tender to palpation. Bilateral greater trochanters non-tender to palpation. Neurological: Cranial nerves II-XII grossly intact. · Gait - Antalgic gait. Ambulates with assistive device (cane). · Motor: No focal motor deficits in lower limbs  Skin: No rashes or lesions present   Psychological: Cooperative, no exaggerated pain behaviors     Assessment:    Diagnosis Orders   1. Chronic SI joint pain  CHG FLUOR NEEDLE/CATH SPINE/PARASPINAL DX/THER ADDON    DE INJECT SI JOINT ARTHRGRPHY&/ANES/STEROID W/IMAGE      2. Degenerative disc disease, lumbar        3. Other chronic pain        4. Myofascial pain              Teja Chance is a 40 y. o.female presenting to the pain clinic for evaluation of low back and right leg pain. Patient's history and physical consistent with bilateral sacroiliac joint dysfunction. I have set her up for a bilateral SI joint injection with Dr. Nathen Olszewski.   We discussed that she likely has several pain generators including lumbar radiculopathy and lumbar facet mediated pain. We discussed the potential of doing an SI ablation if she fails the SI injection or only receives a short amount of relief. We also briefly discussed the possibility of doing lumbar epidurals vs. facet injections in the future. Patient seems to have had a significant response from the bilateral SI joint injection she is now noticing an increase in her midline low back pain. We discussed the potential of doing a lumbar epidural steroid injection as she states this has helped her axial back pain and right leg pain in the past.  She had a lumbar epidural steroid injection at L4-5 with [facility] in the past.  I have set her up for a lumbar epidural steroid injection at L4-5 with Dr. Gissel Cannon. We also discussed the potential of pursuing lumbar facet medial branch blocks to RFA for her axial low back pain she has a failed response to the epidural.  I have stopped her Mobic and discussed starting her on on tramadol 50 mg twice daily as needed severe pain (>7/10). Medication will be used to get her through interventional procedures for reduction of pain as she has failed several other medications such as muscle relaxers, NSAIDs, and neuropathic agents. Notified a urine drug screen will be collected today. At that time patient admitted to [facility] use but denied other illegal drug use or alcohol use. Before staff was able to collect a urine drug screen the patient left the office without being discharged. Kenneth Rooney MA tried to contact the patient and left a voicemail for the patient to return her call or come back to the office. Jesse Mackay called the pharmacy to cancel the Tramadol prescription. I will not be prescribing further medications. Patient will be procedures only if she continues to see me. She underwent a successful lumbar epidural steroid injection in June. I set her up for repeat sacroiliac joint injections. Plan:    The following treatment recommendations and plan were discussed in detail with [place]. Imaging:   I have reviewed patients imaging of lumbar CT. Analgesics:   Patient is taking Acetaminophen. Patient informed that the maximum amount of acetaminophen taken on a regular basis should only be 4000 mg per day. I have stopped her Mobic due to s/e. Adjuvants:   None     Interventions: With examination consistent with bilateral sacroiliac dysfunction/pain, we will proceed with a bilateral sacroiliac joint injection. The risks and benefits were discussed in detail with the patient. Patient wants to proceed with the injection. Anticoagulation/NPO Recommendations:   Patient does not need to hold any medications prior to the procedure. Patient will need to be NPO x 8 hours prior to the procedure. We will use IV sedation. Multidisciplinary Pain Management:   In the presence of complex, chronic, and multi-factorial pain, the importance of a multidisciplinary approach to pain management in the patients management regimen was emphasized and discussed in great detail. PHYSICAL THERAPY: Patient is advised to see a physical therapist for gentle stretching exercises and conditioning exercises for management of pain. PSYCHOLOGY: Patient is advised to see a clinical pain psychologist, for the psychosocial aspect of pain care through coping skills, relaxation strategies, cognitive group therapy etc.     Referrals:  None    Prescriptions Written This Visit:   None    Follow-up: B/L SI inj      I spent 40 minutes with the patient and greater than 50% of this time was spent establishing care (patient new to me), discussing her clinical diagnosis of SI joint dysfunction/pain, and discussing SI joint injections and SI joint radiofrequency ablations and have the risks involved.       Carol Come, DO  Interventional Pain Management/PM&R   [place]

## 2022-08-02 ENCOUNTER — TELEPHONE (OUTPATIENT)
Dept: PHYSICAL MEDICINE AND REHAB | Age: 37
End: 2022-08-02

## 2022-08-16 ENCOUNTER — PREP FOR PROCEDURE (OUTPATIENT)
Dept: PHYSICAL MEDICINE AND REHAB | Age: 37
End: 2022-08-16

## 2022-08-16 NOTE — H&P
Today, patient presents for planned bilateral sacroiliac joint injection. This note is reflective of the patient's previous visit for evaluation. We will proceed with today's planned procedure. Since patient's last visit for evaluation, there have been no interval changes in medical history. Patient has no new numbness, weakness, or focal neurological deficit since evaluation. Patient has no contraindications to injection (no anticoagulation or recent antibiotic intake for active infections), and has a  present or is able to drive themselves (as discussed and cleared by physician). Allergies to latex, contrast dye, and steroid medications have been confirmed with the patient prior to the procedure. NPO necessity has been assessed and accepted based on procedure complexity. The risks and benefits of the procedure have been explained including but are not limited to infection, bleeding, paralysis, immediate post procedure weakness, and dizziness; the patient acknowledges understanding and desires to proceed with the procedure. Patient has signed consent for same procedure as discussed in previous clinic encounter. All other questions and concerns were addressed at bedside. See procedure note for full details. Post procedure Instructions: The patient was advised not to drive during the day of the procedure and not to engage in any significant decision making (unless otherwise states by physician). The patient was also advised to be cautious with walking/activity for 24 hours following today's visit and asked not to engage in over-exertion (unless otherwise states by physician). After this time, it is ok to resume pre-procedure level of activity. Patient advised to apply ice to site of injection in situations of pain and discomfort. Patient advised to not submerge site of injection during bath or pool activities for approximately 24 hours post-procedure.  Patient attested to understanding post procedure directions / restrictions. All other questions and concerns addressed before patient discharge in ambulatory fashion. Chronic Pain/PM&R Clinic Note     Encounter Date: 8/1/22    Subjective:   Chief Complaint:   No chief complaint on file. History of Present Illness:   Jorgito Garcia is a 40 y.o. female seen in the clinic initially on 04/11/2022 upon request from [place], [place]  for her history of low back pain. States she has had low back pain for several years but it has been getting significantly worse in the last year, especially since October 2021. Patient denies any prior events that may have caused aggravation of her pain. Patient states she tries to stay active but has been very difficult due to her pain level. She states she hears grinding in her right low back. She states pain is mainly located right low back radiating down her right leg to her right knee. She will occasionally get pain that shoots down to her right foot, this is dependent on how active she is. She has recently noticed some pain in her left low back radiating into her left leg. Patient states her pain is worse with increased activity and better at rest.  Patient states she uses a cane to help take the pressure off her back. When she is out shopping she has to use a cart to help take the pressure off her back. Patient currently walks with a cane outside of the home. She states she did fall last week when crouching down under a desk and lost her balance. She does state she feels like she is off balance frequently. Patient states she has trouble sleeping because she cannot lay in 1 position for very long without having pain. Patient states she will sometimes get pressure in her right hip and it feels like it is swollen. Patient states she did physical therapy a few years back which was ineffective and almost made things worse. Patient denies any bowel/bladder incontinence or saddle anesthesia.   Patient currently moved back here from [place] within the last few months and she has yet to get established with a family doctor. She currently lives at home with her 3 kids ranging from ages 14-13. She is currently working online to get her masters degree. 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 She currently has a ventriculoperitoneal shunt, but it is shut off. She also has a history of Chiari malformation and decompression. She was previously seeing a specialist in [place] but has not been there since 2019. Today, 8/1/2022, patient presents for planned follow-up for chronic low back pain. She underwent her lumbar epidural steroid injection on 6/28/2022. She reports greater than 75% relief in her low back pain from this procedure. She states that she is very happy with the results of this but has noticed that her lower buttocks pain in her SI joints have gotten worse since her last visit. She is wonder if she can repeat her SI joint injections as she obtained significant relief from this. She denies any other new symptoms of worsening radicular leg pain, new focal leg weakness, leg numbness/tingling, or bowel/bladder incontinence. History of Interventions:   Surgery: No previous lumbar surgeries  Injections:   Injection in office in 2018 for sciatica (Ordered by Dr. Chip Rosales)   B/L SI joint injection (05/03/2022) - relief in SI pain (increased midline low back pain)  LESI (6/28/22) - >75% relief     Current Treatment Medications:   Ibuprofen - relatively ineffective   Tylenol - ineffecitve     Historical Treatment Medications:   Flexeril - ineffective  Baclofen - ineffective   Gabapentin - helps a little. 100mg TID   Lyrica - s/e  Cymbalta - fibromyalgia, withdrawal was terrible  Amitriptyline - depression - bipolar - not recently.    Zanaflex - ineffective   Mobic - some relief   Norco  Mobic - upset stomach, ineffective    Imaging:  CT Lumbar (01/20/2022)  Narrative   PROCEDURE: CT LUMBAR SPINE W CONTRAST       CLINICAL INFORMATION: DDD (degenerative disc disease), lumbosacral, Bilateral stenosis of lateral recess of lumbar spine, Lumbar radiculopathy. COMPARISON: Intra-articular administration of contrast performed on the same day. Plain radiographs dated 21 March 2010. Gunjan Savage TECHNIQUE: 3 mm axial images were obtained of the lumbar spine following intrathecal administration of 18 mL of Omnipaque 180. Sagittal and coronal reconstructions were obtained. Angled images were reconstructed through the L3-4, L4-5 and L5-S1 disc    levels. All CT scans at this facility use dose modulation, iterative reconstruction, and/or weight-based dosing when appropriate to reduce radiation dose to as low as reasonably achievable. FINDINGS:               The lumbar vertebral bodies are normally aligned. There are no compression fractures. No pars defects are noted. There appears be an intraspinal catheter entering the spine at L4-5       There are no gross abnormalities within the spinal canal.       On the axial images, at T11-T12, T12-L1 and L1-L2, there is no significant disc herniation, canal or foraminal stenosis. At L2-3, there is a 2.6 mm disc protrusion. This results in mild-to-moderate canal and bilateral foraminal stenosis. At L3-4, there is no disc herniation, canal or foraminal stenosis. At L4-5, there is a 4 mm disc protrusion and facet hypertrophy this results in moderate severe canal and bilateral foraminal stenosis. At L5-S1, there is a 1.8 mm disc protrusion and facet hypertrophy. This results in mild canal and moderate bilateral foraminal stenosis. There is mild degenerative change involving the sacroiliac joints bilaterally       There is a 16 mm sclerotic lesion. In the left iliac crest.       There is a possible intraperitoneal catheter present. Impression       1.  Degenerative changes most marked at L4-5, at which level there is moderate to severe canal and bilateral foraminal stenosis. There appears to be an intraspinal catheter entering the spine at L4-5.   2. There is mild canal and moderate bilateral foraminal stenosis at L5-S1. .   3. There is mild-to-moderate canal and bilateral foraminal stenosis at L2-3.   4. There is degenerative change involving the sacroiliac joints bilaterally. 5. There is a 16 mm sclerotic lesion involving the left iliac crest.   6. There is a possible intraperitoneal catheter present. .                   **This report has been created using voice recognition software. It may contain minor errors which are inherent in voice recognition technology. **       Final report electronically signed by DR Janas Opitz on 1/20/2022 11:55 AM         Past Medical History:   Diagnosis Date    ADHD (attention deficit hyperactivity disorder)     Bipolar disorder ([facility] [address]     Chiari malformation     Depression     Fibromyalgia     Pseudotumor cerebri        Past Surgical History:   Procedure Laterality Date    BACK INJECTION Bilateral 5/3/2022    bilateral sacroiliac joint injection.  performed by Lou Ray DO at [facility]  2007    OTHER SURGICAL HISTORY  2007    lumbar shunt/ shunt    OTHER SURGICAL HISTORY      chiari decompression    PAIN MANAGEMENT PROCEDURE N/A 6/28/2022    lumbar epidural steroid injection Lumbar 4-5 performed by Lou Ray DO at [address] [place] [address]         Family History   Problem Relation Age of Onset    Hypertension Mother     Alzheimer's Disease Father     Stroke Father     Heart Disease Father          Medications & Allergies:   Current Outpatient Medications   Medication Instructions    Loratadine (CLARITIN PO) Oral    pseudoephedrine (SUDAFED) 30 mg, Oral, EVERY 4 HOURS PRN       No Known Allergies    Review of Systems:   Constitutional: negative for weight changes or fevers  Genitourinary: negative for bowel/bladder incontinence   Musculoskeletal: positive for low back pain, right leg pain  Neurological: negative for any leg weakness or numbness/tingling  Behavioral/Psych: negative for anxiety/depression   All other systems reviewed and are negative    Objective: There were no vitals filed for this visit. Constitutional: Pleasant, no acute distress   Head: Normocephalic, atraumatic   Eyes: Conjunctivae normal   Neck: Supple, symmetrical   Lungs: Normal respiratory effort, non-labored breathing   Cardiovascular: Limbs warm and well perfused   Abdomen: Non-protruded   Musculoskeletal: Muscle bulk symmetric, no atrophy, no gross deformities   · Lower Extremities: ROM WNL. · Thorax: No paraspinal tenderness bilaterally. No scoliosis or kyphosis. · Lumbar Spine: ROM WNL. Lumbar paraspinals mildly tender to palpation bilaterally. SLR neg bilaterally. ROSSY positive bilaterally. GAENSLEN positive bilaterally. Positive SI joint distraction bilaterally. Positive facet loading bilaterally. Bilateral SI joints non-tender to palpation. Bilateral greater trochanters non-tender to palpation. Neurological: Cranial nerves II-XII grossly intact. · Gait - Antalgic gait. Ambulates with assistive device (cane). · Motor: No focal motor deficits in lower limbs  Skin: No rashes or lesions present   Psychological: Cooperative, no exaggerated pain behaviors     Assessment:    Diagnosis Orders   1. Chronic SI joint pain  CHG FLUOR NEEDLE/CATH SPINE/PARASPINAL DX/THER ADDON    MT INJECT SI JOINT ARTHRGRPHY&/ANES/STEROID W/IMAGE      2. Degenerative disc disease, lumbar        3. Other chronic pain        4. Myofascial pain              Pipo Joel is a 40 y. o.female presenting to the pain clinic for evaluation of low back and right leg pain. Patient's history and physical consistent with bilateral sacroiliac joint dysfunction.   I have set her up for a bilateral SI joint injection with Dr. Carol Velazquez. We discussed that she likely has several pain generators including lumbar radiculopathy and lumbar facet mediated pain. We discussed the potential of doing an SI ablation if she fails the SI injection or only receives a short amount of relief. We also briefly discussed the possibility of doing lumbar epidurals vs. facet injections in the future. Patient seems to have had a significant response from the bilateral SI joint injection she is now noticing an increase in her midline low back pain. We discussed the potential of doing a lumbar epidural steroid injection as she states this has helped her axial back pain and right leg pain in the past.  She had a lumbar epidural steroid injection at L4-5 with [facility] in the past.  I have set her up for a lumbar epidural steroid injection at L4-5 with Dr. Colt Christie. We also discussed the potential of pursuing lumbar facet medial branch blocks to RFA for her axial low back pain she has a failed response to the epidural.  I have stopped her Mobic and discussed starting her on on tramadol 50 mg twice daily as needed severe pain (>7/10). Medication will be used to get her through interventional procedures for reduction of pain as she has failed several other medications such as muscle relaxers, NSAIDs, and neuropathic agents. Notified a urine drug screen will be collected today. At that time patient admitted to [facility] use but denied other illegal drug use or alcohol use. Before staff was able to collect a urine drug screen the patient left the office without being discharged. Keily Menjivar MA tried to contact the patient and left a voicemail for the patient to return her call or come back to the office. Halina Fuller called the pharmacy to cancel the Tramadol prescription. I will not be prescribing further medications. Patient will be procedures only if she continues to see me. She underwent a successful lumbar epidural steroid injection in June.   I set her up for repeat sacroiliac joint injections. Plan: The following treatment recommendations and plan were discussed in detail with Gayatri Nation. Imaging:   I have reviewed patients imaging of lumbar CT. Analgesics:   Patient is taking Acetaminophen. Patient informed that the maximum amount of acetaminophen taken on a regular basis should only be 4000 mg per day. I have stopped her Mobic due to s/e. Adjuvants:   None     Interventions: With examination consistent with bilateral sacroiliac dysfunction/pain, we will proceed with a bilateral sacroiliac joint injection. The risks and benefits were discussed in detail with the patient. Patient wants to proceed with the injection. Anticoagulation/NPO Recommendations:   Patient does not need to hold any medications prior to the procedure. Patient will need to be NPO x 8 hours prior to the procedure. We will use IV sedation. Multidisciplinary Pain Management:   In the presence of complex, chronic, and multi-factorial pain, the importance of a multidisciplinary approach to pain management in the patients management regimen was emphasized and discussed in great detail. PHYSICAL THERAPY: Patient is advised to see a physical therapist for gentle stretching exercises and conditioning exercises for management of pain. PSYCHOLOGY: Patient is advised to see a clinical pain psychologist, for the psychosocial aspect of pain care through coping skills, relaxation strategies, cognitive group therapy etc.     Referrals:  None    Prescriptions Written This Visit:   None    Follow-up: B/L SI inj      I spent 40 minutes with the patient and greater than 50% of this time was spent establishing care (patient new to me), discussing her clinical diagnosis of SI joint dysfunction/pain, and discussing SI joint injections and SI joint radiofrequency ablations and have the risks involved.       Marlon Diez, DO  Interventional Pain Management/PM&R [facility]

## 2022-08-17 ENCOUNTER — HOSPITAL ENCOUNTER (OUTPATIENT)
Age: 37
Setting detail: OUTPATIENT SURGERY
Discharge: HOME | End: 2022-08-17
Attending: ANESTHESIOLOGY | Admitting: ANESTHESIOLOGY
Payer: MEDICARE

## 2022-08-17 ENCOUNTER — APPOINTMENT (OUTPATIENT)
Dept: GENERAL RADIOLOGY | Age: 37
End: 2022-08-17
Attending: ANESTHESIOLOGY
Payer: MEDICARE

## 2022-08-17 VITALS
DIASTOLIC BLOOD PRESSURE: 57 MMHG | TEMPERATURE: 97 F | HEIGHT: 64 IN | SYSTOLIC BLOOD PRESSURE: 110 MMHG | OXYGEN SATURATION: 96 % | RESPIRATION RATE: 16 BRPM | HEART RATE: 76 BPM | WEIGHT: 236.6 LBS | BODY MASS INDEX: 40.39 KG/M2

## 2022-08-17 PROCEDURE — 27096 INJECT SACROILIAC JOINT: CPT | Performed by: ANESTHESIOLOGY

## 2022-08-17 PROCEDURE — 3600000054 HC PAIN LEVEL 3 BASE: Performed by: ANESTHESIOLOGY

## 2022-08-17 PROCEDURE — 7100000010 HC PHASE II RECOVERY - FIRST 15 MIN: Performed by: ANESTHESIOLOGY

## 2022-08-17 PROCEDURE — 6360000004 HC RX CONTRAST MEDICATION: Performed by: ANESTHESIOLOGY

## 2022-08-17 PROCEDURE — 2709999900 HC NON-CHARGEABLE SUPPLY: Performed by: ANESTHESIOLOGY

## 2022-08-17 PROCEDURE — 6360000002 HC RX W HCPCS: Performed by: ANESTHESIOLOGY

## 2022-08-17 PROCEDURE — 7100000011 HC PHASE II RECOVERY - ADDTL 15 MIN: Performed by: ANESTHESIOLOGY

## 2022-08-17 PROCEDURE — 99152 MOD SED SAME PHYS/QHP 5/>YRS: CPT | Performed by: ANESTHESIOLOGY

## 2022-08-17 PROCEDURE — 3209999900 FLUORO FOR SURGICAL PROCEDURES

## 2022-08-17 PROCEDURE — 2500000003 HC RX 250 WO HCPCS: Performed by: ANESTHESIOLOGY

## 2022-08-17 RX ORDER — BUPIVACAINE HYDROCHLORIDE 2.5 MG/ML
INJECTION, SOLUTION EPIDURAL; INFILTRATION; INTRACAUDAL PRN
Status: DISCONTINUED | OUTPATIENT
Start: 2022-08-17 | End: 2022-08-17 | Stop reason: ALTCHOICE

## 2022-08-17 RX ORDER — LIDOCAINE HYDROCHLORIDE 10 MG/ML
INJECTION, SOLUTION EPIDURAL; INFILTRATION; INTRACAUDAL; PERINEURAL PRN
Status: DISCONTINUED | OUTPATIENT
Start: 2022-08-17 | End: 2022-08-17 | Stop reason: ALTCHOICE

## 2022-08-17 RX ORDER — FENTANYL CITRATE 50 UG/ML
INJECTION, SOLUTION INTRAMUSCULAR; INTRAVENOUS PRN
Status: DISCONTINUED | OUTPATIENT
Start: 2022-08-17 | End: 2022-08-17 | Stop reason: ALTCHOICE

## 2022-08-17 RX ORDER — MIDAZOLAM HYDROCHLORIDE 1 MG/ML
INJECTION INTRAMUSCULAR; INTRAVENOUS PRN
Status: DISCONTINUED | OUTPATIENT
Start: 2022-08-17 | End: 2022-08-17 | Stop reason: ALTCHOICE

## 2022-08-17 RX ORDER — METHYLPREDNISOLONE ACETATE 80 MG/ML
INJECTION, SUSPENSION INTRA-ARTICULAR; INTRALESIONAL; INTRAMUSCULAR; SOFT TISSUE PRN
Status: DISCONTINUED | OUTPATIENT
Start: 2022-08-17 | End: 2022-08-17 | Stop reason: ALTCHOICE

## 2022-08-17 ASSESSMENT — PAIN - FUNCTIONAL ASSESSMENT: PAIN_FUNCTIONAL_ASSESSMENT: 0-10

## 2022-08-17 NOTE — POST SEDATION
[facility]  Sedation/Analgesia Post Sedation Record    Pt Name: Azam Cespedes  MRN: [id number]  YOB: 1985  Procedure Performed By: Francisco Wolfe DO  Primary Care Physician: No primary care provider on file.     POST-PROCEDURE    Physicians/Assistants: Francisco Wolfe DO  Procedure Performed: See Procedure Note   Sedation/Anesthesia: Versed and Fentanyl (See procedure note for amount and duration)  Estimated Blood Loss:     0  ml  Specimens Removed: None        Complications: None           Marlon Dougherty DO  Electronically signed 8/17/2022 at 2:10 PM

## 2022-08-17 NOTE — PRE SEDATION
[facility]  Pre-Sedation/Analgesia History & Physical    Pt Name: Mateo Aranda  MRN: [id number]  YOB: 1985  Provider Performing Procedure: Norm Gilbert DO   Primary Care Physician: No primary care provider on file. MEDICAL HISTORY       has a past medical history of ADHD (attention deficit hyperactivity disorder), Bipolar disorder ([facility] [address] Chiari malformation, Depression, Fibromyalgia, and Pseudotumor cerebri. SURGICAL HISTORY   has a past surgical history that includes Cholecystectomy (2007); other surgical history (2007); other surgical history; Ventriculoperitoneal shunt; Back Injection (Bilateral, 5/3/2022); and Pain management procedure (N/A, 6/28/2022). ALLERGIES   Allergies as of 08/02/2022    (No Known Allergies)       MEDICATIONS   Prior to Admission medications    Medication Sig Start Date End Date Taking? Authorizing Provider   pseudoephedrine (SUDAFED) 30 MG tablet Take 30 mg by mouth every 4 hours as needed for Congestion    Historical Provider, MD   Loratadine (CLARITIN PO) Take by mouth    Historical Provider, MD     PHYSICAL:   Vitals:    08/17/22 1247   BP: (!) 110/57   Pulse: 76   Resp: 16   Temp: 97 °F (36.1 °C)   SpO2: 96%     PLANNED PROCEDURE   See procedure note  SEDATION  Planned agent: Versed and Fentanyl  ASA Classification: 1  Class 1: A normal healthy patient  Class 2: Pt with mild to moderate systemic disease  Class 3: Severe systemic disease or disturbance  Class 4: Severe systemic disorders that are already life threatening. Class 5: Moribund pt with little chances of survival, for more than 24 hours. Mallampati I Airway Classification: 1    1. Pre-procedure diagnostic studies complete and results available. 2. Previous sedation/anesthesia experiences assessed. 3. The patient is an appropriate candidate to undergo the planned procedure sedation and anesthesia. (Refer to nursing sedation/analgesia documentation record)  4.  Formulation and discussion of sedation/procedure plan, risks, and expectations with patient and/or responsible adult completed. 5. Patient examined immediately prior to the procedure.  (Refer to nursing sedation/analgesia documentation record)    Moe Eubanks DO  Electronically signed 8/17/2022 at 2:10 PM

## 2022-08-17 NOTE — PROGRESS NOTES
1247: Patient arrives to recovery room via cart. Spontaneous respirations, vss, report received from surgical RN. Patient denies pain, numbness, tingling , nausea. Injection site clean, dry, intact. HOB elevated. IV capped. Snack and drink given. Call light within reach. 1300: patient denies needs, getting dressed. 1315: patient ambulated to discharge lobby in stable condition. Patient discharged home with son.

## 2022-08-17 NOTE — PROCEDURES
Pre-operative Diagnosis:  SI joint pain     Post-operative Diagnosis:  SI joint pain     Procedure: Bilateral SI joint injection     Procedure Description:  After having signed the informed consent, the patient was placed in the prone position. The patient's back was prepped with chloraprep solution, and draped in a sterile fashion. A total of 2 ml of 1% lidocaine were used to anesthetize the skin and underlying tissues. Under fluoroscopic guidance a single 22-gauge, 3.5 inch spinal needle was advanced to lie within the inferior pole of the RIGHT sacroiliac joint. There were no paresthesias or heme aspiration. Needle placement was confirmed in the AP view. After negative aspiration, 0.5 ml of Omnipaque 300 contrast was injected with appropriate spread observed. A total of 2 ml of 0.5% bupivicaine mixed with 40 mg depo-medrol were injected into the sacroiliac joint. The needle was withdrawn without any complications. This exact procedure was repeated on the contralateral side. The patient tolerated the procedure well, was transported to the recovery room and observed for 15 minutes and discharged in an ambulatory fashion. No immediate reported complications.     Procedural Complications: None  Estimated Blood Loss: 0 mL      IV sedation was used during the procedure:  - Moderate intravenous conscious sedation was supervised by Dr. Franco Hollingsworth  - The patient was independently monitored by a Registered Nurse assigned to the procedure room  - Monitoring included automated blood pressure, continuous EKG, and continuous pulse oximetry  - The detailed conscious record is permanently stored in the [place] [id number]  - The following is the conscious sedation record:  Start Time: 12:38  End Time : 12:53  Duration: 15 minutes   Medications Administered: 2 mg Versed, 50 mcg Fentanyl      Marlon Mccarty DO  Interventional Pain Management/PM&R   [place]

## 2022-10-11 ENCOUNTER — OFFICE VISIT (OUTPATIENT)
Dept: PHYSICAL MEDICINE AND REHAB | Age: 37
End: 2022-10-11
Payer: MEDICARE

## 2022-10-11 VITALS
HEIGHT: 64 IN | SYSTOLIC BLOOD PRESSURE: 90 MMHG | DIASTOLIC BLOOD PRESSURE: 64 MMHG | WEIGHT: 236 LBS | BODY MASS INDEX: 40.29 KG/M2

## 2022-10-11 DIAGNOSIS — M47.816 LUMBAR SPONDYLOSIS: Primary | ICD-10-CM

## 2022-10-11 DIAGNOSIS — M79.2 NEUROPATHIC PAIN: ICD-10-CM

## 2022-10-11 DIAGNOSIS — M53.3 SACROILIAC JOINT DYSFUNCTION OF BOTH SIDES: ICD-10-CM

## 2022-10-11 DIAGNOSIS — M54.17 RADICULOPATHY, LUMBOSACRAL REGION: ICD-10-CM

## 2022-10-11 DIAGNOSIS — M47.819 FACET ARTHROPATHY: ICD-10-CM

## 2022-10-11 DIAGNOSIS — G89.4 CHRONIC PAIN SYNDROME: ICD-10-CM

## 2022-10-11 PROCEDURE — 99214 OFFICE O/P EST MOD 30 MIN: CPT | Performed by: NURSE PRACTITIONER

## 2022-10-11 PROCEDURE — G8417 CALC BMI ABV UP PARAM F/U: HCPCS | Performed by: NURSE PRACTITIONER

## 2022-10-11 PROCEDURE — G8484 FLU IMMUNIZE NO ADMIN: HCPCS | Performed by: NURSE PRACTITIONER

## 2022-10-11 PROCEDURE — G8427 DOCREV CUR MEDS BY ELIG CLIN: HCPCS | Performed by: NURSE PRACTITIONER

## 2022-10-11 PROCEDURE — 4004F PT TOBACCO SCREEN RCVD TLK: CPT | Performed by: NURSE PRACTITIONER

## 2022-10-11 NOTE — PROGRESS NOTES
Chronic Pain/PM&R Clinic Note     Encounter Date: 10/11/22    Subjective:   Chief Complaint:   Chief Complaint   Patient presents with    Follow Up After Procedure     bilateral sacroiliac joint injection 8/17/22         History of Present Illness:   Leni Whitehead is a 40 y.o. female seen in the clinic initially on 04/11/2022 upon request from [place], [place]  for her history of low back pain. States she has had low back pain for several years but it has been getting significantly worse in the last year, especially since October 2021. Patient denies any prior events that may have caused aggravation of her pain. Patient states she tries to stay active but has been very difficult due to her pain level. She states she hears grinding in her right low back. She states pain is mainly located right low back radiating down her right leg to her right knee. She will occasionally get pain that shoots down to her right foot, this is dependent on how active she is. She has recently noticed some pain in her left low back radiating into her left leg. Patient states her pain is worse with increased activity and better at rest.  Patient states she uses a cane to help take the pressure off her back. When she is out shopping she has to use a cart to help take the pressure off her back. Patient currently walks with a cane outside of the home. She states she did fall last week when crouching down under a desk and lost her balance. She does state she feels like she is off balance frequently. Patient states she has trouble sleeping because she cannot lay in 1 position for very long without having pain. Patient states she will sometimes get pressure in her right hip and it feels like it is swollen. Patient states she did physical therapy a few years back which was ineffective and almost made things worse. Patient denies any bowel/bladder incontinence or saddle anesthesia.   Patient currently moved back here from [place] within the last few months and she has yet to get established with a family doctor. She currently lives at home with her 3 kids ranging from ages 14-13. She is currently working online to get her masters degree. 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 She currently has a ventriculoperitoneal shunt, but it is shut off. She also has a history of Chiari malformation and decompression. She was previously seeing a specialist in [place] but has not been there since 2019. Today, 10/11/2022, patient presents for planned follow-up on chronic low back pain. Patient underwent the bilateral SI joint injection on 8/17/2022. She has noticed significant improvement in SI joint pain. She has noticed increased low back and leg pain and would like to repeat the epidural steroid injection. Patient states she was able to be much more active this summer due to getting relief from her injections, she is very happy about this. She has not notice any new symptoms such as new leg paresthesias, leg weakness, saddle anesthesia, bowel/bladder incontinence. History of Interventions:   Surgery: No previous lumbar surgeries  Injections:   Injection in office in 2018 for sciatica (Ordered by Dr. Ema Love)   B/L SI joint injection (05/03/2022) - relief in SI pain (increased midline low back pain)  LESI L4-5 (06/28/2022) - effective  B/L SI joint injection (08/17/2022) - effective x 3 months    Current Treatment Medications:   Ibuprofen - relatively ineffective   Tylenol - ineffecitve     Historical Treatment Medications:   Flexeril - ineffective  Baclofen - ineffective   Gabapentin - helps a little. 100mg TID   Lyrica - s/e  Cymbalta - fibromyalgia, withdrawal was terrible  Amitriptyline - depression - bipolar - not recently.    Zanaflex - ineffective   Mobic - some relief   Norco  Mobic - upset stomach, ineffective    Imaging:  CT Lumbar (01/20/2022)  Narrative PROCEDURE: CT LUMBAR SPINE W CONTRAST       CLINICAL INFORMATION: DDD (degenerative disc disease), lumbosacral, Bilateral stenosis of lateral recess of lumbar spine, Lumbar radiculopathy. COMPARISON: Intra-articular administration of contrast performed on the same day. Plain radiographs dated 21 March 2010. Pink Mc TECHNIQUE: 3 mm axial images were obtained of the lumbar spine following intrathecal administration of 18 mL of Omnipaque 180. Sagittal and coronal reconstructions were obtained. Angled images were reconstructed through the L3-4, L4-5 and L5-S1 disc    levels. All CT scans at this facility use dose modulation, iterative reconstruction, and/or weight-based dosing when appropriate to reduce radiation dose to as low as reasonably achievable. FINDINGS:               The lumbar vertebral bodies are normally aligned. There are no compression fractures. No pars defects are noted. There appears be an intraspinal catheter entering the spine at L4-5       There are no gross abnormalities within the spinal canal.       On the axial images, at T11-T12, T12-L1 and L1-L2, there is no significant disc herniation, canal or foraminal stenosis. At L2-3, there is a 2.6 mm disc protrusion. This results in mild-to-moderate canal and bilateral foraminal stenosis. At L3-4, there is no disc herniation, canal or foraminal stenosis. At L4-5, there is a 4 mm disc protrusion and facet hypertrophy this results in moderate severe canal and bilateral foraminal stenosis. At L5-S1, there is a 1.8 mm disc protrusion and facet hypertrophy. This results in mild canal and moderate bilateral foraminal stenosis. There is mild degenerative change involving the sacroiliac joints bilaterally       There is a 16 mm sclerotic lesion. In the left iliac crest.       There is a possible intraperitoneal catheter present. Impression       1.  Degenerative changes most marked at L4-5, at which level there is moderate to severe canal and bilateral foraminal stenosis. There appears to be an intraspinal catheter entering the spine at L4-5.   2. There is mild canal and moderate bilateral foraminal stenosis at L5-S1. .   3. There is mild-to-moderate canal and bilateral foraminal stenosis at L2-3.   4. There is degenerative change involving the sacroiliac joints bilaterally. 5. There is a 16 mm sclerotic lesion involving the left iliac crest.   6. There is a possible intraperitoneal catheter present. .                   **This report has been created using voice recognition software. It may contain minor errors which are inherent in voice recognition technology. **       Final report electronically signed by DR Nicole Reyes on 1/20/2022 11:55 AM         Past Medical History:   Diagnosis Date    ADHD (attention deficit hyperactivity disorder)     Bipolar disorder ([facility] [address]     Chiari malformation     Depression     Fibromyalgia     Pseudotumor cerebri        Past Surgical History:   Procedure Laterality Date    BACK INJECTION Bilateral 5/3/2022    bilateral sacroiliac joint injection.  performed by Fidencio Field DO at [facility] Bilateral 8/17/2022    bilateral sacroiliac joint injection performed by Fidencio Field DO at [address] [place] [address]  2007    OTHER SURGICAL HISTORY  2007    lumbar shunt/ shunt    OTHER SURGICAL HISTORY      chiari decompression    PAIN MANAGEMENT PROCEDURE N/A 6/28/2022    lumbar epidural steroid injection Lumbar 4-5 performed by Fidencio Field DO at [address] [place] [address]         Family History   Problem Relation Age of Onset    Hypertension Mother     Alzheimer's Disease Father     Stroke Father     Heart Disease Father          Medications & Allergies:   Current Outpatient Medications   Medication Instructions    Loratadine (CLARITIN PO) Oral    pseudoephedrine (SUDAFED) 30 mg, Oral, EVERY 4 HOURS PRN       No Known Allergies    Review of Systems:   Constitutional: negative for weight changes or fevers  Genitourinary: negative for bowel/bladder incontinence   Musculoskeletal: positive for low back pain, right leg pain  Neurological: negative for any leg weakness or numbness/tingling  Behavioral/Psych: negative for anxiety/depression   All other systems reviewed and are negative    Objective:     Vitals:    10/11/22 1026   BP: 90/64         Constitutional: Pleasant, no acute distress   Head: Normocephalic, atraumatic   Eyes: Conjunctivae normal   Neck: Supple, symmetrical   Lungs: Normal respiratory effort, non-labored breathing   Cardiovascular: Limbs warm and well perfused   Abdomen: Non-protruded   Musculoskeletal: Muscle bulk symmetric, no atrophy, no gross deformities   · Lower Extremities: ROM WNL. · Thorax: No paraspinal tenderness bilaterally. No scoliosis or kyphosis. · Lumbar Spine: ROM WNL. Lumbar paraspinals mildly tender to palpation bilaterally. SLR neg bilaterally. ROSSY positive bilaterally (improved). GAENSLEN positive bilaterally(improved). Positive SI joint distraction bilaterally(improved). Positive facet loading bilaterally. Bilateral SI joints non-tender to palpation. Bilateral greater trochanters non-tender to palpation. Neurological: Cranial nerves II-XII grossly intact. · Gait - Antalgic gait. Ambulates with assistive device (cane). · Motor: 3/5 muscle strength in bilateral hip flexion, knee flexion, knee extension, ankle dorsiflexion, and ankle plantar flexion (pain limited)  · Sensory: LT sensation intact in lower limbs, tingling through bilateral legs   · Reflexes: 2+ symmetrical in bilateral achilles, 2+ bilateral patellar, negative ankle clonus, downgoing babinski   Skin: No rashes or lesions present   Psychological: Cooperative, no exaggerated pain behaviors     Assessment:    Diagnosis Orders   1. Lumbar spondylosis        2. Facet arthropathy        3. Sacroiliac joint dysfunction of both sides        4. Neuropathic pain        5. Chronic pain syndrome        6. Radiculopathy, lumbosacral region  CHG FLUOR NEEDLE/CATH SPINE/PARASPINAL DX/THER ADDON    AK NJX DX/THER SBST INTRLMNR LMBR/SAC W/IMG GDN          Leanne Mathew is a 40 y. o.female presenting to the pain clinic for evaluation of low back and right leg pain. Patient's history and physical consistent with bilateral sacroiliac joint dysfunction. I have set her up for a bilateral SI joint injection with Dr. Saira Albarado. We discussed that she likely has several pain generators including lumbar radiculopathy and lumbar facet mediated pain. We discussed the potential of doing an SI ablation if she fails the SI injection or only receives a short amount of relief. We also briefly discussed the possibility of doing lumbar epidurals vs. facet injections in the future. Patient had a significant response to the bilateral SI joint injection. She has noticed increased pain in regards to the lumbar radiculopathy. I have set her up for a lumbar epidural steroid injection at L4-5 with Dr. Merlinda Eke. We discussed potentially pursuing bilateral SI joint radiofrequency ablations for more longstanding relief and reduction of steroid use. Plan: The following treatment recommendations and plan were discussed in detail with Ainsley Casiano. Imaging:   I have reviewed patients imaging of lumbar CT and results were discussed with patient today. Analgesics:   Patient is taking Acetaminophen. Patient informed that the maximum amount of acetaminophen taken on a regular basis should only be 4000 mg per day. Adjuvants:   PROCEDURES ONLY    Interventions: In presence of lumbosacral radiating pain, the option of lumbar epidural steroid injection approach at L4-5 was chosen. The risks and benefits were discussed in detail with the patient.  Patient wants to proceed with the injection. Anticoagulation/NPO Recommendations:   Patient does need to hold Ibuprofen x 2 days prior to the procedure. Patient does need to be NPO x8 hours prior to the procedure. We will start an IV for the procedure  Patient will need a     Multidisciplinary Pain Management:   In the presence of complex, chronic, and multi-factorial pain, the importance of a multidisciplinary approach to pain management in the patients management regimen was emphasized and discussed in great detail. PHYSICAL THERAPY: Patient is advised to see a physical therapist for gentle stretching exercises and conditioning exercises for management of pain.    PSYCHOLOGY: Patient is advised to see a clinical pain psychologist, for the psychosocial aspect of pain care through coping skills, relaxation strategies, cognitive group therapy etc.     Referrals:  None    Prescriptions Written This Visit:   None    Follow-up: LESI L4-5, follow up 6 weeks after injection     LIBAN Lugo - CNP

## 2022-10-19 ENCOUNTER — TELEPHONE (OUTPATIENT)
Dept: PHYSICAL MEDICINE AND REHAB | Age: 37
End: 2022-10-19

## 2022-10-21 ENCOUNTER — TELEPHONE (OUTPATIENT)
Dept: PHYSICAL MEDICINE AND REHAB | Age: 37
End: 2022-10-21

## 2022-10-21 NOTE — TELEPHONE ENCOUNTER
LVM for pt to call the office regarding procedure rescheduling. Advised to return call to verify receiving message.

## 2022-11-02 ENCOUNTER — OFFICE VISIT (OUTPATIENT)
Dept: PSYCHIATRY | Age: 37
End: 2022-11-02
Payer: MEDICARE

## 2022-11-02 DIAGNOSIS — F31.9 BIPOLAR I DISORDER (HCC): Primary | ICD-10-CM

## 2022-11-02 DIAGNOSIS — F41.1 GENERALIZED ANXIETY DISORDER: ICD-10-CM

## 2022-11-02 DIAGNOSIS — F41.0 PANIC DISORDER: ICD-10-CM

## 2022-11-02 PROCEDURE — 90792 PSYCH DIAG EVAL W/MED SRVCS: CPT | Performed by: STUDENT IN AN ORGANIZED HEALTH CARE EDUCATION/TRAINING PROGRAM

## 2022-11-02 PROCEDURE — 4004F PT TOBACCO SCREEN RCVD TLK: CPT | Performed by: STUDENT IN AN ORGANIZED HEALTH CARE EDUCATION/TRAINING PROGRAM

## 2022-11-02 RX ORDER — CLONIDINE HYDROCHLORIDE 0.1 MG/1
0.1 TABLET ORAL 2 TIMES DAILY
Qty: 60 TABLET | Refills: 3 | Status: SHIPPED | OUTPATIENT
Start: 2022-11-02

## 2022-11-02 RX ORDER — HYDROXYZINE PAMOATE 50 MG/1
50 CAPSULE ORAL 2 TIMES DAILY PRN
Qty: 60 CAPSULE | Refills: 1 | Status: SHIPPED | OUTPATIENT
Start: 2022-11-02

## 2022-11-02 RX ORDER — LAMOTRIGINE 25 MG/1
50 TABLET ORAL DAILY
Qty: 40 TABLET | Refills: 0 | Status: SHIPPED | OUTPATIENT
Start: 2022-11-02 | End: 2022-11-15 | Stop reason: DRUGHIGH
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
## 2022-11-15 ENCOUNTER — TELEMEDICINE (OUTPATIENT)
Dept: PSYCHIATRY | Age: 37
End: 2022-11-15
Payer: MEDICARE

## 2022-11-15 DIAGNOSIS — R41.840 CONCENTRATION DEFICIT: ICD-10-CM

## 2022-11-15 DIAGNOSIS — F41.1 GENERALIZED ANXIETY DISORDER: ICD-10-CM

## 2022-11-15 DIAGNOSIS — F31.9 BIPOLAR I DISORDER (HCC): Primary | ICD-10-CM

## 2022-11-15 DIAGNOSIS — F41.0 PANIC DISORDER: ICD-10-CM

## 2022-11-15 PROCEDURE — G8428 CUR MEDS NOT DOCUMENT: HCPCS | Performed by: STUDENT IN AN ORGANIZED HEALTH CARE EDUCATION/TRAINING PROGRAM

## 2022-11-15 PROCEDURE — 4004F PT TOBACCO SCREEN RCVD TLK: CPT | Performed by: STUDENT IN AN ORGANIZED HEALTH CARE EDUCATION/TRAINING PROGRAM

## 2022-11-15 PROCEDURE — 99214 OFFICE O/P EST MOD 30 MIN: CPT | Performed by: STUDENT IN AN ORGANIZED HEALTH CARE EDUCATION/TRAINING PROGRAM

## 2022-11-15 PROCEDURE — G8484 FLU IMMUNIZE NO ADMIN: HCPCS | Performed by: STUDENT IN AN ORGANIZED HEALTH CARE EDUCATION/TRAINING PROGRAM

## 2022-11-15 PROCEDURE — G8417 CALC BMI ABV UP PARAM F/U: HCPCS | Performed by: STUDENT IN AN ORGANIZED HEALTH CARE EDUCATION/TRAINING PROGRAM

## 2022-11-15 RX ORDER — METHYLPHENIDATE HYDROCHLORIDE 10 MG/1
10 TABLET ORAL 2 TIMES DAILY
Qty: 60 TABLET | Refills: 0 | Status: SHIPPED | OUTPATIENT
Start: 2022-11-15 | End: 2022-12-15

## 2022-11-15 RX ORDER — METHYLPHENIDATE HYDROCHLORIDE 5 MG/1
5 TABLET ORAL 2 TIMES DAILY
Qty: 60 TABLET | Refills: 0 | Status: SHIPPED | OUTPATIENT
Start: 2022-11-15 | End: 2022-12-15

## 2022-11-15 RX ORDER — LAMOTRIGINE 100 MG/1
100 TABLET ORAL DAILY
Qty: 30 TABLET | Refills: 3 | Status: SHIPPED | OUTPATIENT
Start: 2022-11-15

## 2022-11-15 NOTE — PROGRESS NOTES
[address] [facility] [address]  [facility] [place] [address]  Dept: [phone]  Loc: [address] [facility]  1985  11/15/2022     F/U appt    TELEHEALTH EVALUATION -- Audio/Visual (During THFTD-70 public health emergency)    Patient location: home  Physician location: home, [facility]  This virtual visit was conducted via interactive, real-time video. DSM:  Dx:    ICD-10-CM    1. Bipolar I disorder (HCC)  F31.9 lamoTRIgine (LAMICTAL) 100 MG tablet      2. Generalized anxiety disorder  F41.1       3. Panic disorder  F41.0       4. Concentration deficit  R41.840 methylphenidate (RITALIN) 10 MG tablet     methylphenidate (RITALIN) 5 MG tablet     External Referral To Neuropsychology           Interim Hx:  Chief Complaint:   Chief Complaint   Patient presents with    Depression    Follow-up        HPI: Patient is a 40year old female who presents for follow up. Anxiety has improved with clonidine. She has only needed her PRN anxiolytic twice thus far. She reports mood is \"the same\". She reports compliance and denies side effects. She has been taking lamotrigine 50 mg daily and is agreeable with increasing the dose to 100 mg daily for mood. Energy and motivation remain below baseline. Patient reports her ritalin used to improve her concentration ability substantially but has less of an effect nowadays. She continues to endorse impairment in focus and concentration. Denies active and passive SI, HI, AH and VH. ROS:   Gen: Denies F/C/N/V  HEENT: Denies Vision/hearing changes/sore throat  CV: Denies CP/Palp  Pulm: Denies SOB/Cough/Wheeze  GI: Denies Abd pain  Skin: Denies Rashes/Lumps/Bruises  Neurologic: Denies changes in memory/speech/mental status.  Denies h/o seizures/DTs   Psychiatric: +depression +anxiety     MSE:  Cognition - Alert and Oriented x 4  Appearance - dressed casually and appropriately for the weather  Behavior - pleasant and cooperative  Motor - no tardive dyskinesia or other EPS observed; no psychomotor agitation/retardation observed  Gait and Station - unable to assess via tele-psychiatry  Speech - normal rate, rhythm, and volume  Eye Contact - maintains  Mood - \"the same\"  Affect - congruent  Thought Content/Perceptions:   Psychosis - denies AH/VH/delusions   SI/HI -  denies HI; denies SI  Thought Process/Associations -  logical and linear  Memory - grossly intact  Insight - good  Judgment - good       Diagnosis Orders   1. Bipolar I disorder (HCC)  lamoTRIgine (LAMICTAL) 100 MG tablet      2. Generalized anxiety disorder        3. Panic disorder        4. Concentration deficit  methylphenidate (RITALIN) 10 MG tablet    methylphenidate (RITALIN) 5 MG tablet    External Referral To Neuropsychology           Plan:  -Increase lamotrigine 50 mg daily to 100 mg daily for mood with plan to titrate further. Risk of [employer] Syndrome was discussed.  -Continue hydroxyzine 50 mg bid prn for anxiety. Instructed not to drive or operate heavy machinery soon after taking this medication due to potential sedation.  -Continue clonidine 0.1 mg bid for anxiety. Instructed to discontinue medication if patient feels lightheaded or dizzy. -Increase ritalin from 10 mg bid to 15 mg bid due to lack of efficacy. Risks including but not limited to hypertension, addictive potential, and increased risk of stroke/MI were discussed.   -Pt reports being adherent to medications and encouraged to continue    -Discussed medications with the patient and changes are as noted above with patient agreement.  -Discussed diagnosis and treatment plan with the patient  -Provided brief supportive therapy through active listening     -Patient Education: Discussed medications including indications, potential side effects, contraindications, and risks/benefits; also discussed alternative medications/treatments.   Discussed the potential need for follow up laboratory studies related to medications and patient demonstrated understanding and compliance. Patient participated in medications decision making was given the opportunity to ask questions/express preferences and concerns. Patient demonstrates good understanding of medications and is compliant/in agreement with current plan. -Referral sent for psychotherapy  -Referral sent for ADHD testing    -RTC in (3) weeks, sooner if needed  -Labs: n/a  -Discussed abstaining from drugs/alcohol  -Continue to work with /therapist  -Patient informed to call crisis line/911 or go to ER if experiencing crisis, SI, HI, or psychosis. [facility], was evaluated through a synchronous (real-time) audio-video encounter. The patient (or guardian if applicable) is aware that this is a billable service, which includes applicable copays. This virtual visit was conducted with patient's (and/or legal guardian's) consent. The visit was conducted pursuant to the emergency declaration under the [address] [place] [address], [address] [place] [address] authority and the [employer] General Act. Patient identification was verified, and a caregiver was present when appropriate. The patient was located in a state where the provider was licensed to provide care.       Total time spent for this encounter: 30 minutes    Electronically signed by Navid Warner DO on 11/15/2022 at 5:01 PM     Navid Warner DO  11/15/2022

## 2022-11-23 NOTE — DISCHARGE INSTRUCTIONS

## 2022-11-25 ENCOUNTER — PREP FOR PROCEDURE (OUTPATIENT)
Dept: PHYSICAL MEDICINE AND REHAB | Age: 37
End: 2022-11-25

## 2022-11-27 NOTE — H&P
Today, patient presents for planned lumbar epidural steroid injection approach at L4-5    This note is reflective of the patient's previous visit for evaluation. We will proceed with today's planned procedure. Since patient's last visit for evaluation, there have been no interval changes in medical history. Patient has no new numbness, weakness, or focal neurological deficit since evaluation. Patient has no contraindications to injection (no anticoagulation or recent antibiotic intake for active infections), and has a  present or is able to drive themselves (as discussed and cleared by physician). Allergies to latex, contrast dye, and steroid medications have been confirmed with the patient prior to the procedure. NPO necessity has been assessed and accepted based on procedure complexity. The risks and benefits of the procedure have been explained including but are not limited to infection, bleeding, paralysis, immediate post procedure weakness, and dizziness; the patient acknowledges understanding and desires to proceed with the procedure. Patient has signed consent for same procedure as discussed in previous clinic encounter. All other questions and concerns were addressed at bedside. See procedure note for full details. Post procedure Instructions: The patient was advised not to drive during the day of the procedure and not to engage in any significant decision making (unless otherwise states by physician). The patient was also advised to be cautious with walking/activity for 24 hours following today's visit and asked not to engage in over-exertion (unless otherwise states by physician). After this time, it is ok to resume pre-procedure level of activity. Patient advised to apply ice to site of injection in situations of pain and discomfort. Patient advised to not submerge site of injection during bath or pool activities for approximately 24 hours post-procedure.  Patient attested to understanding post procedure directions / restrictions. All other questions and concerns addressed before patient discharge in ambulatory fashion. Chronic Pain/PM&R Clinic Note     Encounter Date: 10/11/22    Subjective:   Chief Complaint:   No chief complaint on file. History of Present Illness:   Julee Lima is a 40 y.o. female seen in the clinic initially on 04/11/2022 upon request from [place], [place]  for her history of low back pain. States she has had low back pain for several years but it has been getting significantly worse in the last year, especially since October 2021. Patient denies any prior events that may have caused aggravation of her pain. Patient states she tries to stay active but has been very difficult due to her pain level. She states she hears grinding in her right low back. She states pain is mainly located right low back radiating down her right leg to her right knee. She will occasionally get pain that shoots down to her right foot, this is dependent on how active she is. She has recently noticed some pain in her left low back radiating into her left leg. Patient states her pain is worse with increased activity and better at rest.  Patient states she uses a cane to help take the pressure off her back. When she is out shopping she has to use a cart to help take the pressure off her back. Patient currently walks with a cane outside of the home. She states she did fall last week when crouching down under a desk and lost her balance. She does state she feels like she is off balance frequently. Patient states she has trouble sleeping because she cannot lay in 1 position for very long without having pain. Patient states she will sometimes get pressure in her right hip and it feels like it is swollen. Patient states she did physical therapy a few years back which was ineffective and almost made things worse.   Patient denies any bowel/bladder incontinence or saddle anesthesia. Patient currently moved back here from [place] within the last few months and she has yet to get established with a family doctor. She currently lives at home with her 3 kids ranging from ages 14-13. She is currently working online to get her masters degree. 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 She currently has a ventriculoperitoneal shunt, but it is shut off. She also has a history of Chiari malformation and decompression. She was previously seeing a specialist in [facility] but has not been there since 2019. Today, 10/11/2022, patient presents for planned follow-up on chronic low back pain. Patient underwent the bilateral SI joint injection on 8/17/2022. She has noticed significant improvement in SI joint pain. She has noticed increased low back and leg pain and would like to repeat the epidural steroid injection. Patient states she was able to be much more active this summer due to getting relief from her injections, she is very happy about this. She has not notice any new symptoms such as new leg paresthesias, leg weakness, saddle anesthesia, bowel/bladder incontinence. History of Interventions:   Surgery: No previous lumbar surgeries  Injections:   Injection in office in 2018 for sciatica (Ordered by Dr. Elier Murphy)   B/L SI joint injection (05/03/2022) - relief in SI pain (increased midline low back pain)  LESI L4-5 (06/28/2022) - effective  B/L SI joint injection (08/17/2022) - effective x 3 months    Current Treatment Medications:   Ibuprofen - relatively ineffective   Tylenol - ineffecitve     Historical Treatment Medications:   Flexeril - ineffective  Baclofen - ineffective   Gabapentin - helps a little. 100mg TID   Lyrica - s/e  Cymbalta - fibromyalgia, withdrawal was terrible  Amitriptyline - depression - bipolar - not recently.    Zanaflex - ineffective   Mobic - some relief   Norco  Mobic - upset stomach, ineffective    Imaging:  CT Lumbar (01/20/2022)  Narrative   PROCEDURE: CT LUMBAR SPINE W CONTRAST       CLINICAL INFORMATION: DDD (degenerative disc disease), lumbosacral, Bilateral stenosis of lateral recess of lumbar spine, Lumbar radiculopathy. COMPARISON: Intra-articular administration of contrast performed on the same day. Plain radiographs dated 21 March 2010. Laurel Delgado TECHNIQUE: 3 mm axial images were obtained of the lumbar spine following intrathecal administration of 18 mL of Omnipaque 180. Sagittal and coronal reconstructions were obtained. Angled images were reconstructed through the L3-4, L4-5 and L5-S1 disc    levels. All CT scans at this facility use dose modulation, iterative reconstruction, and/or weight-based dosing when appropriate to reduce radiation dose to as low as reasonably achievable. FINDINGS:               The lumbar vertebral bodies are normally aligned. There are no compression fractures. No pars defects are noted. There appears be an intraspinal catheter entering the spine at L4-5       There are no gross abnormalities within the spinal canal.       On the axial images, at T11-T12, T12-L1 and L1-L2, there is no significant disc herniation, canal or foraminal stenosis. At L2-3, there is a 2.6 mm disc protrusion. This results in mild-to-moderate canal and bilateral foraminal stenosis. At L3-4, there is no disc herniation, canal or foraminal stenosis. At L4-5, there is a 4 mm disc protrusion and facet hypertrophy this results in moderate severe canal and bilateral foraminal stenosis. At L5-S1, there is a 1.8 mm disc protrusion and facet hypertrophy. This results in mild canal and moderate bilateral foraminal stenosis. There is mild degenerative change involving the sacroiliac joints bilaterally       There is a 16 mm sclerotic lesion. In the left iliac crest.       There is a possible intraperitoneal catheter present. Impression       1. Degenerative changes most marked at L4-5, at which level there is moderate to severe canal and bilateral foraminal stenosis. There appears to be an intraspinal catheter entering the spine at L4-5.   2. There is mild canal and moderate bilateral foraminal stenosis at L5-S1. .   3. There is mild-to-moderate canal and bilateral foraminal stenosis at L2-3.   4. There is degenerative change involving the sacroiliac joints bilaterally. 5. There is a 16 mm sclerotic lesion involving the left iliac crest.   6. There is a possible intraperitoneal catheter present. .                   **This report has been created using voice recognition software. It may contain minor errors which are inherent in voice recognition technology. **       Final report electronically signed by DR Arben Greenberg on 1/20/2022 11:55 AM         Past Medical History:   Diagnosis Date    ADHD (attention deficit hyperactivity disorder)     Bipolar disorder ([facility] [address]     Chiari malformation     Depression     Fibromyalgia     Pseudotumor cerebri        Past Surgical History:   Procedure Laterality Date    BACK INJECTION Bilateral 5/3/2022    bilateral sacroiliac joint injection.  performed by Areli Lopez DO at [facility] Bilateral 8/17/2022    bilateral sacroiliac joint injection performed by Areli Lopez DO at [address] [place] [address]  2007    OTHER SURGICAL HISTORY  2007    lumbar shunt/ shunt    OTHER SURGICAL HISTORY      chiari decompression    PAIN MANAGEMENT PROCEDURE N/A 6/28/2022    lumbar epidural steroid injection Lumbar 4-5 performed by Areli Lopez DO at [address] [place] [address]         Family History   Problem Relation Age of Onset    Hypertension Mother     Alzheimer's Disease Father     Stroke Father     Heart Disease Father          Medications & Allergies:   Current Outpatient Medications   Medication Instructions    cloNIDine (CATAPRES) 0.1 mg, Oral, 2 TIMES DAILY, (Take 1 tablet in the morning and 1 tablet at bedtime for anxiety)    hydrOXYzine pamoate (VISTARIL) 50 mg, Oral, 2 TIMES DAILY PRN    lamoTRIgine (LAMICTAL) 100 mg, Oral, DAILY    Loratadine (CLARITIN PO) Oral    methylphenidate (RITALIN) 10 mg, Oral, 2 TIMES DAILY, (Take with additional 5 mg tablet for a total of 15 mg twice per day)    methylphenidate (RITALIN) 5 mg, Oral, 2 TIMES DAILY    pseudoephedrine (SUDAFED) 30 mg, Oral, EVERY 4 HOURS PRN       No Known Allergies    Review of Systems:   Constitutional: negative for weight changes or fevers  Genitourinary: negative for bowel/bladder incontinence   Musculoskeletal: positive for low back pain, right leg pain  Neurological: negative for any leg weakness or numbness/tingling  Behavioral/Psych: negative for anxiety/depression   All other systems reviewed and are negative    Objective: There were no vitals filed for this visit. Constitutional: Pleasant, no acute distress   Head: Normocephalic, atraumatic   Eyes: Conjunctivae normal   Neck: Supple, symmetrical   Lungs: Normal respiratory effort, non-labored breathing   Cardiovascular: Limbs warm and well perfused   Abdomen: Non-protruded   Musculoskeletal: Muscle bulk symmetric, no atrophy, no gross deformities   · Lower Extremities: ROM WNL. · Thorax: No paraspinal tenderness bilaterally. No scoliosis or kyphosis. · Lumbar Spine: ROM WNL. Lumbar paraspinals mildly tender to palpation bilaterally. SLR neg bilaterally. ROSSY positive bilaterally (improved). GAENSLEN positive bilaterally(improved). Positive SI joint distraction bilaterally(improved). Positive facet loading bilaterally. Bilateral SI joints non-tender to palpation. Bilateral greater trochanters non-tender to palpation. Neurological: Cranial nerves II-XII grossly intact. · Gait - Antalgic gait. Ambulates with assistive device (cane).    · Motor: 3/5 muscle strength in bilateral hip flexion, knee flexion, knee extension, ankle dorsiflexion, and ankle plantar flexion (pain limited)  · Sensory: LT sensation intact in lower limbs, tingling through bilateral legs   · Reflexes: 2+ symmetrical in bilateral achilles, 2+ bilateral patellar, negative ankle clonus, downgoing babinski   Skin: No rashes or lesions present   Psychological: Cooperative, no exaggerated pain behaviors     Assessment:    Diagnosis Orders   1. Lumbar spondylosis        2. Facet arthropathy        3. Sacroiliac joint dysfunction of both sides        4. Neuropathic pain        5. Chronic pain syndrome        6. Radiculopathy, lumbosacral region  CHG FLUOR NEEDLE/CATH SPINE/PARASPINAL DX/THER ADDON    OR NJX DX/THER SBST INTRLMNR LMBR/SAC W/IMG GDN          Leanne Reyes is a 40 y. o.female presenting to the pain clinic for evaluation of low back and right leg pain. Patient's history and physical consistent with bilateral sacroiliac joint dysfunction. I have set her up for a bilateral SI joint injection with Dr. Boss Post. We discussed that she likely has several pain generators including lumbar radiculopathy and lumbar facet mediated pain. We discussed the potential of doing an SI ablation if she fails the SI injection or only receives a short amount of relief. We also briefly discussed the possibility of doing lumbar epidurals vs. facet injections in the future. Patient had a significant response to the bilateral SI joint injection. She has noticed increased pain in regards to the lumbar radiculopathy. I have set her up for a lumbar epidural steroid injection at L4-5 with Dr. Katherine Monzon. We discussed potentially pursuing bilateral SI joint radiofrequency ablations for more longstanding relief and reduction of steroid use. Plan: The following treatment recommendations and plan were discussed in detail with Xin Savage.     Imaging:   I have reviewed patients imaging of lumbar CT and results were discussed with patient today. Analgesics:   Patient is taking Acetaminophen. Patient informed that the maximum amount of acetaminophen taken on a regular basis should only be 4000 mg per day. Adjuvants:   PROCEDURES ONLY    Interventions: In presence of lumbosacral radiating pain, the option of lumbar epidural steroid injection approach at L4-5 was chosen. The risks and benefits were discussed in detail with the patient. Patient wants to proceed with the injection. Anticoagulation/NPO Recommendations:   Patient does need to hold Ibuprofen x 2 days prior to the procedure. Patient does need to be NPO x8 hours prior to the procedure. We will start an IV for the procedure  Patient will need a     Multidisciplinary Pain Management:   In the presence of complex, chronic, and multi-factorial pain, the importance of a multidisciplinary approach to pain management in the patients management regimen was emphasized and discussed in great detail. PHYSICAL THERAPY: Patient is advised to see a physical therapist for gentle stretching exercises and conditioning exercises for management of pain.    PSYCHOLOGY: Patient is advised to see a clinical pain psychologist, for the psychosocial aspect of pain care through coping skills, relaxation strategies, cognitive group therapy etc.     Referrals:  None    Prescriptions Written This Visit:   None    Follow-up: LESI L4-5, follow up 6 weeks after injection     Marlon Zaman DO

## 2022-11-29 ENCOUNTER — HOSPITAL ENCOUNTER (OUTPATIENT)
Age: 37
Setting detail: OUTPATIENT SURGERY
Discharge: HOME | End: 2022-11-29
Attending: ANESTHESIOLOGY | Admitting: ANESTHESIOLOGY
Payer: MEDICARE

## 2022-11-29 ENCOUNTER — APPOINTMENT (OUTPATIENT)
Dept: GENERAL RADIOLOGY | Age: 37
End: 2022-11-29
Attending: ANESTHESIOLOGY
Payer: MEDICARE

## 2022-11-29 VITALS
DIASTOLIC BLOOD PRESSURE: 56 MMHG | WEIGHT: 228.2 LBS | OXYGEN SATURATION: 100 % | HEART RATE: 64 BPM | SYSTOLIC BLOOD PRESSURE: 115 MMHG | RESPIRATION RATE: 16 BRPM | TEMPERATURE: 97.3 F | HEIGHT: 64 IN | BODY MASS INDEX: 38.96 KG/M2

## 2022-11-29 PROCEDURE — 99152 MOD SED SAME PHYS/QHP 5/>YRS: CPT | Performed by: ANESTHESIOLOGY

## 2022-11-29 PROCEDURE — 7100000010 HC PHASE II RECOVERY - FIRST 15 MIN: Performed by: ANESTHESIOLOGY

## 2022-11-29 PROCEDURE — 3209999900 FLUORO FOR SURGICAL PROCEDURES

## 2022-11-29 PROCEDURE — 6360000002 HC RX W HCPCS: Performed by: ANESTHESIOLOGY

## 2022-11-29 PROCEDURE — 2500000003 HC RX 250 WO HCPCS: Performed by: ANESTHESIOLOGY

## 2022-11-29 PROCEDURE — 3600000054 HC PAIN LEVEL 3 BASE: Performed by: ANESTHESIOLOGY

## 2022-11-29 PROCEDURE — 7100000011 HC PHASE II RECOVERY - ADDTL 15 MIN: Performed by: ANESTHESIOLOGY

## 2022-11-29 PROCEDURE — 2709999900 HC NON-CHARGEABLE SUPPLY: Performed by: ANESTHESIOLOGY

## 2022-11-29 PROCEDURE — 6360000004 HC RX CONTRAST MEDICATION: Performed by: ANESTHESIOLOGY

## 2022-11-29 RX ORDER — BACTERIOSTATIC SODIUM CHLORIDE 0.9 %
VIAL (ML) INJECTION PRN
Status: DISCONTINUED | OUTPATIENT
Start: 2022-11-29 | End: 2022-11-29 | Stop reason: ALTCHOICE

## 2022-11-29 RX ORDER — LIDOCAINE HYDROCHLORIDE 10 MG/ML
INJECTION, SOLUTION EPIDURAL; INFILTRATION; INTRACAUDAL; PERINEURAL PRN
Status: DISCONTINUED | OUTPATIENT
Start: 2022-11-29 | End: 2022-11-29 | Stop reason: ALTCHOICE

## 2022-11-29 RX ORDER — FENTANYL CITRATE 50 UG/ML
INJECTION, SOLUTION INTRAMUSCULAR; INTRAVENOUS PRN
Status: DISCONTINUED | OUTPATIENT
Start: 2022-11-29 | End: 2022-11-29 | Stop reason: ALTCHOICE

## 2022-11-29 RX ORDER — DEXAMETHASONE SODIUM PHOSPHATE 4 MG/ML
INJECTION, SOLUTION INTRA-ARTICULAR; INTRALESIONAL; INTRAMUSCULAR; INTRAVENOUS; SOFT TISSUE PRN
Status: DISCONTINUED | OUTPATIENT
Start: 2022-11-29 | End: 2022-11-29 | Stop reason: ALTCHOICE

## 2022-11-29 RX ORDER — MIDAZOLAM HYDROCHLORIDE 1 MG/ML
INJECTION INTRAMUSCULAR; INTRAVENOUS PRN
Status: DISCONTINUED | OUTPATIENT
Start: 2022-11-29 | End: 2022-11-29 | Stop reason: ALTCHOICE

## 2022-11-29 RX ORDER — BUPIVACAINE HYDROCHLORIDE 2.5 MG/ML
INJECTION, SOLUTION EPIDURAL; INFILTRATION; INTRACAUDAL PRN
Status: DISCONTINUED | OUTPATIENT
Start: 2022-11-29 | End: 2022-11-29 | Stop reason: ALTCHOICE

## 2022-11-29 ASSESSMENT — PAIN - FUNCTIONAL ASSESSMENT: PAIN_FUNCTIONAL_ASSESSMENT: 0-10

## 2022-11-29 NOTE — PROCEDURES
Pre-operative Diagnosis: Radicular leg pain     Post-operative Diagnosis: Radicular leg pain     Procedure: Lumbar epidural steroid injection    Procedure Description:  After having obtained a signed informed consent, the patient was taken to the fluoroscopy suite and placed in the prone position. The patient's back was prepped with chloraprep and draped in a sterile fashion. A total of 1.5 cc of 1 % lidocaine was used to anesthetize the skin and underlying tissues. Due to anatomy we were unable to get into her L4/L5 interlaminar space. Under fluoroscopic guidance, a single 20G Tuohy needle was advanced using midline approach at the L5/S1 interspace until gaining the epidural space using the loss of resistance to saline syringe technique. There were no paresthesias, heme, or CSF aspiration. A total of 0.25 cc of Omnipaque 300 were injected having had adequate dye spread within the epidural space. Needle placement and contrast spread was confirmed using the AP and contralateral views. 10 mg of Dexamethasone with 1 cc of saline solution were injected in the epidural space. The needle was flushed and removed without any complication. The patient tolerated the procedure well and was transported to the recovery room. The patient was observed for 15 minutes to then discharged in an ambulatory fashion.     Procedural Complications: None  Estimated Blood Loss: 0 mL      IV sedation was used during the procedure:  - Moderate intravenous conscious sedation was supervised by Dr. Kyle Alfaro  - The patient was independently monitored by a Registered Nurse assigned to the procedure room  - Monitoring included automated blood pressure, continuous EKG, and continuous pulse oximetry  - The detailed conscious record is permanently stored in the [place] [id number]  - The following is the conscious sedation record:  Start Time: 09:55  End Time : 10:10  Duration: 15 minutes   Medications Administered: 2 mg Versed, 50 mcg Fentanyl       Marlon Peñaloza, DO  Interventional Pain Management/PM&R   [place]

## 2022-11-29 NOTE — POST SEDATION
[address] [address]  Sedation/Analgesia Post Sedation Record    Pt Name: Rishabh Peter  MRN: [id number]  YOB: 1985  Procedure Performed By: Torres Sin DO  Primary Care Physician: No primary care provider on file.     POST-PROCEDURE    Physicians/Assistants: Torres Sin DO  Procedure Performed: See Procedure Note   Sedation/Anesthesia: Versed and Fentanyl (See procedure note for amount and duration)  Estimated Blood Loss:     0  ml  Specimens Removed: None        Complications: None           Marlon Montoya DO  Electronically signed 11/29/2022 at 11:46 AM

## 2022-11-29 NOTE — PROGRESS NOTES
1006: Patient arrived to Phase II recovery via cart. Awake and alert. Report received from Valente Roth 33: VSS, patient denies pain. HOB elevated and snack and drink provided. Call light placed within reach. 1025: Patient sat edge of bed without difficulty and dressed independently in room. 1030: Patient ambulatory to vehicle and discharged home in stable condition with son.

## 2022-12-06 ENCOUNTER — TELEMEDICINE (OUTPATIENT)
Dept: PSYCHIATRY | Age: 37
End: 2022-12-06
Payer: MEDICARE

## 2022-12-06 DIAGNOSIS — F31.9 BIPOLAR I DISORDER (HCC): Primary | ICD-10-CM

## 2022-12-06 DIAGNOSIS — F41.0 PANIC DISORDER: ICD-10-CM

## 2022-12-06 DIAGNOSIS — F41.1 GENERALIZED ANXIETY DISORDER: ICD-10-CM

## 2022-12-06 DIAGNOSIS — R41.840 CONCENTRATION DEFICIT: ICD-10-CM

## 2022-12-06 PROCEDURE — G8428 CUR MEDS NOT DOCUMENT: HCPCS | Performed by: STUDENT IN AN ORGANIZED HEALTH CARE EDUCATION/TRAINING PROGRAM

## 2022-12-06 PROCEDURE — G8417 CALC BMI ABV UP PARAM F/U: HCPCS | Performed by: STUDENT IN AN ORGANIZED HEALTH CARE EDUCATION/TRAINING PROGRAM

## 2022-12-06 PROCEDURE — 4004F PT TOBACCO SCREEN RCVD TLK: CPT | Performed by: STUDENT IN AN ORGANIZED HEALTH CARE EDUCATION/TRAINING PROGRAM

## 2022-12-06 PROCEDURE — G8484 FLU IMMUNIZE NO ADMIN: HCPCS | Performed by: STUDENT IN AN ORGANIZED HEALTH CARE EDUCATION/TRAINING PROGRAM

## 2022-12-06 PROCEDURE — 99214 OFFICE O/P EST MOD 30 MIN: CPT | Performed by: STUDENT IN AN ORGANIZED HEALTH CARE EDUCATION/TRAINING PROGRAM

## 2022-12-06 RX ORDER — LAMOTRIGINE 200 MG/1
200 TABLET ORAL DAILY
Qty: 60 TABLET | Refills: 1 | Status: SHIPPED | OUTPATIENT
Start: 2022-12-06

## 2022-12-06 RX ORDER — DEXTROAMPHETAMINE SACCHARATE, AMPHETAMINE ASPARTATE MONOHYDRATE, DEXTROAMPHETAMINE SULFATE AND AMPHETAMINE SULFATE 3.75; 3.75; 3.75; 3.75 MG/1; MG/1; MG/1; MG/1
15 CAPSULE, EXTENDED RELEASE ORAL EVERY MORNING
Qty: 21 CAPSULE | Refills: 0 | Status: SHIPPED | OUTPATIENT
Start: 2022-12-06 | End: 2022-12-27

## 2022-12-06 NOTE — PROGRESS NOTES
[address]  [place] [address]  Dept: [phone]  Loc: [address] [facility]  1985 12/6/2022     F/U appt    TELEHEALTH EVALUATION -- Audio/Visual (During JCKSS-09 public health emergency)    Patient location: home  Physician location: home, [facility]  This virtual visit was conducted via interactive, real-time video. DSM:  Dx:    ICD-10-CM    1. Bipolar I disorder ([facility] [address]  F31.9       2. Generalized anxiety disorder  F41.1       3. Panic disorder  F41.0       4. Concentration deficit  R41.840            Interim Hx:  Chief Complaint:   Chief Complaint   Patient presents with    Follow-up    Medication Check        HPI: Patient is a 40year old female who presents for follow up. Anxiety has improved with clonidine. She continues to endorse depression. She reports compliance and denies side effects. Energy and motivation remain below baseline. Patient reports lack of efficacy from ritalin. She continues to endorses heightened distractibility. She continues to endorse impairment in focus and concentration. Denies active and passive SI, HI, AH and VH. ROS:   Gen: Denies F/C/N/V  HEENT: Denies Vision/hearing changes/sore throat  CV: Denies CP/Palp  Pulm: Denies SOB/Cough/Wheeze  GI: Denies Abd pain  Skin: Denies Rashes/Lumps/Bruises  Neurologic: Denies changes in memory/speech/mental status.  Denies h/o seizures/DTs   Psychiatric: +depression +concentration deficit     MSE:  Cognition - Alert and Oriented x 4  Appearance - dressed casually and appropriately for the weather  Behavior - pleasant and cooperative  Motor - no tardive dyskinesia or other EPS observed; no psychomotor agitation/retardation observed  Gait and Station - unable to assess via tele-psychiatry  Speech - normal rate, rhythm, and volume  Eye Contact - maintains  Mood - \"depressed\"  Affect - congruent  Thought Content/Perceptions:   Psychosis - denies AH/VH/delusions   SI/HI -  denies HI; denies SI  Thought Process/Associations -  logical and linear  Memory - grossly intact  Insight - good  Judgment - good       Diagnosis Orders   1. Bipolar I disorder ([facility] [address]        2. Generalized anxiety disorder        3. Panic disorder        4. Concentration deficit             Plan:  -Increase lamotrigine 100 mg daily to 200 mg daily for mood. Risk of Leldon Bellis Syndrome was discussed.  -Continue hydroxyzine 50 mg bid prn for anxiety. Instructed not to drive or operate heavy machinery soon after taking this medication due to potential sedation.  -Continue clonidine 0.1 mg bid for anxiety. Instructed to discontinue medication if patient feels lightheaded or dizzy.  -Discontinue ritalin 15 mg bid due to lack of efficacy. Start adderall XR 15 mg qam for concentration deficit, which patient reports had efficacy in the past. Risks including but not limited to hypertension, addictive potential, and increased risk of stroke/MI were discussed.   -Pt reports being adherent to medications and encouraged to continue     -Discussed medications with the patient and changes are as noted above with patient agreement.  -Discussed diagnosis and treatment plan with the patient  -Provided brief supportive therapy through active listening     -Patient Education: Discussed medications including indications, potential side effects, contraindications, and risks/benefits; also discussed alternative medications/treatments. Discussed the potential need for follow up laboratory studies related to medications and patient demonstrated understanding and compliance. Patient participated in medications decision making was given the opportunity to ask questions/express preferences and concerns. Patient demonstrates good understanding of medications and is compliant/in agreement with current plan. -The patient is a female in her childbearing years.  She verbalized understanding that there are no medications that are completely safe in pregnancy or while breastfeeding. She states she does not believe that she is pregnant now and agrees to contact her prescribing psychiatrist immediately if she is pregnant or planning to become pregnant.      -Referral sent for psychotherapy  -Referral sent for ADHD testing    -RTC in (3) weeks, sooner if needed  -Labs: n/a  -Discussed abstaining from drugs/alcohol  -Continue to work with /therapist  -Patient informed to call crisis line/911 or go to ER if experiencing crisis, SI, HI, or psychosis. Eleonora Nino, was evaluated through a synchronous (real-time) audio-video encounter. The patient (or guardian if applicable) is aware that this is a billable service, which includes applicable copays. This virtual visit was conducted with patient's (and/or legal guardian's) consent. The visit was conducted pursuant to the emergency declaration under the [address] [place] [address], [address] [place] [address] authority and the [employer] General Act. Patient identification was verified, and a caregiver was present when appropriate. The patient was located in a state where the provider was licensed to provide care.       Total time spent for this encounter: 30 minutes    Electronically signed by Sandra Hernández DO on 12/6/2022 at 8:54 AM     Sandra Hernández DO  12/6/2022

## 2022-12-27 DIAGNOSIS — R41.840 CONCENTRATION DEFICIT: ICD-10-CM

## 2022-12-27 RX ORDER — DEXTROAMPHETAMINE SACCHARATE, AMPHETAMINE ASPARTATE MONOHYDRATE, DEXTROAMPHETAMINE SULFATE AND AMPHETAMINE SULFATE 3.75; 3.75; 3.75; 3.75 MG/1; MG/1; MG/1; MG/1
15 CAPSULE, EXTENDED RELEASE ORAL EVERY MORNING
Qty: 30 CAPSULE | Refills: 0 | Status: SHIPPED | OUTPATIENT
Start: 2022-12-27 | End: 2023-01-24 | Stop reason: SDUPTHER

## 2022-12-27 NOTE — TELEPHONE ENCOUNTER
Vin Montes is requesting a refill on the following medications:  Requested Prescriptions     Pending Prescriptions Disp Refills    amphetamine-dextroamphetamine (ADDERALL XR) 15 MG extended release capsule 21 capsule 0     Sig: Take 1 capsule by mouth every morning for 21 days.  (Take every morning for concentration)       Date of last visit: 12/6/2022  Date of next visit (if applicable):1/10/2023  Pharmacy Name: [facility]

## 2023-01-03 ENCOUNTER — OFFICE VISIT (OUTPATIENT)
Dept: PHYSICAL MEDICINE AND REHAB | Age: 38
End: 2023-01-03
Payer: MEDICARE

## 2023-01-03 VITALS
DIASTOLIC BLOOD PRESSURE: 74 MMHG | BODY MASS INDEX: 38.93 KG/M2 | WEIGHT: 228 LBS | HEIGHT: 64 IN | SYSTOLIC BLOOD PRESSURE: 96 MMHG

## 2023-01-03 DIAGNOSIS — M47.816 LUMBAR SPONDYLOSIS: ICD-10-CM

## 2023-01-03 DIAGNOSIS — M53.3 SACROILIAC JOINT DYSFUNCTION OF LEFT SIDE: ICD-10-CM

## 2023-01-03 DIAGNOSIS — M53.3 SACROILIAC JOINT DYSFUNCTION OF RIGHT SIDE: Primary | ICD-10-CM

## 2023-01-03 DIAGNOSIS — G89.4 CHRONIC PAIN SYNDROME: ICD-10-CM

## 2023-01-03 DIAGNOSIS — M54.17 RADICULOPATHY, LUMBOSACRAL REGION: ICD-10-CM

## 2023-01-03 PROCEDURE — 99214 OFFICE O/P EST MOD 30 MIN: CPT | Performed by: NURSE PRACTITIONER

## 2023-01-03 NOTE — PROGRESS NOTES
Chronic Pain/PM&R Clinic Note     Encounter Date: 1/3/23    Subjective:   Chief Complaint:   Chief Complaint   Patient presents with    Follow Up After Procedure     lumbar epidural steroid injection Lumbar 4-5  11/29/22       History of Present Illness:   Anali Anglin is a 40 y.o. female seen in the clinic initially on 04/11/2022 upon request from [place], [place]  for her history of low back pain. States she has had low back pain for several years but it has been getting significantly worse in the last year, especially since October 2021. Patient denies any prior events that may have caused aggravation of her pain. Patient states she tries to stay active but has been very difficult due to her pain level. She states she hears grinding in her right low back. She states pain is mainly located right low back radiating down her right leg to her right knee. She will occasionally get pain that shoots down to her right foot, this is dependent on how active she is. She has recently noticed some pain in her left low back radiating into her left leg. Patient states her pain is worse with increased activity and better at rest.  Patient states she uses a cane to help take the pressure off her back. When she is out shopping she has to use a cart to help take the pressure off her back. Patient currently walks with a cane outside of the home. She states she did fall last week when crouching down under a desk and lost her balance. She does state she feels like she is off balance frequently. Patient states she has trouble sleeping because she cannot lay in 1 position for very long without having pain. Patient states she will sometimes get pressure in her right hip and it feels like it is swollen. Patient states she did physical therapy a few years back which was ineffective and almost made things worse. Patient denies any bowel/bladder incontinence or saddle anesthesia.   Patient currently moved back here from [place] within the last few months and she has yet to get established with a family doctor. She currently lives at home with her 3 kids ranging from ages 14-13. She is currently working online to get her masters degree. 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 She currently has a ventriculoperitoneal shunt, but it is shut off. She also has a history of Chiari malformation and decompression. She was previously seeing a specialist in [place] but has not been there since 2019. Today, 01/03/2023, patient presents for planned follow up on chronic low back and leg pain. Patient underwent a lumbar epidural steroid injection at L4/5 on 11/29/2022 and reports significant relief with this injection. She has now started to notice her bilateral SI joint pain, right significantly worse than the left. She is questioning doing the SI radiofrequency ablation for more longstanding relief without use of steroids. She is questioning if she can set up a lumbar epidural after completion of the SI RFA, since last time she had to wait over 3 months to have the repeat epidural.  She denies any new symptoms such as new leg paresthesias, leg weakness, saddle anesthesia, bowel/bladder incontinence. History of Interventions:   Surgery: No previous lumbar surgeries  Injections:   B/L SI joint injection (05/03/2022) - relief in SI pain (increased midline low back pain)  LESI L4-5 (06/28/2022, 11/29/2022) - effective  B/L SI joint injection (08/17/2022) - effective x 3 months    Current Treatment Medications:   Ibuprofen - relatively ineffective   Tylenol - ineffecitve     Historical Treatment Medications:   Flexeril - ineffective  Baclofen - ineffective   Gabapentin - helps a little. 100mg TID   Lyrica - s/e  Cymbalta - fibromyalgia, withdrawal was terrible  Amitriptyline - depression - bipolar - not recently.    Zanaflex - ineffective   Norco  Mobic - upset stomach, ineffective    Imaging:  CT Lumbar (01/20/2022)  Narrative   PROCEDURE: CT LUMBAR SPINE W CONTRAST       CLINICAL INFORMATION: DDD (degenerative disc disease), lumbosacral, Bilateral stenosis of lateral recess of lumbar spine, Lumbar radiculopathy. COMPARISON: Intra-articular administration of contrast performed on the same day. Plain radiographs dated 21 March 2010. [employer] TECHNIQUE: 3 mm axial images were obtained of the lumbar spine following intrathecal administration of 18 mL of Omnipaque 180. Sagittal and coronal reconstructions were obtained. Angled images were reconstructed through the L3-4, L4-5 and L5-S1 disc    levels. All CT scans at this facility use dose modulation, iterative reconstruction, and/or weight-based dosing when appropriate to reduce radiation dose to as low as reasonably achievable. FINDINGS:               The lumbar vertebral bodies are normally aligned. There are no compression fractures. No pars defects are noted. There appears be an intraspinal catheter entering the spine at L4-5       There are no gross abnormalities within the spinal canal.       On the axial images, at T11-T12, T12-L1 and L1-L2, there is no significant disc herniation, canal or foraminal stenosis. At L2-3, there is a 2.6 mm disc protrusion. This results in mild-to-moderate canal and bilateral foraminal stenosis. At L3-4, there is no disc herniation, canal or foraminal stenosis. At L4-5, there is a 4 mm disc protrusion and facet hypertrophy this results in moderate severe canal and bilateral foraminal stenosis. At L5-S1, there is a 1.8 mm disc protrusion and facet hypertrophy. This results in mild canal and moderate bilateral foraminal stenosis. There is mild degenerative change involving the sacroiliac joints bilaterally       There is a 16 mm sclerotic lesion. In the left iliac crest.       There is a possible intraperitoneal catheter present. Impression       1. Degenerative changes most marked at L4-5, at which level there is moderate to severe canal and bilateral foraminal stenosis. There appears to be an intraspinal catheter entering the spine at L4-5.   2. There is mild canal and moderate bilateral foraminal stenosis at L5-S1. .   3. There is mild-to-moderate canal and bilateral foraminal stenosis at L2-3.   4. There is degenerative change involving the sacroiliac joints bilaterally. 5. There is a 16 mm sclerotic lesion involving the left iliac crest.   6. There is a possible intraperitoneal catheter present. .                   **This report has been created using voice recognition software. It may contain minor errors which are inherent in voice recognition technology. **       Final report electronically signed by DR Mariam Polanco on 1/20/2022 11:55 AM         Past Medical History:   Diagnosis Date    ADHD (attention deficit hyperactivity disorder)     Bipolar disorder ([facility] [address]     Chiari malformation     Depression     Fibromyalgia     Pseudotumor cerebri        Past Surgical History:   Procedure Laterality Date    BACK INJECTION Bilateral 5/3/2022    bilateral sacroiliac joint injection.  performed by Jorgito Starr DO at [facility] Bilateral 8/17/2022    bilateral sacroiliac joint injection performed by Jorgito Starr DO at [address] [place] [address]  2007    OTHER SURGICAL HISTORY  2007    lumbar shunt/ shunt    OTHER SURGICAL HISTORY      chiari decompression    PAIN MANAGEMENT PROCEDURE N/A 6/28/2022    lumbar epidural steroid injection Lumbar 4-5 performed by Jorgito Starr DO at [facility] N/A 11/29/2022    lumbar epidural steroid injection Lumbar 4-5 performed by Jorgito Starr DO at [address] [place] [address]         Family History   Problem Relation Age of Onset    Hypertension Mother     Alzheimer's Disease Father Stroke Father     Heart Disease Father        Medications & Allergies:   Current Outpatient Medications   Medication Instructions    amphetamine-dextroamphetamine (ADDERALL XR) 15 MG extended release capsule 15 mg, Oral, EVERY MORNING, (Take every morning for concentration)    cloNIDine (CATAPRES) 0.1 mg, Oral, 2 TIMES DAILY, (Take 1 tablet in the morning and 1 tablet at bedtime for anxiety)    hydrOXYzine pamoate (VISTARIL) 50 mg, Oral, 2 TIMES DAILY PRN    lamoTRIgine (LAMICTAL) 200 mg, Oral, DAILY    Loratadine (CLARITIN PO) Take by mouth    pseudoephedrine (SUDAFED) 30 mg, EVERY 4 HOURS PRN       No Known Allergies    Review of Systems:   Constitutional: negative for weight changes or fevers  Genitourinary: negative for bowel/bladder incontinence   Musculoskeletal: positive for low back pain, right leg pain  Neurological: negative for any leg weakness or numbness/tingling  Behavioral/Psych: negative for anxiety/depression   All other systems reviewed and are negative    Objective:     Vitals:    01/03/23 0824   BP: 96/74     Constitutional: Pleasant, no acute distress   Head: Normocephalic, atraumatic   Eyes: Conjunctivae normal   Neck: Supple, symmetrical   Lungs: Normal respiratory effort, non-labored breathing   Cardiovascular: Limbs warm and well perfused   Abdomen: Non-protruded   Musculoskeletal: Muscle bulk symmetric, no atrophy, no gross deformities   · Lower Extremities: ROM WNL. · Thorax: No paraspinal tenderness bilaterally. No scoliosis or kyphosis. · Lumbar Spine: ROM WNL. Lumbar paraspinals mildly tender to palpation bilaterally. SLR neg bilaterally. ROSSY positive bilaterally. GAENSLEN positive bilaterally. Positive SI joint distraction bilaterally. Positive facet loading bilaterally. Bilateral SI joints tender to palpation. Bilateral greater trochanters non-tender to palpation. Neurological: Cranial nerves II-XII grossly intact. · Gait - Antalgic gait.  Ambulates with assistive device (cane). · Motor: 3/5 muscle strength in bilateral hip flexion, knee flexion, knee extension, ankle dorsiflexion, and ankle plantar flexion (pain limited)  · Sensory: LT sensation intact in lower limbs, tingling through bilateral legs   · Reflexes: 2+ symmetrical in bilateral achilles, 2+ bilateral patellar, negative ankle clonus, downgoing babinski   Skin: No rashes or lesions present   Psychological: Cooperative, no exaggerated pain behaviors     Assessment:    Diagnosis Orders   1. Sacroiliac joint dysfunction of right side  MT DSTRJ NEUROLYTIC AGENT OTHER PERIPHERAL NERVE    CHG FLUOR NEEDLE/CATH SPINE/PARASPINAL DX/THER ADDON      2. Sacroiliac joint dysfunction of left side        3. Lumbar spondylosis        4. Chronic pain syndrome        5. Radiculopathy, lumbosacral region            Vicky Huggins is a 40 y. o.female presenting to the pain clinic for evaluation of low back and right leg pain. Patient's history and physical consistent with bilateral sacroiliac joint dysfunction. Responded significantly to SI joint injections in the past but she also gets repeat lumbar epidural steroid injections about every 3 months. To avoid the use of steroids and for more longstanding relief I have set her up for a right SI joint radiofrequency ablation. We will plan to move forward with a left SI RFA in the future. Plan: The following treatment recommendations and plan were discussed in detail with Vicky Huggins. Imaging:   I have reviewed patients imaging of lumbar CT and results were discussed with patient today. Analgesics:   Patient is taking Acetaminophen. Patient informed that the maximum amount of acetaminophen taken on a regular basis should only be 4000 mg per day. Adjuvants:   PROCEDURES ONLY    Interventions:    With examination consistent with bilateral sacroiliac dysfunction/pain, we will proceed with a bilateral sacroiliac joint radiofrequency ablation, starting with the RIGHT side first.  The risks and benefits were discussed in detail with the patient. Patient wants to proceed with the injection. Anticoagulation/NPO Recommendations:   Patient does not need to hold any medications prior to the procedure. Patient will need to be NPO x 8 hours prior to the procedure. We will use IV sedation. Multidisciplinary Pain Management:   In the presence of complex, chronic, and multi-factorial pain, the importance of a multidisciplinary approach to pain management in the patients management regimen was emphasized and discussed in great detail. PHYSICAL THERAPY: Patient is advised to see a physical therapist for gentle stretching exercises and conditioning exercises for management of pain.    PSYCHOLOGY: Patient is advised to see a clinical pain psychologist, for the psychosocial aspect of pain care through coping skills, relaxation strategies, cognitive group therapy etc.     Referrals:  None    Prescriptions Written This Visit:   None    Follow-up: SI BRANDI, in office 1 week after to set up left side RFA v JUVENTINO Lopes, APRN - CNP

## 2023-01-10 ENCOUNTER — TELEMEDICINE (OUTPATIENT)
Dept: PSYCHIATRY | Age: 38
End: 2023-01-10
Payer: MEDICARE

## 2023-01-10 DIAGNOSIS — F41.0 PANIC DISORDER: ICD-10-CM

## 2023-01-10 DIAGNOSIS — F31.9 BIPOLAR I DISORDER (HCC): ICD-10-CM

## 2023-01-10 DIAGNOSIS — R41.840 CONCENTRATION DEFICIT: Primary | ICD-10-CM

## 2023-01-10 DIAGNOSIS — F41.1 GENERALIZED ANXIETY DISORDER: ICD-10-CM

## 2023-01-10 PROCEDURE — G8428 CUR MEDS NOT DOCUMENT: HCPCS | Performed by: STUDENT IN AN ORGANIZED HEALTH CARE EDUCATION/TRAINING PROGRAM

## 2023-01-10 PROCEDURE — 99214 OFFICE O/P EST MOD 30 MIN: CPT | Performed by: STUDENT IN AN ORGANIZED HEALTH CARE EDUCATION/TRAINING PROGRAM

## 2023-01-10 PROCEDURE — G8417 CALC BMI ABV UP PARAM F/U: HCPCS | Performed by: STUDENT IN AN ORGANIZED HEALTH CARE EDUCATION/TRAINING PROGRAM

## 2023-01-10 PROCEDURE — 4004F PT TOBACCO SCREEN RCVD TLK: CPT | Performed by: STUDENT IN AN ORGANIZED HEALTH CARE EDUCATION/TRAINING PROGRAM

## 2023-01-10 PROCEDURE — G8484 FLU IMMUNIZE NO ADMIN: HCPCS | Performed by: STUDENT IN AN ORGANIZED HEALTH CARE EDUCATION/TRAINING PROGRAM

## 2023-01-10 RX ORDER — HYDROXYZINE PAMOATE 100 MG/1
100 CAPSULE ORAL 2 TIMES DAILY PRN
Qty: 60 CAPSULE | Refills: 0 | Status: SHIPPED | OUTPATIENT
Start: 2023-01-10

## 2023-01-10 NOTE — PROGRESS NOTES
[address]  [place] [address]  Dept: [phone]  Loc: [address] [facility]  1985  1/10/2023     F/U appt    TELEHEALTH EVALUATION -- Audio/Visual (During FKFKW-58 public health emergency)    Patient location: home  Physician location: home, [facility]  This virtual visit was conducted via interactive, real-time video. DSM:  Dx:    ICD-10-CM    1. Concentration deficit  R41.840       2. Bipolar I disorder ([facility])  F31.9       3. Generalized anxiety disorder  F41.1       4. Panic disorder  F41.0            Interim Hx:  Chief Complaint:   Chief Complaint   Patient presents with    Anxiety    Follow-up        HPI: Patient is a 40year old female who presents for follow up. Anxiety has improved with clonidine. She continues to endorse mild depression but reports feeling \"a lot better\". She reports compliance and denies side effects. Energy and motivation have improved. Patient reports improved focus and concentration with adderall. Denies active and passive SI, HI, AH and VH. Sleep initiation remains poor and patient is agreeable to increasing the dose of hydroxyzine. ROS:   Gen: Denies F/C/N/V  HEENT: Denies Vision/hearing changes/sore throat  CV: Denies CP/Palp  Pulm: Denies SOB/Cough/Wheeze  GI: Denies Abd pain  Skin: Denies Rashes/Lumps/Bruises  Neurologic: Denies changes in memory/speech/mental status.  Denies h/o seizures/DTs   Psychiatric: +anxiety +concentration deficit     MSE:  Cognition - Alert and Oriented x 4  Appearance - dressed casually and appropriately for the weather  Behavior - pleasant and cooperative  Motor - no tardive dyskinesia or other EPS observed; no psychomotor agitation/retardation observed  Gait and Station - unable to assess via tele-psychiatry  Speech - normal rate, rhythm, and volume  Eye Contact - maintains  Mood - \"a lot better\"  Affect - congruent  Thought Content/Perceptions:   Psychosis - denies AH/VH/delusions   SI/HI -  denies HI; denies SI  Thought Process/Associations -  logical and linear  Memory - grossly intact  Insight - good  Judgment - good        Diagnosis Orders   1. Concentration deficit        2. Bipolar I disorder ([facility] [address]        3. Generalized anxiety disorder        4. Panic disorder             Plan:  -Continue lamotrigine 200 mg daily for mood. Risk of Nadira Divine Syndrome was discussed.  -Increase hydroxyzine 50 mg bid prn to 100 mg bid prn for sleep/anxiety. Instructed not to drive or operate heavy machinery soon after taking this medication due to potential sedation.  -Continue clonidine 0.1 mg bid for anxiety. Instructed to discontinue medication if patient feels lightheaded or dizzy.  -Continue adderall XR 15 mg qam for concentration deficit, which patient reports had efficacy in the past. Risks including but not limited to hypertension, addictive potential, and increased risk of stroke/MI were discussed. OARRS reviewed. -Pt reports being adherent to medications and encouraged to continue     -Discussed medications with the patient and changes are as noted above with patient agreement.  -Discussed diagnosis and treatment plan with the patient  -Provided brief supportive therapy through active listening     -Patient Education: Discussed medications including indications, potential side effects, contraindications, and risks/benefits; also discussed alternative medications/treatments. Discussed the potential need for follow up laboratory studies related to medications and patient demonstrated understanding and compliance. Patient participated in medications decision making was given the opportunity to ask questions/express preferences and concerns. Patient demonstrates good understanding of medications and is compliant/in agreement with current plan. -The patient is a female in her childbearing years.  She verbalized understanding that there are no medications that are completely safe in pregnancy or while breastfeeding. She states she does not believe that she is pregnant now and agrees to contact her prescribing psychiatrist immediately if she is pregnant or planning to become pregnant.      -Referral sent for psychotherapy  -Referral sent for ADHD testing    -RTC in (4) weeks, sooner if needed  -Labs: n/a  -Discussed abstaining from drugs/alcohol  -Continue to work with /therapist  -Patient informed to call crisis line/911 or go to ER if experiencing crisis, SI, HI, or psychosis. [facility], was evaluated through a synchronous (real-time) audio-video encounter. The patient (or guardian if applicable) is aware that this is a billable service, which includes applicable copays. This virtual visit was conducted with patient's (and/or legal guardian's) consent. The visit was conducted pursuant to the emergency declaration under the [address] [place] [address] authority and the [employer] General Act. Patient identification was verified, and a caregiver was present when appropriate. The patient was located in a state where the provider was licensed to provide care.       Total time spent for this encounter: 30 minutes    Electronically signed by Padmini Bro DO on 1/10/2023 at 11:40 AM       Padmini Bro DO  1/10/2023

## 2023-01-13 ENCOUNTER — PREP FOR PROCEDURE (OUTPATIENT)
Dept: PHYSICAL MEDICINE AND REHAB | Age: 38
End: 2023-01-13

## 2023-01-16 NOTE — DISCHARGE INSTRUCTIONS

## 2023-01-17 NOTE — H&P
Today, patient presents for planned sacroiliac joint radiofrequency ablation, RIGHT side    This note is reflective of the patient's previous visit for evaluation. We will proceed with today's planned procedure. Since patient's last visit for evaluation, there have been no interval changes in medical history. Patient has no new numbness, weakness, or focal neurological deficit since evaluation. Patient has no contraindications to injection (no anticoagulation or recent antibiotic intake for active infections), and has a  present or is able to drive themselves (as discussed and cleared by physician). Allergies to latex, contrast dye, and steroid medications have been confirmed with the patient prior to the procedure. NPO necessity has been assessed and accepted based on procedure complexity. The risks and benefits of the procedure have been explained including but are not limited to infection, bleeding, paralysis, immediate post procedure weakness, and dizziness; the patient acknowledges understanding and desires to proceed with the procedure. Patient has signed consent for same procedure as discussed in previous clinic encounter. All other questions and concerns were addressed at bedside. See procedure note for full details. Post procedure Instructions: The patient was advised not to drive during the day of the procedure and not to engage in any significant decision making (unless otherwise states by physician). The patient was also advised to be cautious with walking/activity for 24 hours following today's visit and asked not to engage in over-exertion (unless otherwise states by physician). After this time, it is ok to resume pre-procedure level of activity. Patient advised to apply ice to site of injection in situations of pain and discomfort. Patient advised to not submerge site of injection during bath or pool activities for approximately 24 hours post-procedure.  Patient attested to understanding post procedure directions / restrictions. All other questions and concerns addressed before patient discharge in ambulatory fashion. Chronic Pain/PM&R Clinic Note     Encounter Date: 1/3/23    Subjective:   Chief Complaint:   No chief complaint on file. History of Present Illness:   Rashel Sanchez is a 40 y.o. female seen in the clinic initially on 04/11/2022 upon request from [place], [place]  for her history of low back pain. States she has had low back pain for several years but it has been getting significantly worse in the last year, especially since October 2021. Patient denies any prior events that may have caused aggravation of her pain. Patient states she tries to stay active but has been very difficult due to her pain level. She states she hears grinding in her right low back. She states pain is mainly located right low back radiating down her right leg to her right knee. She will occasionally get pain that shoots down to her right foot, this is dependent on how active she is. She has recently noticed some pain in her left low back radiating into her left leg. Patient states her pain is worse with increased activity and better at rest.  Patient states she uses a cane to help take the pressure off her back. When she is out shopping she has to use a cart to help take the pressure off her back. Patient currently walks with a cane outside of the home. She states she did fall last week when crouching down under a desk and lost her balance. She does state she feels like she is off balance frequently. Patient states she has trouble sleeping because she cannot lay in 1 position for very long without having pain. Patient states she will sometimes get pressure in her right hip and it feels like it is swollen. Patient states she did physical therapy a few years back which was ineffective and almost made things worse.   Patient denies any bowel/bladder incontinence or saddle anesthesia.  Patient currently moved back here from [place] within the last few months and she has yet to get established with a family doctor.  She currently lives at home with her 3 kids ranging from ages 12-17.  She is currently working online to get her masters degree.    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  She currently has a ventriculoperitoneal shunt, but it is shut off.  She also has a history of Chiari malformation and decompression.  She was previously seeing a specialist in [place] but has not been there since 2019.    Today, 01/03/2023, patient presents for planned follow up on chronic low back and leg pain. Patient underwent a lumbar epidural steroid injection at L4/5 on 11/29/2022 and reports significant relief with this injection.  She has now started to notice her bilateral SI joint pain, right significantly worse than the left.  She is questioning doing the SI radiofrequency ablation for more longstanding relief without use of steroids.  She is questioning if she can set up a lumbar epidural after completion of the SI RFA, since last time she had to wait over 3 months to have the repeat epidural.  She denies any new symptoms such as new leg paresthesias, leg weakness, saddle anesthesia, bowel/bladder incontinence.    History of Interventions:   Surgery: No previous lumbar surgeries  Injections:   B/L SI joint injection (05/03/2022) - relief in SI pain (increased midline low back pain)  LESI L4-5 (06/28/2022, 11/29/2022) - effective  B/L SI joint injection (08/17/2022) - effective x 3 months    Current Treatment Medications:   Ibuprofen - relatively ineffective   Tylenol - ineffecitve     Historical Treatment Medications:   Flexeril - ineffective  Baclofen - ineffective   Gabapentin - helps a little. 100mg TID   Lyrica - s/e  Cymbalta - fibromyalgia, withdrawal was terrible  Amitriptyline - depression - bipolar - not recently.   Zanaflex -  ineffective   Norco  Mobic - upset stomach, ineffective    Imaging:  CT Lumbar (01/20/2022)  Narrative   PROCEDURE: CT LUMBAR SPINE W CONTRAST       CLINICAL INFORMATION: DDD (degenerative disc disease), lumbosacral, Bilateral stenosis of lateral recess of lumbar spine, Lumbar radiculopathy. COMPARISON: Intra-articular administration of contrast performed on the same day. Plain radiographs dated 21 March 2010. Nacho Hilt TECHNIQUE: 3 mm axial images were obtained of the lumbar spine following intrathecal administration of 18 mL of Omnipaque 180. Sagittal and coronal reconstructions were obtained. Angled images were reconstructed through the L3-4, L4-5 and L5-S1 disc    levels. All CT scans at this facility use dose modulation, iterative reconstruction, and/or weight-based dosing when appropriate to reduce radiation dose to as low as reasonably achievable. FINDINGS:               The lumbar vertebral bodies are normally aligned. There are no compression fractures. No pars defects are noted. There appears be an intraspinal catheter entering the spine at L4-5       There are no gross abnormalities within the spinal canal.       On the axial images, at T11-T12, T12-L1 and L1-L2, there is no significant disc herniation, canal or foraminal stenosis. At L2-3, there is a 2.6 mm disc protrusion. This results in mild-to-moderate canal and bilateral foraminal stenosis. At L3-4, there is no disc herniation, canal or foraminal stenosis. At L4-5, there is a 4 mm disc protrusion and facet hypertrophy this results in moderate severe canal and bilateral foraminal stenosis. At L5-S1, there is a 1.8 mm disc protrusion and facet hypertrophy. This results in mild canal and moderate bilateral foraminal stenosis. There is mild degenerative change involving the sacroiliac joints bilaterally       There is a 16 mm sclerotic lesion.  In the left iliac crest.       There is a possible intraperitoneal catheter present. Impression       1. Degenerative changes most marked at L4-5, at which level there is moderate to severe canal and bilateral foraminal stenosis. There appears to be an intraspinal catheter entering the spine at L4-5.   2. There is mild canal and moderate bilateral foraminal stenosis at L5-S1. .   3. There is mild-to-moderate canal and bilateral foraminal stenosis at L2-3.   4. There is degenerative change involving the sacroiliac joints bilaterally. 5. There is a 16 mm sclerotic lesion involving the left iliac crest.   6. There is a possible intraperitoneal catheter present. .                   **This report has been created using voice recognition software. It may contain minor errors which are inherent in voice recognition technology. **       Final report electronically signed by DR Molly Mccauley on 1/20/2022 11:55 AM         Past Medical History:   Diagnosis Date    ADHD (attention deficit hyperactivity disorder)     Bipolar disorder ([facility] [address]     Chiari malformation     Depression     Fibromyalgia     Pseudotumor cerebri        Past Surgical History:   Procedure Laterality Date    BACK INJECTION Bilateral 5/3/2022    bilateral sacroiliac joint injection.  performed by Scarlett De La Rosa DO at [facility] Bilateral 8/17/2022    bilateral sacroiliac joint injection performed by Scarlett De La Rosa DO at [facility]  2007    OTHER SURGICAL HISTORY  2007    lumbar shunt/ shunt    OTHER SURGICAL HISTORY      chiari decompression    PAIN MANAGEMENT PROCEDURE N/A 6/28/2022    lumbar epidural steroid injection Lumbar 4-5 performed by Scarlett De La Rosa DO at [facility] N/A 11/29/2022    lumbar epidural steroid injection Lumbar 4-5 performed by Scarlett De La Rosa DO at [address] [place] [address]         Family History   Problem Relation Age of Onset Hypertension Mother     Alzheimer's Disease Father     Stroke Father     Heart Disease Father        Medications & Allergies:   Current Outpatient Medications   Medication Instructions    amphetamine-dextroamphetamine (ADDERALL XR) 15 MG extended release capsule 15 mg, Oral, EVERY MORNING, (Take every morning for concentration)    cloNIDine (CATAPRES) 0.1 mg, Oral, 2 TIMES DAILY, (Take 1 tablet in the morning and 1 tablet at bedtime for anxiety)    hydrOXYzine pamoate (VISTARIL) 100 mg, Oral, 2 TIMES DAILY PRN, And/or sleep    lamoTRIgine (LAMICTAL) 200 mg, Oral, DAILY    Loratadine (CLARITIN PO) Take by mouth    pseudoephedrine (SUDAFED) 30 mg, EVERY 4 HOURS PRN       No Known Allergies    Review of Systems:   Constitutional: negative for weight changes or fevers  Genitourinary: negative for bowel/bladder incontinence   Musculoskeletal: positive for low back pain, right leg pain  Neurological: negative for any leg weakness or numbness/tingling  Behavioral/Psych: negative for anxiety/depression   All other systems reviewed and are negative    Objective: There were no vitals filed for this visit. Constitutional: Pleasant, no acute distress   Head: Normocephalic, atraumatic   Eyes: Conjunctivae normal   Neck: Supple, symmetrical   Lungs: Normal respiratory effort, non-labored breathing   Cardiovascular: Limbs warm and well perfused   Abdomen: Non-protruded   Musculoskeletal: Muscle bulk symmetric, no atrophy, no gross deformities   · Lower Extremities: ROM WNL. · Thorax: No paraspinal tenderness bilaterally. No scoliosis or kyphosis. · Lumbar Spine: ROM WNL. Lumbar paraspinals mildly tender to palpation bilaterally. SLR neg bilaterally. ROSSY positive bilaterally. GAENSLEN positive bilaterally. Positive SI joint distraction bilaterally. Positive facet loading bilaterally. Bilateral SI joints tender to palpation. Bilateral greater trochanters non-tender to palpation.    Neurological: Cranial nerves II-XII grossly intact. · Gait - Antalgic gait. Ambulates with assistive device (cane). · Motor: 3/5 muscle strength in bilateral hip flexion, knee flexion, knee extension, ankle dorsiflexion, and ankle plantar flexion (pain limited)  · Sensory: LT sensation intact in lower limbs, tingling through bilateral legs   · Reflexes: 2+ symmetrical in bilateral achilles, 2+ bilateral patellar, negative ankle clonus, downgoing babinski   Skin: No rashes or lesions present   Psychological: Cooperative, no exaggerated pain behaviors     Assessment:    Diagnosis Orders   1. Sacroiliac joint dysfunction of right side  WI DSTRJ NEUROLYTIC AGENT OTHER PERIPHERAL NERVE    CHG FLUOR NEEDLE/CATH SPINE/PARASPINAL DX/THER ADDON      2. Sacroiliac joint dysfunction of left side        3. Lumbar spondylosis        4. Chronic pain syndrome        5. Radiculopathy, lumbosacral region            Kevin Bach is a 40 y. o.female presenting to the pain clinic for evaluation of low back and right leg pain. Patient's history and physical consistent with bilateral sacroiliac joint dysfunction. Responded significantly to SI joint injections in the past but she also gets repeat lumbar epidural steroid injections about every 3 months. To avoid the use of steroids and for more longstanding relief I have set her up for a right SI joint radiofrequency ablation. We will plan to move forward with a left SI RFA in the future. Plan: The following treatment recommendations and plan were discussed in detail with Kevin Bach. Imaging:   I have reviewed patients imaging of lumbar CT and results were discussed with patient today. Analgesics:   Patient is taking Acetaminophen. Patient informed that the maximum amount of acetaminophen taken on a regular basis should only be 4000 mg per day. Adjuvants:   PROCEDURES ONLY    Interventions:    With examination consistent with bilateral sacroiliac dysfunction/pain, we will proceed with a bilateral sacroiliac joint radiofrequency ablation, starting with the RIGHT side first.  The risks and benefits were discussed in detail with the patient. Patient wants to proceed with the injection. Anticoagulation/NPO Recommendations:   Patient does not need to hold any medications prior to the procedure. Patient will need to be NPO x 8 hours prior to the procedure. We will use IV sedation. Multidisciplinary Pain Management:   In the presence of complex, chronic, and multi-factorial pain, the importance of a multidisciplinary approach to pain management in the patients management regimen was emphasized and discussed in great detail. PHYSICAL THERAPY: Patient is advised to see a physical therapist for gentle stretching exercises and conditioning exercises for management of pain.    PSYCHOLOGY: Patient is advised to see a clinical pain psychologist, for the psychosocial aspect of pain care through coping skills, relaxation strategies, cognitive group therapy etc.     Referrals:  None    Prescriptions Written This Visit:   None    Follow-up: SI RFA, in office 1 week after to set up left side RFA fortino JAUREGUI    [address] [place] [address],

## 2023-01-18 ENCOUNTER — APPOINTMENT (OUTPATIENT)
Dept: GENERAL RADIOLOGY | Age: 38
End: 2023-01-18
Attending: ANESTHESIOLOGY
Payer: MEDICARE

## 2023-01-18 ENCOUNTER — HOSPITAL ENCOUNTER (OUTPATIENT)
Age: 38
Setting detail: OUTPATIENT SURGERY
Discharge: HOME | End: 2023-01-18
Attending: ANESTHESIOLOGY | Admitting: ANESTHESIOLOGY
Payer: MEDICARE

## 2023-01-18 VITALS
BODY MASS INDEX: 39.78 KG/M2 | TEMPERATURE: 96.5 F | HEART RATE: 78 BPM | RESPIRATION RATE: 14 BRPM | DIASTOLIC BLOOD PRESSURE: 58 MMHG | WEIGHT: 233 LBS | HEIGHT: 64 IN | OXYGEN SATURATION: 98 % | SYSTOLIC BLOOD PRESSURE: 109 MMHG

## 2023-01-18 PROCEDURE — 2500000003 HC RX 250 WO HCPCS: Performed by: ANESTHESIOLOGY

## 2023-01-18 PROCEDURE — 99152 MOD SED SAME PHYS/QHP 5/>YRS: CPT | Performed by: ANESTHESIOLOGY

## 2023-01-18 PROCEDURE — 2709999900 HC NON-CHARGEABLE SUPPLY: Performed by: ANESTHESIOLOGY

## 2023-01-18 PROCEDURE — 3600000054 HC PAIN LEVEL 3 BASE: Performed by: ANESTHESIOLOGY

## 2023-01-18 PROCEDURE — 7100000010 HC PHASE II RECOVERY - FIRST 15 MIN: Performed by: ANESTHESIOLOGY

## 2023-01-18 PROCEDURE — 6360000002 HC RX W HCPCS: Performed by: ANESTHESIOLOGY

## 2023-01-18 PROCEDURE — 3600000055 HC PAIN LEVEL 3 ADDL 15 MIN: Performed by: ANESTHESIOLOGY

## 2023-01-18 PROCEDURE — 64625 RF ABLTJ NRV NRVTG SI JT: CPT | Performed by: ANESTHESIOLOGY

## 2023-01-18 PROCEDURE — 7100000011 HC PHASE II RECOVERY - ADDTL 15 MIN: Performed by: ANESTHESIOLOGY

## 2023-01-18 PROCEDURE — 3209999900 FLUORO FOR SURGICAL PROCEDURES

## 2023-01-18 RX ORDER — MIDAZOLAM HYDROCHLORIDE 1 MG/ML
INJECTION INTRAMUSCULAR; INTRAVENOUS PRN
Status: DISCONTINUED | OUTPATIENT
Start: 2023-01-18 | End: 2023-01-18 | Stop reason: ALTCHOICE

## 2023-01-18 RX ORDER — FENTANYL CITRATE 50 UG/ML
INJECTION, SOLUTION INTRAMUSCULAR; INTRAVENOUS PRN
Status: DISCONTINUED | OUTPATIENT
Start: 2023-01-18 | End: 2023-01-18 | Stop reason: ALTCHOICE

## 2023-01-18 RX ORDER — LIDOCAINE HYDROCHLORIDE 10 MG/ML
INJECTION, SOLUTION EPIDURAL; INFILTRATION; INTRACAUDAL; PERINEURAL PRN
Status: DISCONTINUED | OUTPATIENT
Start: 2023-01-18 | End: 2023-01-18 | Stop reason: ALTCHOICE

## 2023-01-18 RX ORDER — LIDOCAINE HYDROCHLORIDE 20 MG/ML
INJECTION, SOLUTION EPIDURAL; INFILTRATION; INTRACAUDAL; PERINEURAL PRN
Status: DISCONTINUED | OUTPATIENT
Start: 2023-01-18 | End: 2023-01-18 | Stop reason: ALTCHOICE

## 2023-01-18 ASSESSMENT — PAIN SCALES - GENERAL: PAINLEVEL_OUTOF10: 3

## 2023-01-18 ASSESSMENT — PAIN DESCRIPTION - DESCRIPTORS: DESCRIPTORS: ACHING

## 2023-01-18 ASSESSMENT — PAIN - FUNCTIONAL ASSESSMENT: PAIN_FUNCTIONAL_ASSESSMENT: 0-10

## 2023-01-18 NOTE — PROCEDURES
Pre-operative Diagnosis:  SI joint pain     Post-operative Diagnosis:  SI joint pain     Procedure: RIGHT SI joint thermal radiofrequency ablation     Procedure Description:  After having signed the informed consent, the patient was placed in the prone position. The patient's low back and buttocks was prepped with chloraprep solution, and draped in a sterile fashion. A total of 1 ml of 1% lidocaine were used to anesthetize the skin and underlying tissues. Coolief radiofrequency needles were introduced to the anatomic location of the L5 primary dorsal ramus at the junction of the superior articular process and sacral ala utilizing intermittent fluoroscopy, as well as the S1, S2, and S3 lateral branch nerves at the lateral border of the S1, S2, and S3 posterior/dorsal foramen. Motor stimulation at 2 volts was done to confirm no ablation of the ventral ramus at each level. 1 mL of 2% lidocaine was then injected slowly at each level. After waiting 60 seconds, ablation was performed utilizing a thermal radiofrequency generator at 80 degrees C for 1 minute and 30 seconds. The patient tolerated the procedure well, was transported to the recovery room and observed for 15 minutes and discharged in an ambulatory fashion. No immediate reported complications.      Procedural Complications: None  Estimated Blood Loss: 0 mL           IV sedation was used during the procedure:  - Moderate intravenous conscious sedation was supervised by Dr. Katarzyna Leon  - The patient was independently monitored by a Registered Nurse assigned to the procedure room  - Monitoring included automated blood pressure, continuous EKG, and continuous pulse oximetry  - The detailed conscious record is permanently stored in the [place] [id number]  - The following is the conscious sedation record:  Start Time: 10:38  End Time : 10:53  Duration: 15 minutes   Medications Administered: 3 mg Versed, 100 mcg Fentanyl        Jeni Stockton DO  Interventional Pain Management/PM&R   [facility] and [address] [place] [address]

## 2023-01-18 NOTE — POST SEDATION
[address] [address]  Sedation/Analgesia Post Sedation Record    Pt Name: Pro Blank  MRN: [id number]  YOB: 1985  Procedure Performed By: Vida Briceno DO  Primary Care Physician: No primary care provider on file.     POST-PROCEDURE    Physicians/Assistants: Vida Briceno DO  Procedure Performed: See Procedure Note   Sedation/Anesthesia: Versed and Fentanyl (See procedure note for amount and duration)  Estimated Blood Loss:     0  ml  Specimens Removed: None        Complications: None           Marlon Mota DO  Electronically signed 1/18/2023 at 12:27 PM

## 2023-01-18 NOTE — PROGRESS NOTES
1054: Patient to Phase II Recovery via bed in stable condition on room air. Patient denies any nausea or shortness of breath at this time. 1056: Patient requesting snack at this time. 1100: IV removed. No complications. 1104: Patient getting dressed. 1110: Patient discharged.

## 2023-01-18 NOTE — PRE SEDATION
[address] [address]  Pre-Sedation/Analgesia History & Physical    Pt Name: Noa Stearns  MRN: [id number]  YOB: 1985  Provider Performing Procedure: Nhi Howard DO   Primary Care Physician: No primary care provider on file. MEDICAL HISTORY       has a past medical history of ADHD (attention deficit hyperactivity disorder), Bipolar disorder ([address] Chiari malformation, Depression, Fibromyalgia, and Pseudotumor cerebri. SURGICAL HISTORY   has a past surgical history that includes Cholecystectomy (2007); other surgical history (2007); other surgical history; Ventriculoperitoneal shunt; Back Injection (Bilateral, 5/3/2022); Pain management procedure (N/A, 6/28/2022); Back Injection (Bilateral, 8/17/2022); and Pain management procedure (N/A, 11/29/2022). ALLERGIES   Allergies as of 01/03/2023    (No Known Allergies)       MEDICATIONS   Prior to Admission medications    Medication Sig Start Date End Date Taking? Authorizing Provider   hydrOXYzine pamoate (VISTARIL) 100 MG capsule Take 1 capsule by mouth 2 times daily as needed for Anxiety And/or sleep 1/10/23   Brenda Hutchison DO   amphetamine-dextroamphetamine (ADDERALL XR) 15 MG extended release capsule Take 1 capsule by mouth every morning for 30 days.  (Take every morning for concentration) Max Daily Amount: 15 mg 12/27/22 1/26/23  Brenda Hutchison DO   lamoTRIgine (LAMICTAL) 200 MG tablet Take 1 tablet by mouth daily 12/6/22   Brenda Hutchison DO   cloNIDine (CATAPRES) 0.1 MG tablet Take 1 tablet by mouth 2 times daily (Take 1 tablet in the morning and 1 tablet at bedtime for anxiety) 11/2/22   Brenda Hutchison DO   pseudoephedrine (SUDAFED) 30 MG tablet Take 30 mg by mouth every 4 hours as needed for Congestion  Patient not taking: No sig reported    Historical Provider, MD   Loratadine (CLARITIN PO) Take by mouth  Patient not taking: No sig reported    Historical Provider, MD     PHYSICAL:   Vitals:    01/18/23 1054   BP: (!) 109/58 Pulse: 78   Resp: 14   Temp: (!) 96.5 °F (35.8 °C)   SpO2: 98%     PLANNED PROCEDURE   See procedure note  SEDATION  Planned agent: Versed and Fentanyl  ASA Classification: 1  Class 1: A normal healthy patient  Class 2: Pt with mild to moderate systemic disease  Class 3: Severe systemic disease or disturbance  Class 4: Severe systemic disorders that are already life threatening. Class 5: Moribund pt with little chances of survival, for more than 24 hours. Mallampati I Airway Classification: 1    1. Pre-procedure diagnostic studies complete and results available. 2. Previous sedation/anesthesia experiences assessed. 3. The patient is an appropriate candidate to undergo the planned procedure sedation and anesthesia. (Refer to nursing sedation/analgesia documentation record)  4. Formulation and discussion of sedation/procedure plan, risks, and expectations with patient and/or responsible adult completed. 5. Patient examined immediately prior to the procedure.  (Refer to nursing sedation/analgesia documentation record)    Katarzyna Mcdowell DO  Electronically signed 1/18/2023 at 12:27 PM

## 2023-01-24 DIAGNOSIS — R41.840 CONCENTRATION DEFICIT: ICD-10-CM

## 2023-01-25 RX ORDER — DEXTROAMPHETAMINE SACCHARATE, AMPHETAMINE ASPARTATE MONOHYDRATE, DEXTROAMPHETAMINE SULFATE AND AMPHETAMINE SULFATE 3.75; 3.75; 3.75; 3.75 MG/1; MG/1; MG/1; MG/1
15 CAPSULE, EXTENDED RELEASE ORAL EVERY MORNING
Qty: 30 CAPSULE | Refills: 0 | Status: SHIPPED | OUTPATIENT
Start: 2023-01-25 | End: 2023-02-23 | Stop reason: ALTCHOICE

## 2023-01-25 NOTE — TELEPHONE ENCOUNTER
[facility] called requesting a refill on the following medications:  Requested Prescriptions     Pending Prescriptions Disp Refills    amphetamine-dextroamphetamine (ADDERALL XR) 15 MG extended release capsule 30 capsule 0     Sig: Take 1 capsule by mouth every morning for 30 days.  (Take every morning for concentration) Max Daily Amount: 15 mg       Date of last visit: 1/10/2023  Date of next visit (if applicable):2/7/2023  Pharm[facility] Name: [facility] [place]

## 2023-01-31 ENCOUNTER — OFFICE VISIT (OUTPATIENT)
Dept: PHYSICAL MEDICINE AND REHAB | Age: 38
End: 2023-01-31
Payer: MEDICARE

## 2023-01-31 VITALS
DIASTOLIC BLOOD PRESSURE: 78 MMHG | SYSTOLIC BLOOD PRESSURE: 118 MMHG | BODY MASS INDEX: 39.78 KG/M2 | HEIGHT: 64 IN | WEIGHT: 233 LBS

## 2023-01-31 DIAGNOSIS — M47.816 LUMBAR SPONDYLOSIS: ICD-10-CM

## 2023-01-31 DIAGNOSIS — M53.3 SACROILIAC JOINT DYSFUNCTION OF RIGHT SIDE: ICD-10-CM

## 2023-01-31 DIAGNOSIS — G89.4 CHRONIC PAIN SYNDROME: ICD-10-CM

## 2023-01-31 DIAGNOSIS — M79.2 NEUROPATHIC PAIN: ICD-10-CM

## 2023-01-31 DIAGNOSIS — M47.819 FACET ARTHROPATHY: ICD-10-CM

## 2023-01-31 DIAGNOSIS — M54.17 RADICULOPATHY, LUMBOSACRAL REGION: Primary | ICD-10-CM

## 2023-01-31 PROCEDURE — 99214 OFFICE O/P EST MOD 30 MIN: CPT | Performed by: NURSE PRACTITIONER

## 2023-01-31 PROCEDURE — G8417 CALC BMI ABV UP PARAM F/U: HCPCS | Performed by: NURSE PRACTITIONER

## 2023-01-31 PROCEDURE — G8484 FLU IMMUNIZE NO ADMIN: HCPCS | Performed by: NURSE PRACTITIONER

## 2023-01-31 PROCEDURE — G8427 DOCREV CUR MEDS BY ELIG CLIN: HCPCS | Performed by: NURSE PRACTITIONER

## 2023-01-31 PROCEDURE — 4004F PT TOBACCO SCREEN RCVD TLK: CPT | Performed by: NURSE PRACTITIONER

## 2023-01-31 RX ORDER — IBUPROFEN 800 MG/1
800 TABLET ORAL EVERY 8 HOURS PRN
Qty: 90 TABLET | Refills: 2 | Status: SHIPPED | OUTPATIENT
Start: 2023-01-31 | End: 2023-03-02

## 2023-01-31 RX ORDER — METHOCARBAMOL 500 MG/1
500 TABLET, FILM COATED ORAL NIGHTLY PRN
Qty: 30 TABLET | Refills: 0 | Status: SHIPPED | OUTPATIENT
Start: 2023-01-31 | End: 2023-03-02

## 2023-01-31 NOTE — PROGRESS NOTES
Chronic Pain/PM&R Clinic Note     Encounter Date: 1/31/23    Subjective:   Chief Complaint:   Chief Complaint   Patient presents with    Follow Up After Procedure     sacroiliac joint radiofrequency ablation,  RIGHT side firstsacroiliac joint radiofrequency ablation,  RIGHT side first  Did help in part done    Back Pain       History of Present Illness:   Marti Ziegler is a 40 y.o. female seen in the clinic initially on 04/11/2022 upon request from [place], [place]  for her history of low back pain. States she has had low back pain for several years but it has been getting significantly worse in the last year, especially since October 2021. Patient denies any prior events that may have caused aggravation of her pain. Patient states she tries to stay active but has been very difficult due to her pain level. She states she hears grinding in her right low back. She states pain is mainly located right low back radiating down her right leg to her right knee. She will occasionally get pain that shoots down to her right foot, this is dependent on how active she is. She has recently noticed some pain in her left low back radiating into her left leg. Patient states her pain is worse with increased activity and better at rest.  Patient states she uses a cane to help take the pressure off her back. When she is out shopping she has to use a cart to help take the pressure off her back. Patient currently walks with a cane outside of the home. She states she did fall last week when crouching down under a desk and lost her balance. She does state she feels like she is off balance frequently. Patient states she has trouble sleeping because she cannot lay in 1 position for very long without having pain. Patient states she will sometimes get pressure in her right hip and it feels like it is swollen. Patient states she did physical therapy a few years back which was ineffective and almost made things worse. Patient denies any bowel/bladder incontinence or saddle anesthesia. Patient currently moved back here from [place] within the last few months and she has yet to get established with a family doctor. She currently lives at home with her 3 kids ranging from ages 14-13. She is currently working online to get her masters degree. 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 She currently has a ventriculoperitoneal shunt, but it is shut off. She also has a history of Chiari malformation and decompression. She was previously seeing a specialist in [place] but has not been there since 2019. Today, 01/31/2023, patient presents for planned follow up on chronic pain. She underwent the right SI joint RFA on 01/18/2023. She has noticed significant improvement in her right SI joint pain. She has noticed improvement with walking. She states the pain in her low back has returned and she has been having some swelling of her right low back. She states the swelling is not new to her. She had a long car ride to [place] a few weeks ago and noticed increased swelling, the swelling has gone down but has not resolved completely. She has been using heat and taking Ibuprofen which seems to help. She does not use ice due to a bad flare with ice in the past. She denies any new leg weakness, saddle anesthesia, or bowel/bladder incontinence. History of Interventions:   Surgery: No previous lumbar surgeries  Injections:   B/L SI joint injection (05/03/2022) - relief in SI pain (increased midline low back pain)  LESI L4-5 (06/28/2022, 11/29/2022) - effective  B/L SI joint injection (08/17/2022) - effective x 3 months  Right SI RFA (01/18/2023) - effective    Current Treatment Medications:   Ibuprofen 800mg OTC- relatively ineffective   Tylenol - ineffecitve     Historical Treatment Medications:   Flexeril - ineffective  Baclofen - ineffective   Gabapentin - helps a little.  100mg TID   Lyrica - s/e  Cymbalta - fibromyalgia, withdrawal was terrible  Amitriptyline - depression - bipolar - not recently. Zanaflex - ineffective   Norco  Mobic - upset stomach, ineffective    Imaging:  CT Lumbar (01/20/2022)  Narrative   PROCEDURE: CT LUMBAR SPINE W CONTRAST       CLINICAL INFORMATION: DDD (degenerative disc disease), lumbosacral, Bilateral stenosis of lateral recess of lumbar spine, Lumbar radiculopathy. COMPARISON: Intra-articular administration of contrast performed on the same day. Plain radiographs dated 21 March 2010. Edis Bun TECHNIQUE: 3 mm axial images were obtained of the lumbar spine following intrathecal administration of 18 mL of Omnipaque 180. Sagittal and coronal reconstructions were obtained. Angled images were reconstructed through the L3-4, L4-5 and L5-S1 disc    levels. All CT scans at this facility use dose modulation, iterative reconstruction, and/or weight-based dosing when appropriate to reduce radiation dose to as low as reasonably achievable. FINDINGS:               The lumbar vertebral bodies are normally aligned. There are no compression fractures. No pars defects are noted. There appears be an intraspinal catheter entering the spine at L4-5       There are no gross abnormalities within the spinal canal.       On the axial images, at T11-T12, T12-L1 and L1-L2, there is no significant disc herniation, canal or foraminal stenosis. At L2-3, there is a 2.6 mm disc protrusion. This results in mild-to-moderate canal and bilateral foraminal stenosis. At L3-4, there is no disc herniation, canal or foraminal stenosis. At L4-5, there is a 4 mm disc protrusion and facet hypertrophy this results in moderate severe canal and bilateral foraminal stenosis. At L5-S1, there is a 1.8 mm disc protrusion and facet hypertrophy. This results in mild canal and moderate bilateral foraminal stenosis.        There is mild degenerative change involving the sacroiliac joints bilaterally       There is a 16 mm sclerotic lesion. In the left iliac crest.       There is a possible intraperitoneal catheter present. Impression       1. Degenerative changes most marked at L4-5, at which level there is moderate to severe canal and bilateral foraminal stenosis. There appears to be an intraspinal catheter entering the spine at L4-5.   2. There is mild canal and moderate bilateral foraminal stenosis at L5-S1. .   3. There is mild-to-moderate canal and bilateral foraminal stenosis at L2-3.   4. There is degenerative change involving the sacroiliac joints bilaterally. 5. There is a 16 mm sclerotic lesion involving the left iliac crest.   6. There is a possible intraperitoneal catheter present. .                   **This report has been created using voice recognition software. It may contain minor errors which are inherent in voice recognition technology. **       Final report electronically signed by DR Nicole Reyes on 1/20/2022 11:55 AM         Past Medical History:   Diagnosis Date    ADHD (attention deficit hyperactivity disorder)     Bipolar disorder ([facility] [address]     Chiari malformation     Depression     Fibromyalgia     Pseudotumor cerebri        Past Surgical History:   Procedure Laterality Date    BACK INJECTION Bilateral 5/3/2022    bilateral sacroiliac joint injection.  performed by Fidencio Field DO at [facility] Bilateral 8/17/2022    bilateral sacroiliac joint injection performed by Fidencio Field DO at [address] [place] [address]  2007    OTHER SURGICAL HISTORY  2007    lumbar shunt/ shunt    OTHER SURGICAL HISTORY      chiari decompression    PAIN MANAGEMENT PROCEDURE N/A 6/28/2022    lumbar epidural steroid injection Lumbar 4-5 performed by Fidencio Field DO at [facility] N/A 11/29/2022    lumbar epidural steroid injection Lumbar 4-5 performed by Fidencio Field DO at [facility] Right 1/18/2023    sacroiliac joint radiofrequency ablation,  RIGHT side first performed by Joselo Land DO at [facility]         Family History   Problem Relation Age of Onset    Hypertension Mother     Alzheimer's Disease Father     Stroke Father     Heart Disease Father        Medications & Allergies:   Current Outpatient Medications   Medication Instructions    amphetamine-dextroamphetamine (ADDERALL XR) 15 MG extended release capsule 15 mg, Oral, EVERY MORNING, (Take every morning for concentration)    cloNIDine (CATAPRES) 0.1 mg, Oral, 2 TIMES DAILY, (Take 1 tablet in the morning and 1 tablet at bedtime for anxiety)    hydrOXYzine pamoate (VISTARIL) 100 mg, Oral, 2 TIMES DAILY PRN, And/or sleep    ibuprofen (ADVIL;MOTRIN) 800 mg, Oral, EVERY 8 HOURS PRN    lamoTRIgine (LAMICTAL) 200 mg, Oral, DAILY    methocarbamol (ROBAXIN) 500 mg, Oral, NIGHTLY PRN       No Known Allergies    Review of Systems:   Constitutional: negative for weight changes or fevers  Genitourinary: negative for bowel/bladder incontinence   Musculoskeletal: positive for low back pain, right leg pain  Neurological: negative for any leg weakness or numbness/tingling  Behavioral/Psych: negative for anxiety/depression   All other systems reviewed and are negative    Objective:     Vitals:    01/31/23 0751   BP: 118/78     Constitutional: Pleasant, no acute distress   Head: Normocephalic, atraumatic   Eyes: Conjunctivae normal   Neck: Supple, symmetrical   Lungs: Normal respiratory effort, non-labored breathing   Cardiovascular: Limbs warm and well perfused   Abdomen: Non-protruded   Musculoskeletal: Muscle bulk symmetric, no atrophy, no gross deformities   · Lower Extremities: ROM WNL. · Thorax: No paraspinal tenderness bilaterally. No scoliosis or kyphosis. · Lumbar Spine: ROM WNL.  Lumbar paraspinals mildly tender to palpation bilaterally, right great than left. SLR positive bilaterally. ROSSY positive bilaterally (improved). GAENSLEN positive bilaterally (improved). Positive SI joint distraction bilaterally (improved). Positive facet loading bilaterally. Bilateral SI joints tender to palpation (improved). Bilateral greater trochanters non-tender to palpation. Neurological: Cranial nerves II-XII grossly intact. · Gait - Antalgic gait. Ambulates with assistive device (cane). · Motor: 3/5 muscle strength in bilateral hip flexion, knee flexion, knee extension, ankle dorsiflexion, and ankle plantar flexion (pain limited)  · Sensory: LT sensation intact in lower limbs, tingling through bilateral legs   · Reflexes: 2+ symmetrical in bilateral achilles, 2+ bilateral patellar, negative ankle clonus, downgoing babinski   Skin: No rashes or lesions present   Psychological: Cooperative, no exaggerated pain behaviors     Assessment:    Diagnosis Orders   1. Radiculopathy, lumbosacral region  CHG FLUOR NEEDLE/CATH SPINE/PARASPINAL DX/THER ADDON    WI NJX DX/THER SBST INTRLMNR LMBR/SAC W/IMG GDN      2. Sacroiliac joint dysfunction of right side        3. Lumbar spondylosis        4. Facet arthropathy        5. Neuropathic pain        6. Chronic pain syndrome              Toney Prakash is a 40 y. o.female presenting to the pain clinic for evaluation of low back and right leg pain. Patient's history and physical consistent with bilateral sacroiliac joint dysfunction as well as lumbar radiculopathy. Responded significantly to right SI RFA two weeks ago and continues to notice slow improvement. She has responded well to lumbar epidruals in the past. I have set her up for a lumbar epidural steroid injection at L4-5. We will plan to move forward with a left SI RFA in the future. I have sent a prescription for ibuprofen 800 mg 2-3 times daily PRN. I also started her on robaxin 500 mg nightly. Plan:    The following treatment recommendations and plan were discussed in detail with Vin Montes. Imaging:   I have reviewed patients imaging of lumbar CT and results were discussed with patient today. Analgesics:   Patient is taking Acetaminophen. Patient informed that the maximum amount of acetaminophen taken on a regular basis should only be 4000 mg per day. Adjuvants:   PROCEDURES ONLY    Interventions: In presence of lumbosacral radiating pain, the option of lumbar epidural steroid injection approach at L4-5 was chosen. The risks and benefits were discussed in detail with the patient. Patient wants to proceed with the injection. Anticoagulation/NPO Recommendations:   Patient does need to hold Ibuprofen x 2 days prior to the procedure. Patient does need to be NPO x8 hours prior to the procedure. We will start an IV for the procedure  Patient will need a     Multidisciplinary Pain Management:   In the presence of complex, chronic, and multi-factorial pain, the importance of a multidisciplinary approach to pain management in the patients management regimen was emphasized and discussed in great detail. PHYSICAL THERAPY: Patient is advised to see a physical therapist for gentle stretching exercises and conditioning exercises for management of pain.    PSYCHOLOGY: Patient is advised to see a clinical pain psychologist, for the psychosocial aspect of pain care through coping skills, relaxation strategies, cognitive group therapy etc.     Referrals:  None    Prescriptions Written This Visit:   Robaxin 500 mg (#30, 0 refills)    Follow-up: JUVENTINO, in office 4 weeks after    LIBAN Estrella CNP

## 2023-02-07 ENCOUNTER — OFFICE VISIT (OUTPATIENT)
Dept: PSYCHIATRY | Age: 38
End: 2023-02-07
Payer: MEDICARE

## 2023-02-07 DIAGNOSIS — R41.840 CONCENTRATION DEFICIT: Primary | ICD-10-CM

## 2023-02-07 DIAGNOSIS — F41.0 PANIC DISORDER: ICD-10-CM

## 2023-02-07 DIAGNOSIS — F31.9 BIPOLAR I DISORDER (HCC): ICD-10-CM

## 2023-02-07 DIAGNOSIS — F41.1 GENERALIZED ANXIETY DISORDER: ICD-10-CM

## 2023-02-07 PROCEDURE — G8417 CALC BMI ABV UP PARAM F/U: HCPCS | Performed by: STUDENT IN AN ORGANIZED HEALTH CARE EDUCATION/TRAINING PROGRAM

## 2023-02-07 PROCEDURE — G8428 CUR MEDS NOT DOCUMENT: HCPCS | Performed by: STUDENT IN AN ORGANIZED HEALTH CARE EDUCATION/TRAINING PROGRAM

## 2023-02-07 PROCEDURE — 4004F PT TOBACCO SCREEN RCVD TLK: CPT | Performed by: STUDENT IN AN ORGANIZED HEALTH CARE EDUCATION/TRAINING PROGRAM

## 2023-02-07 PROCEDURE — G8484 FLU IMMUNIZE NO ADMIN: HCPCS | Performed by: STUDENT IN AN ORGANIZED HEALTH CARE EDUCATION/TRAINING PROGRAM

## 2023-02-07 PROCEDURE — 99214 OFFICE O/P EST MOD 30 MIN: CPT | Performed by: STUDENT IN AN ORGANIZED HEALTH CARE EDUCATION/TRAINING PROGRAM

## 2023-02-07 NOTE — PROGRESS NOTES
[address] [address]  Dept: [phone]  Loc: [address] [facility]  1985 2/7/2023     F/U appt    DSM:  Dx:    ICD-10-CM    1. Concentration deficit  R41.840       2. Bipolar I disorder ([address]  F31.9       3. Generalized anxiety disorder  F41.1       4. Panic disorder  F41.0            Interim Hx:  Chief Complaint:   Chief Complaint   Patient presents with    Follow-up    Medication Check    Medication Refill      HPI: Patient is a 40year old female who presents for follow up. Anxiety has improved with clonidine. Depression is stable. She reports compliance and denies side effects. Energy and motivation have improved. Patient reports improved focus and concentration with adderall. Denies active and passive SI, HI, AH and VH. Hydroxyzine has improved sleep. Denies acute concerns. Insurance will no longer cover adderall XR so formulation will be changed to IR. ROS:   Gen: Denies F/C/N/V  HEENT: Denies Vision/hearing changes/sore throat  CV: Denies CP/Palp  Pulm: Denies SOB/Cough/Wheeze  GI: Denies Abd pain  Skin: Denies Rashes/Lumps/Bruises  Neurologic: Denies changes in memory/speech/mental status.  Denies h/o seizures/DTs   Psychiatric: denies depression and anxiety     MSE:  Cognition - Alert and Oriented x 4  Appearance - dressed casually and appropriately for the weather  Behavior - pleasant and cooperative  Motor - no tardive dyskinesia or other EPS observed; no psychomotor agitation/retardation observed  Gait and Station - unable to assess via tele-psychiatry  Speech - normal rate, rhythm, and volume  Eye Contact - maintains  Mood - \"better\"  Affect - congruent  Thought Content/Perceptions:   Psychosis - denies AH/VH/delusions   SI/HI -  denies HI; denies SI  Thought Process/Associations -  logical and linear  Memory - grossly intact  Insight - good  Judgment - good       Diagnosis Orders   1. Concentration deficit        2. Bipolar I disorder ([facility] [address]        3. Generalized anxiety disorder        4. Panic disorder             Plan:  -Continue lamotrigine 200 mg daily for mood. Risk of Aldean Castle Syndrome was discussed.  -Continue hydroxyzine 100 mg bid prn for sleep/anxiety. Instructed not to drive or operate heavy machinery soon after taking this medication due to potential sedation.  -Continue clonidine 0.1 mg bid for anxiety. Instructed to discontinue medication if patient feels lightheaded or dizzy.  -Change adderall XR 15 mg qam to adderall IR 15 mg qam for concentration deficit due to insurance issues. Risks including but not limited to hypertension, addictive potential, and increased risk of stroke/MI were discussed. OARRS reviewed. -Pt reports being adherent to medications and encouraged to continue     -Discussed medications with the patient and changes are as noted above with patient agreement.  -Discussed diagnosis and treatment plan with the patient  -Provided brief supportive therapy through active listening     -Patient Education: Discussed medications including indications, potential side effects, contraindications, and risks/benefits; also discussed alternative medications/treatments. Discussed the potential need for follow up laboratory studies related to medications and patient demonstrated understanding and compliance. Patient participated in medications decision making was given the opportunity to ask questions/express preferences and concerns. Patient demonstrates good understanding of medications and is compliant/in agreement with current plan. -The patient is a female in her childbearing years. She verbalized understanding that there are no medications that are completely safe in pregnancy or while breastfeeding.  She states she does not believe that she is pregnant now and agrees to contact her prescribing psychiatrist immediately if she is pregnant or planning to become pregnant.      -Referral sent for psychotherapy  -Referral sent for ADHD testing    -RTC in (4) weeks, sooner if needed  -Labs: n/a  -Discussed abstaining from drugs/alcohol  -Continue to work with /therapist  -Patient informed to call crisis line/911 or go to ER if experiencing crisis, SI, HI, or psychosis.       Lonnie Yates,   2/7/2023

## 2023-02-13 NOTE — DISCHARGE INSTRUCTIONS
Post procedure Instructions:    No driving or making significant decisions for the remainder of the day. Be cautions with walking and activity for 24 hours, do not over exert yourself. Ok to resume pre-procedure activity level today. Apply ice to site of injection site if you have pain or discomfort. Do not submerge sit of injection during bath or pool activities for 48 hours post-procedure. Resume blood thinning medications in 24 hours. Call office [phone] if you have:  Temperature greater than 100.4  Persistent nausea and vomiting  Severe uncontrolled pain  Redness, tenderness, or signs of infection (pain, swelling, redness, odor or green/yellow discharge around the site)  Difficulty breathing, headache or visual disturbances  Hives  Persistent dizziness or light-headedness  Extreme fatigue  Any other questions or concerns you may have after discharge    In an emergency, call 911 or go to an Emergency Department at a nearby hospital    Surgical Site Infections      How can we work together to prevent Surgical Site Infections? We would like to thank you for choosing [facility] for your Surgical Care. Below you will find helpful information on how we can work together to prevent Surgical Site Infections. What is a Surgical Site Infection (SSI)? A surgical site infection is an infection that occurs after surgery in the part of the body where the surgery took place. Most patients who have surgery do not develop an infection. However, infections develop in about 1 to 3 out of every 100 patients who have surgery. Some of the common symptoms of a surgical site infection are:  Redness and pain around the area where you had surgery  Drainage of cloudy fluid from your surgical wound  Fever    Can SSIs be treated? Yes. Most surgical site infections can be treated with antibiotics. The antibiotic given to you depends on the bacteria (germs) causing the infection.  Sometimes patients with SSIs also need another surgery to treat the infection. What are some of the things that hospitals are doing to prevent SSIs? To prevent SSIs, doctors, nurses, and other healthcare providers: May remove some of your hair immediately before your surgery using electric clippers if the hair is in the same area where the procedure will occur. They should not shave you with a razor. Give you antibiotics before your surgery starts. In most cases, you should get antibiotics within 60 minutes before the surgery starts and the antibiotics should be stopped within 24 hours after surgery. Clean the skin at the site of your surgery with a special soap that kills germs. Clean their hands and arms up to their elbows with an antiseptic agent just before the surgery. Wear special hair covers, masks, gowns, and gloves during surgery to  keep the surgery area clean. Clean their hands with soap and water or an alcohol-based hand rub before and after caring for each patient. If you do not see your providers clean their hands, please     ask  them to do so. What can I do to help prevent SSIs? Before your surgery:  Tell your doctor about other medical problems you may have. Health problems such as allergies, diabetes, and obesity could affect your surgery and your treatment. Quit smoking. Patients who smoke get more infections. Talk to your doctor about how you can quit before your surgery. Do not shave near where you will have surgery. Shaving with a razor can irritate your skin and make it easier to develop an infection. At the time of your surgery:  Speak up if someone tries to shave you with a razor before surgery. Ask why you need to be shaved and talk with your surgeon if you have any concerns. Ask if you will get antibiotics before surgery.   After your surgery:  Make sure that your healthcare providers clean their hands before examining you, either with soap and water or an alcohol-based hand rub.  Family and friends who visit you should not touch the surgical wound or dressings. Family and friends should clean their hands with soap and water or an alcohol-based hand rub before and after visiting you. If you do not see them clean their hands, ask them to clean their hands. What do I need to do when I go home from the hospital?  Before you go home, your doctor or nurse should explain everything you need to know about taking care of your wound. Make sure you understand how to care for your wound before you leave the hospital.  Always clean your hands before and after caring for your wound. Before you go home, make sure you know who to contact if you have questions or problems after you get home. If you have any symptoms of an infection, such as redness and pain at the surgery site, drainage, or fever, call your doctor immediately. If you have additional questions, please ask your doctor or nurse.

## 2023-02-13 NOTE — H&P
Today, patient presents for planned lumbar epidural steroid injection approach at L4-5    This note is reflective of the patient's previous visit for evaluation. We will proceed with today's planned procedure. Since patient's last visit for evaluation, there have been no interval changes in medical history. Patient has no new numbness, weakness, or focal neurological deficit since evaluation. Patient has no contraindications to injection (no anticoagulation or recent antibiotic intake for active infections), and has a  present or is able to drive themselves (as discussed and cleared by physician). Allergies to latex, contrast dye, and steroid medications have been confirmed with the patient prior to the procedure. NPO necessity has been assessed and accepted based on procedure complexity. The risks and benefits of the procedure have been explained including but are not limited to infection, bleeding, paralysis, immediate post procedure weakness, and dizziness; the patient acknowledges understanding and desires to proceed with the procedure. Patient has signed consent for same procedure as discussed in previous clinic encounter. All other questions and concerns were addressed at bedside. See procedure note for full details. Post procedure Instructions: The patient was advised not to drive during the day of the procedure and not to engage in any significant decision making (unless otherwise states by physician). The patient was also advised to be cautious with walking/activity for 24 hours following today's visit and asked not to engage in over-exertion (unless otherwise states by physician). After this time, it is ok to resume pre-procedure level of activity. Patient advised to apply ice to site of injection in situations of pain and discomfort. Patient advised to not submerge site of injection during bath or pool activities for approximately 24 hours post-procedure.  Patient attested to understanding post procedure directions / restrictions. All other questions and concerns addressed before patient discharge in ambulatory fashion. Chronic Pain/PM&R Clinic Note     Encounter Date: 1/31/23    Subjective:   Chief Complaint:   No chief complaint on file. History of Present Illness:   Millie Foster is a 45 y.o. female seen in the clinic initially on 04/11/2022 upon request from [place], [place]  for her history of low back pain. States she has had low back pain for several years but it has been getting significantly worse in the last year, especially since October 2021. Patient denies any prior events that may have caused aggravation of her pain. Patient states she tries to stay active but has been very difficult due to her pain level. She states she hears grinding in her right low back. She states pain is mainly located right low back radiating down her right leg to her right knee. She will occasionally get pain that shoots down to her right foot, this is dependent on how active she is. She has recently noticed some pain in her left low back radiating into her left leg. Patient states her pain is worse with increased activity and better at rest.  Patient states she uses a cane to help take the pressure off her back. When she is out shopping she has to use a cart to help take the pressure off her back. Patient currently walks with a cane outside of the home. She states she did fall last week when crouching down under a desk and lost her balance. She does state she feels like she is off balance frequently. Patient states she has trouble sleeping because she cannot lay in 1 position for very long without having pain. Patient states she will sometimes get pressure in her right hip and it feels like it is swollen. Patient states she did physical therapy a few years back which was ineffective and almost made things worse.   Patient denies any bowel/bladder incontinence or saddle anesthesia. Patient currently moved back here from [place] within the last few months and she has yet to get established with a family doctor. She currently lives at home with her 3 kids ranging from ages 14-13. She is currently working online to get her masters degree. 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 She currently has a ventriculoperitoneal shunt, but it is shut off. She also has a history of Chiari malformation and decompression. She was previously seeing a specialist in [facility] but has not been there since 2019. Today, 01/31/2023, patient presents for planned follow up on chronic pain. She underwent the right SI joint RFA on 01/18/2023. She has noticed significant improvement in her right SI joint pain. She has noticed improvement with walking. She states the pain in her low back has returned and she has been having some swelling of her right low back. She states the swelling is not new to her. She had a long car ride to [place] a few weeks ago and noticed increased swelling, the swelling has gone down but has not resolved completely. She has been using heat and taking Ibuprofen which seems to help. She does not use ice due to a bad flare with ice in the past. She denies any new leg weakness, saddle anesthesia, or bowel/bladder incontinence. History of Interventions:   Surgery: No previous lumbar surgeries  Injections:   B/L SI joint injection (05/03/2022) - relief in SI pain (increased midline low back pain)  LESI L4-5 (06/28/2022, 11/29/2022) - effective  B/L SI joint injection (08/17/2022) - effective x 3 months  Right SI RFA (01/18/2023) - effective    Current Treatment Medications:   Ibuprofen 800mg OTC- relatively ineffective   Tylenol - ineffecitve     Historical Treatment Medications:   Flexeril - ineffective  Baclofen - ineffective   Gabapentin - helps a little.  100mg TID   Lyrica - s/e  Cymbalta - fibromyalgia, withdrawal was terrible  Amitriptyline - depression - bipolar - not recently. Zanaflex - ineffective   Norco  Mobic - upset stomach, ineffective    Imaging:  CT Lumbar (01/20/2022)  Narrative   PROCEDURE: CT LUMBAR SPINE W CONTRAST       CLINICAL INFORMATION: DDD (degenerative disc disease), lumbosacral, Bilateral stenosis of lateral recess of lumbar spine, Lumbar radiculopathy. COMPARISON: Intra-articular administration of contrast performed on the same day. Plain radiographs dated 21 March 2010. Kiran Almonte TECHNIQUE: 3 mm axial images were obtained of the lumbar spine following intrathecal administration of 18 mL of Omnipaque 180. Sagittal and coronal reconstructions were obtained. Angled images were reconstructed through the L3-4, L4-5 and L5-S1 disc    levels. All CT scans at this facility use dose modulation, iterative reconstruction, and/or weight-based dosing when appropriate to reduce radiation dose to as low as reasonably achievable. FINDINGS:               The lumbar vertebral bodies are normally aligned. There are no compression fractures. No pars defects are noted. There appears be an intraspinal catheter entering the spine at L4-5       There are no gross abnormalities within the spinal canal.       On the axial images, at T11-T12, T12-L1 and L1-L2, there is no significant disc herniation, canal or foraminal stenosis. At L2-3, there is a 2.6 mm disc protrusion. This results in mild-to-moderate canal and bilateral foraminal stenosis. At L3-4, there is no disc herniation, canal or foraminal stenosis. At L4-5, there is a 4 mm disc protrusion and facet hypertrophy this results in moderate severe canal and bilateral foraminal stenosis. At L5-S1, there is a 1.8 mm disc protrusion and facet hypertrophy. This results in mild canal and moderate bilateral foraminal stenosis.        There is mild degenerative change involving the sacroiliac joints bilaterally       There is a 16 mm sclerotic lesion. In the left iliac crest.       There is a possible intraperitoneal catheter present. Impression       1. Degenerative changes most marked at L4-5, at which level there is moderate to severe canal and bilateral foraminal stenosis. There appears to be an intraspinal catheter entering the spine at L4-5.   2. There is mild canal and moderate bilateral foraminal stenosis at L5-S1. .   3. There is mild-to-moderate canal and bilateral foraminal stenosis at L2-3.   4. There is degenerative change involving the sacroiliac joints bilaterally. 5. There is a 16 mm sclerotic lesion involving the left iliac crest.   6. There is a possible intraperitoneal catheter present. .                   **This report has been created using voice recognition software. It may contain minor errors which are inherent in voice recognition technology. **       Final report electronically signed by DR Kobe Bourgeois on 1/20/2022 11:55 AM         Past Medical History:   Diagnosis Date    ADHD (attention deficit hyperactivity disorder)     Bipolar disorder ([facility] [address]     Chiari malformation     Depression     Fibromyalgia     Pseudotumor cerebri        Past Surgical History:   Procedure Laterality Date    BACK INJECTION Bilateral 5/3/2022    bilateral sacroiliac joint injection.  performed by Anabel Felder DO at [facility] Bilateral 8/17/2022    bilateral sacroiliac joint injection performed by Anabel Felder DO at [address] [place] [address]  2007    OTHER SURGICAL HISTORY  2007    lumbar shunt/ shunt    OTHER SURGICAL HISTORY      chiari decompression    PAIN MANAGEMENT PROCEDURE N/A 6/28/2022    lumbar epidural steroid injection Lumbar 4-5 performed by Anabel Felder DO at [facility] N/A 11/29/2022    lumbar epidural steroid injection Lumbar 4-5 performed by Anabel Felder DO at [address] [place] [address] PROCEDURE Right 1/18/2023    sacroiliac joint radiofrequency ablation,  RIGHT side first performed by Johnathan Galvan DO at [facility]         Family History   Problem Relation Age of Onset    Hypertension Mother     Alzheimer's Disease Father     Stroke Father     Heart Disease Father        Medications & Allergies:   Current Outpatient Medications   Medication Instructions    amphetamine-dextroamphetamine (ADDERALL XR) 15 MG extended release capsule 15 mg, Oral, EVERY MORNING, (Take every morning for concentration)    cloNIDine (CATAPRES) 0.1 mg, Oral, 2 TIMES DAILY, (Take 1 tablet in the morning and 1 tablet at bedtime for anxiety)    hydrOXYzine pamoate (VISTARIL) 100 mg, Oral, 2 TIMES DAILY PRN, And/or sleep    ibuprofen (ADVIL;MOTRIN) 800 mg, Oral, EVERY 8 HOURS PRN    lamoTRIgine (LAMICTAL) 200 mg, Oral, DAILY    methocarbamol (ROBAXIN) 500 mg, Oral, NIGHTLY PRN       No Known Allergies    Review of Systems:   Constitutional: negative for weight changes or fevers  Genitourinary: negative for bowel/bladder incontinence   Musculoskeletal: positive for low back pain, right leg pain  Neurological: negative for any leg weakness or numbness/tingling  Behavioral/Psych: negative for anxiety/depression   All other systems reviewed and are negative    Objective: There were no vitals filed for this visit. Constitutional: Pleasant, no acute distress   Head: Normocephalic, atraumatic   Eyes: Conjunctivae normal   Neck: Supple, symmetrical   Lungs: Normal respiratory effort, non-labored breathing   Cardiovascular: Limbs warm and well perfused   Abdomen: Non-protruded   Musculoskeletal: Muscle bulk symmetric, no atrophy, no gross deformities   · Lower Extremities: ROM WNL. · Thorax: No paraspinal tenderness bilaterally. No scoliosis or kyphosis. · Lumbar Spine: ROM WNL. Lumbar paraspinals mildly tender to palpation bilaterally, right great than left.  SLR positive bilaterally. ROSSY positive bilaterally (improved). GAENSLEN positive bilaterally (improved). Positive SI joint distraction bilaterally (improved). Positive facet loading bilaterally. Bilateral SI joints tender to palpation (improved). Bilateral greater trochanters non-tender to palpation. Neurological: Cranial nerves II-XII grossly intact. · Gait - Antalgic gait. Ambulates with assistive device (cane). · Motor: 3/5 muscle strength in bilateral hip flexion, knee flexion, knee extension, ankle dorsiflexion, and ankle plantar flexion (pain limited)  · Sensory: LT sensation intact in lower limbs, tingling through bilateral legs   · Reflexes: 2+ symmetrical in bilateral achilles, 2+ bilateral patellar, negative ankle clonus, downgoing babinski   Skin: No rashes or lesions present   Psychological: Cooperative, no exaggerated pain behaviors     Assessment:    Diagnosis Orders   1. Radiculopathy, lumbosacral region  CHG FLUOR NEEDLE/CATH SPINE/PARASPINAL DX/THER ADDON    TN NJX DX/THER SBST INTRLMNR LMBR/SAC W/IMG GDN      2. Sacroiliac joint dysfunction of right side        3. Lumbar spondylosis        4. Facet arthropathy        5. Neuropathic pain        6. Chronic pain syndrome              Amadeo Garcia is a 45 y. o.female presenting to the pain clinic for evaluation of low back and right leg pain. Patient's history and physical consistent with bilateral sacroiliac joint dysfunction as well as lumbar radiculopathy. Responded significantly to right SI RFA two weeks ago and continues to notice slow improvement. She has responded well to lumbar epidruals in the past. I have set her up for a lumbar epidural steroid injection at L4-5. We will plan to move forward with a left SI RFA in the future. I have sent a prescription for ibuprofen 800 mg 2-3 times daily PRN. I also started her on robaxin 500 mg nightly. Plan:    The following treatment recommendations and plan were discussed in detail with Leanne DAWN [place]. Imaging:   I have reviewed patients imaging of lumbar CT and results were discussed with patient today. Analgesics:   Patient is taking Acetaminophen. Patient informed that the maximum amount of acetaminophen taken on a regular basis should only be 4000 mg per day. Adjuvants:   PROCEDURES ONLY    Interventions: In presence of lumbosacral radiating pain, the option of lumbar epidural steroid injection approach at L4-5 was chosen. The risks and benefits were discussed in detail with the patient. Patient wants to proceed with the injection. Anticoagulation/NPO Recommendations:   Patient does need to hold Ibuprofen x 2 days prior to the procedure. Patient does need to be NPO x8 hours prior to the procedure. We will start an IV for the procedure  Patient will need a     Multidisciplinary Pain Management:   In the presence of complex, chronic, and multi-factorial pain, the importance of a multidisciplinary approach to pain management in the patients management regimen was emphasized and discussed in great detail. PHYSICAL THERAPY: Patient is advised to see a physical therapist for gentle stretching exercises and conditioning exercises for management of pain.    PSYCHOLOGY: Patient is advised to see a clinical pain psychologist, for the psychosocial aspect of pain care through coping skills, relaxation strategies, cognitive group therapy etc.     Referrals:  None    Prescriptions Written This Visit:   Robaxin 500 mg (#30, 0 refills)    Follow-up: JUVENTINO, in office 4 weeks after    [facility], DO

## 2023-02-14 ENCOUNTER — APPOINTMENT (OUTPATIENT)
Dept: GENERAL RADIOLOGY | Age: 38
End: 2023-02-14
Attending: ANESTHESIOLOGY
Payer: MEDICARE

## 2023-02-14 ENCOUNTER — HOSPITAL ENCOUNTER (OUTPATIENT)
Age: 38
Setting detail: OUTPATIENT SURGERY
Discharge: HOME | End: 2023-02-14
Attending: ANESTHESIOLOGY | Admitting: ANESTHESIOLOGY
Payer: MEDICARE

## 2023-02-14 VITALS
HEIGHT: 64 IN | DIASTOLIC BLOOD PRESSURE: 55 MMHG | HEART RATE: 79 BPM | RESPIRATION RATE: 16 BRPM | SYSTOLIC BLOOD PRESSURE: 120 MMHG | BODY MASS INDEX: 40.8 KG/M2 | OXYGEN SATURATION: 98 % | WEIGHT: 239 LBS | TEMPERATURE: 95.9 F

## 2023-02-14 PROCEDURE — 3209999900 FLUORO FOR SURGICAL PROCEDURES

## 2023-02-14 PROCEDURE — 2709999900 HC NON-CHARGEABLE SUPPLY: Performed by: ANESTHESIOLOGY

## 2023-02-14 PROCEDURE — 6360000004 HC RX CONTRAST MEDICATION: Performed by: ANESTHESIOLOGY

## 2023-02-14 PROCEDURE — 99152 MOD SED SAME PHYS/QHP 5/>YRS: CPT | Performed by: ANESTHESIOLOGY

## 2023-02-14 PROCEDURE — 6360000002 HC RX W HCPCS: Performed by: ANESTHESIOLOGY

## 2023-02-14 PROCEDURE — 2500000003 HC RX 250 WO HCPCS: Performed by: ANESTHESIOLOGY

## 2023-02-14 PROCEDURE — 3600000054 HC PAIN LEVEL 3 BASE: Performed by: ANESTHESIOLOGY

## 2023-02-14 PROCEDURE — 7100000011 HC PHASE II RECOVERY - ADDTL 15 MIN: Performed by: ANESTHESIOLOGY

## 2023-02-14 PROCEDURE — 3600000055 HC PAIN LEVEL 3 ADDL 15 MIN: Performed by: ANESTHESIOLOGY

## 2023-02-14 PROCEDURE — 7100000010 HC PHASE II RECOVERY - FIRST 15 MIN: Performed by: ANESTHESIOLOGY

## 2023-02-14 RX ORDER — LIDOCAINE HYDROCHLORIDE 10 MG/ML
INJECTION, SOLUTION EPIDURAL; INFILTRATION; INTRACAUDAL; PERINEURAL PRN
Status: DISCONTINUED | OUTPATIENT
Start: 2023-02-14 | End: 2023-02-14 | Stop reason: ALTCHOICE

## 2023-02-14 RX ORDER — FENTANYL CITRATE 50 UG/ML
INJECTION, SOLUTION INTRAMUSCULAR; INTRAVENOUS PRN
Status: DISCONTINUED | OUTPATIENT
Start: 2023-02-14 | End: 2023-02-14 | Stop reason: ALTCHOICE

## 2023-02-14 RX ORDER — DEXAMETHASONE SODIUM PHOSPHATE 4 MG/ML
INJECTION, SOLUTION INTRA-ARTICULAR; INTRALESIONAL; INTRAMUSCULAR; INTRAVENOUS; SOFT TISSUE PRN
Status: DISCONTINUED | OUTPATIENT
Start: 2023-02-14 | End: 2023-02-14 | Stop reason: ALTCHOICE

## 2023-02-14 RX ORDER — BACTERIOSTATIC SODIUM CHLORIDE 0.9 %
VIAL (ML) INJECTION PRN
Status: DISCONTINUED | OUTPATIENT
Start: 2023-02-14 | End: 2023-02-14 | Stop reason: ALTCHOICE

## 2023-02-14 RX ORDER — MIDAZOLAM HYDROCHLORIDE 1 MG/ML
INJECTION INTRAMUSCULAR; INTRAVENOUS PRN
Status: DISCONTINUED | OUTPATIENT
Start: 2023-02-14 | End: 2023-02-14 | Stop reason: ALTCHOICE

## 2023-02-14 ASSESSMENT — PAIN - FUNCTIONAL ASSESSMENT: PAIN_FUNCTIONAL_ASSESSMENT: 0-10

## 2023-02-14 NOTE — PROCEDURES
Pre-operative Diagnosis: Radicular leg pain     Post-operative Diagnosis: Radicular leg pain     Procedure: Lumbar epidural steroid injection    Procedure Description:  After having obtained a signed informed consent, the patient was taken to the fluoroscopy suite and placed in the prone position. The patient's back was prepped with chloraprep and draped in a sterile fashion. A total of 1.5 cc of 1 % lidocaine was used to anesthetize the skin and underlying tissues. Under fluoroscopic guidance, a single 20G Tuohy needle was advanced using midline approach at the L4/L5 interspace until gaining the epidural space using the loss of resistance to saline syringe technique. There were no paresthesias, heme, or CSF aspiration. A total of 0.25 cc of Omnipaque 300 were injected having had adequate dye spread within the epidural space. Needle placement and contrast spread was confirmed using the AP and contralateral views. 10 mg of Dexamethasone with 1 cc of saline solution were injected in the epidural space. The needle was flushed and removed without any complication. The patient tolerated the procedure well and was transported to the recovery room. The patient was observed for 15 minutes to then discharged in an ambulatory fashion.     Procedural Complications: None  Estimated Blood Loss: 0 mL      IV sedation was used during the procedure:  - Moderate intravenous conscious sedation was supervised by Dr. Tammy Hoover  - The patient was independently monitored by a Registered Nurse assigned to the procedure room  - Monitoring included automated blood pressure, continuous EKG, and continuous pulse oximetry  - The detailed conscious record is permanently stored in the [place] [id number]  - The following is the conscious sedation record:  Start Time: 12:56  End Time : 13:11  Duration: 15 minutes   Medications Administered: 2 mg Versed, 50 mcg Fentanyl       Marlon Mccarty DO  Interventional Pain Management/PM&R [facility]

## 2023-02-14 NOTE — POST SEDATION
[facility]  Sedation/Analgesia Post Sedation Record    Pt Name: Leanne Diaz  MRN: [id number]  YOB: 1985  Procedure Performed By: Marlon Mccarty DO  Primary Care Physician: No primary care provider on file.    POST-PROCEDURE    Physicians/Assistants: Marlon Mccarty DO  Procedure Performed: See Procedure Note   Sedation/Anesthesia: Versed and Fentanyl (See procedure note for amount and duration)  Estimated Blood Loss:     0  ml  Specimens Removed: None        Complications: None           Marlon Mccarty DO  Electronically signed 2/14/2023 at 1:44 PM

## 2023-02-14 NOTE — PRE SEDATION
[facility]  Pre-Sedation/Analgesia History & Physical    Pt Name: Leanne Diaz  MRN: [id number]  YOB: 1985  Provider Performing Procedure: Marlon Mccarty DO   Primary Care Physician: No primary care provider on file.      MEDICAL HISTORY       has a past medical history of ADHD (attention deficit hyperactivity disorder), Bipolar disorder (HCC), Chiari malformation, Depression, Fibromyalgia, and Pseudotumor cerebri.  SURGICAL HISTORY   has a past surgical history that includes Cholecystectomy (01/01/2007); other surgical history (01/01/2007); other surgical history; Ventriculoperitoneal shunt; Back Injection (Bilateral, 05/03/2022); Pain management procedure (N/A, 06/28/2022); Back Injection (Bilateral, 08/17/2022); Pain management procedure (N/A, 11/29/2022); Pain management procedure (Right, 01/18/2023); and Tubal ligation (Bilateral, 2014).    ALLERGIES   Allergies as of 01/31/2023    (No Known Allergies)       MEDICATIONS   Prior to Admission medications    Medication Sig Start Date End Date Taking? Authorizing Provider   ibuprofen (ADVIL;MOTRIN) 800 MG tablet Take 1 tablet by mouth every 8 hours as needed for Pain 1/31/23 3/2/23  LIBAN Rajan CNP   methocarbamol (ROBAXIN) 500 MG tablet Take 1 tablet by mouth nightly as needed (pain) 1/31/23 3/2/23  LIBAN Rjaan CNP   amphetamine-dextroamphetamine (ADDERALL XR) 15 MG extended release capsule Take 1 capsule by mouth every morning for 30 days. (Take every morning for concentration) Max Daily Amount: 15 mg 1/25/23 2/24/23  Kade Jolly DO   hydrOXYzine pamoate (VISTARIL) 100 MG capsule Take 1 capsule by mouth 2 times daily as needed for Anxiety And/or sleep 1/10/23   Kade Jolly DO   lamoTRIgine (LAMICTAL) 200 MG tablet Take 1 tablet by mouth daily 12/6/22   Kade Jolly DO   cloNIDine (CATAPRES) 0.1 MG tablet Take 1 tablet by mouth 2 times daily (Take 1 tablet in the morning and 1 tablet at bedtime  for anxiety) 11/2/22   Eulalia Pate DO     PHYSICAL:   Vitals:    02/14/23 1309   BP: 109/66   Pulse: 76   Resp: 17   Temp:    SpO2: 99%     PLANNED PROCEDURE   See procedure note  SEDATION  Planned agent: Versed and Fentanyl  ASA Classification: 1  Class 1: A normal healthy patient  Class 2: Pt with mild to moderate systemic disease  Class 3: Severe systemic disease or disturbance  Class 4: Severe systemic disorders that are already life threatening. Class 5: Moribund pt with little chances of survival, for more than 24 hours. Mallampati I Airway Classification: 1    1. Pre-procedure diagnostic studies complete and results available. 2. Previous sedation/anesthesia experiences assessed. 3. The patient is an appropriate candidate to undergo the planned procedure sedation and anesthesia. (Refer to nursing sedation/analgesia documentation record)  4. Formulation and discussion of sedation/procedure plan, risks, and expectations with patient and/or responsible adult completed. 5. Patient examined immediately prior to the procedure.  (Refer to nursing sedation/analgesia documentation record)    Johnathan Galvan DO  Electronically signed 2/14/2023 at 1:44 PM

## 2023-02-14 NOTE — PROGRESS NOTES
1313 To recovery via cart. Spont resp. VSS. Report received from surgical rn. Snack and drink given. Call bell in reach. 1325 IV discontinued.  Up to dress self    1345 Discharge to home in stable ambulatory condition with partner

## 2023-02-22 DIAGNOSIS — R41.840 CONCENTRATION DEFICIT: ICD-10-CM

## 2023-02-22 RX ORDER — DEXTROAMPHETAMINE SACCHARATE, AMPHETAMINE ASPARTATE MONOHYDRATE, DEXTROAMPHETAMINE SULFATE AND AMPHETAMINE SULFATE 3.75; 3.75; 3.75; 3.75 MG/1; MG/1; MG/1; MG/1
15 CAPSULE, EXTENDED RELEASE ORAL EVERY MORNING
Qty: 30 CAPSULE | Refills: 0 | Status: CANCELLED | OUTPATIENT
Start: 2023-02-22 | End: 2023-03-24

## 2023-02-23 RX ORDER — DEXTROAMPHETAMINE SACCHARATE, AMPHETAMINE ASPARTATE, DEXTROAMPHETAMINE SULFATE AND AMPHETAMINE SULFATE 3.75; 3.75; 3.75; 3.75 MG/1; MG/1; MG/1; MG/1
15 TABLET ORAL EVERY MORNING
Qty: 30 TABLET | Refills: 0 | Status: SHIPPED | OUTPATIENT
Start: 2023-02-23 | End: 2023-03-27 | Stop reason: DRUGHIGH

## 2023-03-14 ENCOUNTER — OFFICE VISIT (OUTPATIENT)
Dept: PHYSICAL MEDICINE AND REHAB | Age: 38
End: 2023-03-14

## 2023-03-14 VITALS
SYSTOLIC BLOOD PRESSURE: 118 MMHG | DIASTOLIC BLOOD PRESSURE: 72 MMHG | HEIGHT: 64 IN | WEIGHT: 239 LBS | BODY MASS INDEX: 40.8 KG/M2

## 2023-03-14 DIAGNOSIS — M47.819 FACET ARTHROPATHY: ICD-10-CM

## 2023-03-14 DIAGNOSIS — G89.4 CHRONIC PAIN SYNDROME: ICD-10-CM

## 2023-03-14 DIAGNOSIS — M79.2 NEUROPATHIC PAIN: ICD-10-CM

## 2023-03-14 DIAGNOSIS — M53.3 SACROILIAC JOINT DYSFUNCTION OF RIGHT SIDE: ICD-10-CM

## 2023-03-14 DIAGNOSIS — M47.816 LUMBAR SPONDYLOSIS: Primary | ICD-10-CM

## 2023-03-14 DIAGNOSIS — M54.17 RADICULOPATHY, LUMBOSACRAL REGION: ICD-10-CM

## 2023-03-14 RX ORDER — METHOCARBAMOL 500 MG/1
500 TABLET, FILM COATED ORAL 2 TIMES DAILY PRN
Qty: 60 TABLET | Refills: 0 | Status: SHIPPED | OUTPATIENT
Start: 2023-03-14 | End: 2023-04-13

## 2023-03-14 NOTE — PROGRESS NOTES
Chronic Pain/PM&R Clinic Note     Encounter Date: 3/14/23    Subjective:   Chief Complaint:   Chief Complaint   Patient presents with    Follow-up     F/u procedure       History of Present Illness:   Julee Lima is a 45 y.o. female seen in the clinic initially on 04/11/2022 upon request from Magdalene Primrose, [place]  for her history of low back pain. States she has had low back pain for several years but it has been getting significantly worse in the last year, especially since October 2021. Patient denies any prior events that may have caused aggravation of her pain. Patient states she tries to stay active but has been very difficult due to her pain level. She states she hears grinding in her right low back. She states pain is mainly located right low back radiating down her right leg to her right knee. She will occasionally get pain that shoots down to her right foot, this is dependent on how active she is. She has recently noticed some pain in her left low back radiating into her left leg. Patient states her pain is worse with increased activity and better at rest.  Patient states she uses a cane to help take the pressure off her back. When she is out shopping she has to use a cart to help take the pressure off her back. Patient currently walks with a cane outside of the home. She states she did fall last week when crouching down under a desk and lost her balance. She does state she feels like she is off balance frequently. Patient states she has trouble sleeping because she cannot lay in 1 position for very long without having pain. Patient states she will sometimes get pressure in her right hip and it feels like it is swollen. Patient states she did physical therapy a few years back which was ineffective and almost made things worse. Patient denies any bowel/bladder incontinence or saddle anesthesia.   Patient currently moved back here from [place] within the last few months and she has yet to get established with a family doctor. She currently lives at home with her 3 kids ranging from ages 14-13. She is currently working online to get her masters degree. 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 She currently has a ventriculoperitoneal shunt, but it is shut off. She also has a history of Chiari malformation and decompression. She was previously seeing a specialist in [place] but has not been there since 2019. Today, 01/31/2023, patient presents for planned follow up on chronic pain. She underwent the right SI joint RFA on 01/18/2023. She has noticed significant improvement in her right SI joint pain. She has noticed improvement with walking. She states the pain in her low back has returned and she has been having some swelling of her right low back. She states the swelling is not new to her. She had a long car ride to [place] a few weeks ago and noticed increased swelling, the swelling has gone down but has not resolved completely. She has been using heat and taking Ibuprofen which seems to help. She does not use ice due to a bad flare with ice in the past. She denies any new leg weakness, saddle anesthesia, or bowel/bladder incontinence. Today, 3/14/2023, patient presents for planned follow-up on chronic pain. She underwent a lumbar epidural steroid injection as she has had in the past and feels it was only effective for about one day. She is wondering if there is anything else that could potentially help with her low back pain. She states she is having trouble standing up straight and with walking any amount of distance. In regards to the sacroiliac joint pain, she has been doing very well overall since her radiofrequency ablation. She states she has been having some increasing numbness in her groin and right leg. She states last month she developed vaginal sores and believes her boyfriend may have given her herpes.   She is wondering if there is any connection with this to her other neuropathic pain/symptoms. She has a follow-up next month with a physician to examine and treat the issues she is having vaginally. She denies any new focal leg weakness or bowel/bladder incontinence. History of Interventions:   Surgery: No previous lumbar surgeries. LP shunt   Injections:   B/L SI joint injection (05/03/2022) - relief in SI pain (increased midline low back pain)  LESI L4-5 (06/28/2022, 11/29/2022, 02/14/2023) - effective initially  B/L SI joint injection (08/17/2022) - effective x 3 months  Right SI RFA (01/18/2023) - effective    Current Treatment Medications:   Ibuprofen 800mg OTC- relatively ineffective   Tylenol - ineffecitve   Robaxin - effective    Historical Treatment Medications:   Flexeril - ineffective  Baclofen - ineffective   Gabapentin - helps a little. 100mg TID   Lyrica - s/e  Cymbalta - fibromyalgia, withdrawal was terrible  Amitriptyline - depression - bipolar - not recently. Zanaflex - ineffective   Norco  Mobic - upset stomach, ineffective    Imaging:  CT Lumbar (01/20/2022)  Narrative   PROCEDURE: CT LUMBAR SPINE W CONTRAST       CLINICAL INFORMATION: DDD (degenerative disc disease), lumbosacral, Bilateral stenosis of lateral recess of lumbar spine, Lumbar radiculopathy. COMPARISON: Intra-articular administration of contrast performed on the same day. Plain radiographs dated 21 March 2010. Gini Leaf TECHNIQUE: 3 mm axial images were obtained of the lumbar spine following intrathecal administration of 18 mL of Omnipaque 180. Sagittal and coronal reconstructions were obtained. Angled images were reconstructed through the L3-4, L4-5 and L5-S1 disc    levels. All CT scans at this facility use dose modulation, iterative reconstruction, and/or weight-based dosing when appropriate to reduce radiation dose to as low as reasonably achievable.        FINDINGS:               The lumbar vertebral bodies are normally aligned. There are no compression fractures. No pars defects are noted. There appears be an intraspinal catheter entering the spine at L4-5       There are no gross abnormalities within the spinal canal.       On the axial images, at T11-T12, T12-L1 and L1-L2, there is no significant disc herniation, canal or foraminal stenosis. At L2-3, there is a 2.6 mm disc protrusion. This results in mild-to-moderate canal and bilateral foraminal stenosis. At L3-4, there is no disc herniation, canal or foraminal stenosis. At L4-5, there is a 4 mm disc protrusion and facet hypertrophy this results in moderate severe canal and bilateral foraminal stenosis. At L5-S1, there is a 1.8 mm disc protrusion and facet hypertrophy. This results in mild canal and moderate bilateral foraminal stenosis. There is mild degenerative change involving the sacroiliac joints bilaterally       There is a 16 mm sclerotic lesion. In the left iliac crest.       There is a possible intraperitoneal catheter present. Impression       1. Degenerative changes most marked at L4-5, at which level there is moderate to severe canal and bilateral foraminal stenosis. There appears to be an intraspinal catheter entering the spine at L4-5.   2. There is mild canal and moderate bilateral foraminal stenosis at L5-S1. .   3. There is mild-to-moderate canal and bilateral foraminal stenosis at L2-3.   4. There is degenerative change involving the sacroiliac joints bilaterally. 5. There is a 16 mm sclerotic lesion involving the left iliac crest.   6. There is a possible intraperitoneal catheter present. .                   **This report has been created using voice recognition software. It may contain minor errors which are inherent in voice recognition technology. **       Final report electronically signed by DR Gio Lee on 1/20/2022 11:55 AM         Past Medical History:   Diagnosis Date    ADHD (attention deficit hyperactivity disorder)     Bipolar disorder ([facility] [address]     Chiari malformation     Depression     Fibromyalgia     Pseudotumor cerebri        Past Surgical History:   Procedure Laterality Date    BACK INJECTION Bilateral 05/03/2022    bilateral sacroiliac joint injection.  performed by Fidencio Field DO at [facility] Bilateral 08/17/2022    bilateral sacroiliac joint injection performed by Fidencio Field DO at [facility]  01/01/2007    OTHER SURGICAL HISTORY  01/01/2007    lumbar shunt/ shunt    OTHER SURGICAL HISTORY      chiari decompression    PAIN MANAGEMENT PROCEDURE N/A 06/28/2022    lumbar epidural steroid injection Lumbar 4-5 performed by Fidencio Field DO at [facility] N/A 11/29/2022    lumbar epidural steroid injection Lumbar 4-5 performed by Fidencio Field DO at [facility] Right 01/18/2023    sacroiliac joint radiofrequency ablation,  RIGHT side first performed by Fidencio Field DO at [facility] N/A 2/14/2023    lumbar 4-5 epidural steroid injection performed by Fidencio Field DO at [address] Bilateral 2014    VENTRICULOPERITONEAL SHUNT         Family History   Problem Relation Age of Onset    Hypertension Mother     Alzheimer's Disease Father     Stroke Father     Heart Disease Father        Medications & Allergies:   Current Outpatient Medications   Medication Instructions    amphetamine-dextroamphetamine (ADDERALL, 15MG,) 15 MG tablet 15 mg, Oral, EVERY MORNING    cloNIDine (CATAPRES) 0.1 mg, Oral, 2 TIMES DAILY, (Take 1 tablet in the morning and 1 tablet at bedtime for anxiety)    hydrOXYzine pamoate (VISTARIL) 100 mg, Oral, 2 TIMES DAILY PRN, And/or sleep    ibuprofen (ADVIL;MOTRIN) 800 mg, Oral, EVERY 8 HOURS PRN    lamoTRIgine (LAMICTAL) 200 mg, Oral, DAILY methocarbamol (ROBAXIN) 500 mg, Oral, 2 TIMES DAILY PRN       No Known Allergies    Review of Systems:   Constitutional: negative for weight changes or fevers  Genitourinary: negative for bowel/bladder incontinence   Musculoskeletal: positive for low back pain, right leg pain  Neurological: negative for any leg weakness or numbness/tingling  Behavioral/Psych: negative for anxiety/depression   All other systems reviewed and are negative    Objective:     Vitals:    03/14/23 0926   BP: 118/72     Constitutional: Pleasant, no acute distress   Head: Normocephalic, atraumatic   Eyes: Conjunctivae normal   Neck: Supple, symmetrical   Lungs: Normal respiratory effort, non-labored breathing   Cardiovascular: Limbs warm and well perfused   Abdomen: Non-protruded   Musculoskeletal: Muscle bulk symmetric, no atrophy, no gross deformities   · Lower Extremities: ROM WNL. · Thorax: No paraspinal tenderness bilaterally. No scoliosis or kyphosis. · Lumbar Spine: ROM WNL. Lumbar paraspinals mildly tender to palpation bilaterally, right great than left. SLR positive bilaterally. ROSSY positive bilaterally (improved). GAENSLEN positive bilaterally (improved). Positive SI joint distraction bilaterally (improved). Positive facet loading bilaterally. Bilateral SI joints tender to palpation (improved). Bilateral greater trochanters non-tender to palpation. Neurological: Cranial nerves II-XII grossly intact. · Gait - Antalgic gait. Ambulates with assistive device (cane).    · Motor: 3/5 muscle strength in bilateral hip flexion, knee flexion, knee extension, ankle dorsiflexion, and ankle plantar flexion (pain limited)  · Sensory: LT sensation intact in lower limbs, tingling through bilateral legs   · Reflexes: 2+ symmetrical in bilateral achilles, 2+ bilateral patellar, negative ankle clonus, downgoing babinski   Skin: No rashes or lesions present   Psychological: Cooperative, no exaggerated pain behaviors     Assessment: Diagnosis Orders   1. Lumbar spondylosis  CHG FLUOR NEEDLE/CATH SPINE/PARASPINAL DX/THER ADDON    WA NJX DX/THER AGT PVRT FACET JT LMBR/SAC 1 LEVEL    WA NJX DX/THER AGT PVRT FACET JT LMBR/SAC 2ND LEVEL      2. Radiculopathy, lumbosacral region        3. Sacroiliac joint dysfunction of right side        4. Facet arthropathy        5. Neuropathic pain        6. Chronic pain syndrome            Mer Foster is a 45 y. o.female presenting to the pain clinic for evaluation of low back and right leg pain. Patient's history and physical consistent with bilateral sacroiliac joint dysfunction as well as lumbar radiculopathy. She responded significantly to right SI RFA. She has responded well to lumbar epidruals in the past but now has not noticed as much relief. It appears that she has more lumbar facet mediated pain. I have set her up for a lumbar facet medial branch block at bilateral L4-5 and L5-S1 with Dr. Wendy Perkins. We discussed the potential of doing a lumbar radiofrequency ablation for more long-term relief. I have sent a prescription for ibuprofen 800 mg 2-3 times daily PRN and increased her robaxin to 500 mg up to twice daily as needed. I recommend she go to the urgent care in regards to her vaginal concerns. If it is determined that she has herpes, she will likely get an antiviral.  We will continue to monitor the numbness that she is having in her leg. Plan: The following treatment recommendations and plan were discussed in detail with Mer Foster. Imaging:   I have reviewed patients imaging of lumbar CT and results were discussed with patient today. Analgesics:   Patient is taking Acetaminophen. Patient informed that the maximum amount of acetaminophen taken on a regular basis should only be 4000 mg per day. Adjuvants:   PROCEDURES ONLY    Interventions:    In presence of lumbar axial back pain and with physical exam consistent for facetal pain, the option of medial branch blocks at bilateral L4/5 and L5/S1 was chosen. The risks and benefits were discussed in detail with the patient. Patient wants to proceed with the injection. Anticoagulation/NPO Recommendations:   Patient does need to hold Ibuprofen x 2 days prior to the procedure. Patient does need to be NPO x8 hours prior to the procedure. We will start an IV for the procedure  Patient will need a     Multidisciplinary Pain Management:   In the presence of complex, chronic, and multi-factorial pain, the importance of a multidisciplinary approach to pain management in the patients management regimen was emphasized and discussed in great detail. PHYSICAL THERAPY: Patient is advised to see a physical therapist for gentle stretching exercises and conditioning exercises for management of pain.    PSYCHOLOGY: Patient is advised to see a clinical pain psychologist, for the psychosocial aspect of pain care through coping skills, relaxation strategies, cognitive group therapy etc.     Referrals:  None    Prescriptions Written This Visit:   Robaxin 500 mg (#60, 0 refills)    Follow-up: Lumbar MBB, in office one week after    LIBAN Bernal - CNP

## 2023-03-23 NOTE — DISCHARGE INSTRUCTIONS
rub.  Family and friends who visit you should not touch the surgical wound or dressings. Family and friends should clean their hands with soap and water or an alcohol-based hand rub before and after visiting you. If you do not see them clean their hands, ask them to clean their hands. What do I need to do when I go home from the hospital?  Before you go home, your doctor or nurse should explain everything you need to know about taking care of your wound. Make sure you understand how to care for your wound before you leave the hospital.  Always clean your hands before and after caring for your wound. Before you go home, make sure you know who to contact if you have questions or problems after you get home. If you have any symptoms of an infection, such as redness and pain at the surgery site, drainage, or fever, call your doctor immediately. If you have additional questions, please ask your doctor or nurse.

## 2023-03-27 ENCOUNTER — PREP FOR PROCEDURE (OUTPATIENT)
Dept: PHYSICAL MEDICINE AND REHAB | Age: 38
End: 2023-03-27

## 2023-03-27 ENCOUNTER — TELEMEDICINE (OUTPATIENT)
Dept: PSYCHIATRY | Age: 38
End: 2023-03-27
Payer: MEDICARE

## 2023-03-27 DIAGNOSIS — F41.0 PANIC DISORDER: ICD-10-CM

## 2023-03-27 DIAGNOSIS — R41.840 CONCENTRATION DEFICIT: Primary | ICD-10-CM

## 2023-03-27 DIAGNOSIS — F31.9 BIPOLAR I DISORDER (HCC): ICD-10-CM

## 2023-03-27 DIAGNOSIS — F41.1 GENERALIZED ANXIETY DISORDER: ICD-10-CM

## 2023-03-27 PROCEDURE — G8428 CUR MEDS NOT DOCUMENT: HCPCS | Performed by: STUDENT IN AN ORGANIZED HEALTH CARE EDUCATION/TRAINING PROGRAM

## 2023-03-27 PROCEDURE — G8484 FLU IMMUNIZE NO ADMIN: HCPCS | Performed by: STUDENT IN AN ORGANIZED HEALTH CARE EDUCATION/TRAINING PROGRAM

## 2023-03-27 PROCEDURE — 99214 OFFICE O/P EST MOD 30 MIN: CPT | Performed by: STUDENT IN AN ORGANIZED HEALTH CARE EDUCATION/TRAINING PROGRAM

## 2023-03-27 PROCEDURE — G8417 CALC BMI ABV UP PARAM F/U: HCPCS | Performed by: STUDENT IN AN ORGANIZED HEALTH CARE EDUCATION/TRAINING PROGRAM

## 2023-03-27 PROCEDURE — 4004F PT TOBACCO SCREEN RCVD TLK: CPT | Performed by: STUDENT IN AN ORGANIZED HEALTH CARE EDUCATION/TRAINING PROGRAM

## 2023-03-27 RX ORDER — CLONIDINE HYDROCHLORIDE 0.1 MG/1
0.1 TABLET ORAL 2 TIMES DAILY
Qty: 60 TABLET | Refills: 3 | Status: SHIPPED | OUTPATIENT
Start: 2023-03-27

## 2023-03-27 RX ORDER — DEXTROAMPHETAMINE SACCHARATE, AMPHETAMINE ASPARTATE, DEXTROAMPHETAMINE SULFATE AND AMPHETAMINE SULFATE 2.5; 2.5; 2.5; 2.5 MG/1; MG/1; MG/1; MG/1
10 TABLET ORAL DAILY
Qty: 30 TABLET | Refills: 0 | Status: SHIPPED | OUTPATIENT
Start: 2023-03-27 | End: 2023-04-26

## 2023-03-27 RX ORDER — LAMOTRIGINE 200 MG/1
200 TABLET ORAL DAILY
Qty: 60 TABLET | Refills: 2 | Status: SHIPPED | OUTPATIENT
Start: 2023-03-27

## 2023-03-27 RX ORDER — DEXTROAMPHETAMINE SACCHARATE, AMPHETAMINE ASPARTATE, DEXTROAMPHETAMINE SULFATE AND AMPHETAMINE SULFATE 3.75; 3.75; 3.75; 3.75 MG/1; MG/1; MG/1; MG/1
15 TABLET ORAL DAILY
Qty: 30 TABLET | Refills: 0 | Status: SHIPPED | OUTPATIENT
Start: 2023-03-27 | End: 2023-04-26

## 2023-03-27 NOTE — H&P
1/20/2022 11:55 AM         Past Medical History:   Diagnosis Date    ADHD (attention deficit hyperactivity disorder)     Bipolar disorder ([facility] [address]     Chiari malformation     Depression     Fibromyalgia     Pseudotumor cerebri        Past Surgical History:   Procedure Laterality Date    BACK INJECTION Bilateral 05/03/2022    bilateral sacroiliac joint injection.  performed by Clifton James DO at [facility] Bilateral 08/17/2022    bilateral sacroiliac joint injection performed by Clifton James DO at [facility]  01/01/2007    OTHER SURGICAL HISTORY  01/01/2007    lumbar shunt/ shunt    OTHER SURGICAL HISTORY      chiari decompression    PAIN MANAGEMENT PROCEDURE N/A 06/28/2022    lumbar epidural steroid injection Lumbar 4-5 performed by Clifton James DO at [facility] N/A 11/29/2022    lumbar epidural steroid injection Lumbar 4-5 performed by Clifton James DO at [facility] Right 01/18/2023    sacroiliac joint radiofrequency ablation,  RIGHT side first performed by Clifton James DO at [facility] N/A 2/14/2023    lumbar 4-5 epidural steroid injection performed by Clifton James DO at [address] Bilateral 2014    VENTRICULOPERITONEAL SHUNT         Family History   Problem Relation Age of Onset    Hypertension Mother     Alzheimer's Disease Father     Stroke Father     Heart Disease Father        Medications & Allergies:   Current Outpatient Medications   Medication Instructions    amphetamine-dextroamphetamine (ADDERALL, 10MG,) 10 MG tablet 10 mg, Oral, DAILY, (Take around 1 pm for concentration)    amphetamine-dextroamphetamine (ADDERALL, 15MG,) 15 MG tablet 15 mg, Oral, DAILY, (Take in the morning for concentration)    cloNIDine (CATAPRES) 0.1 mg, Oral, 2 TIMES DAILY, (Take 1 tablet in the

## 2023-03-27 NOTE — PROGRESS NOTES
[address]  [place] [address]  Dept: [phone]  Loc: [address] [facility]  1985  3/27/2023     F/U appt    TELEHEALTH EVALUATION -- Audio/Visual (During [id number] public health emergency)    Patient location: home  Physician location: office, [address]  This virtual visit was conducted via interactive, real-time video. DSM:  Dx:    ICD-10-CM    1. Concentration deficit  R41.840 amphetamine-dextroamphetamine (ADDERALL, 15MG,) 15 MG tablet     amphetamine-dextroamphetamine (ADDERALL, 10MG,) 10 MG tablet      2. Bipolar I disorder (HCC)  F31.9 lamoTRIgine (LAMICTAL) 200 MG tablet      3. Generalized anxiety disorder  F41.1 cloNIDine (CATAPRES) 0.1 MG tablet      4. Panic disorder  F41.0 cloNIDine (CATAPRES) 0.1 MG tablet           Interim Hx:  Chief Complaint:   Chief Complaint   Patient presents with    ADHD    Follow-up    Anxiety    Depression        HPI: Patient is a 40year old female who presents for follow up. Anxiety has improved with clonidine. She does admit to intermittently elevated anxiety and depression due to interpersonal conflict with family, but reports it is manageable and situational. She reports compliance and denies side effects. Energy and motivation have improved. Patient reports improved focus and concentration with adderall, but reports it tends to wear off by early afternoon. Her insurance will not cover the extended release formulation. Denies active and passive SI, HI, AH and VH. Hydroxyzine has improved sleep. ROS:   Gen: Denies F/C/N/V  HEENT: Denies Vision/hearing changes/sore throat  CV: Denies CP/Palp  Pulm: Denies SOB/Cough/Wheeze  GI: Denies Abd pain  Skin: Denies Rashes/Lumps/Bruises  Neurologic: Denies changes in memory/speech/mental status.  Denies h/o seizures/DTs   Psychiatric: + depression and anxiety     MSE:  Cognition - Alert and Oriented x

## 2023-03-28 ENCOUNTER — HOSPITAL ENCOUNTER (OUTPATIENT)
Age: 38
Setting detail: OUTPATIENT SURGERY
Discharge: HOME | End: 2023-03-28
Attending: ANESTHESIOLOGY | Admitting: ANESTHESIOLOGY
Payer: MEDICARE

## 2023-03-28 ENCOUNTER — APPOINTMENT (OUTPATIENT)
Dept: GENERAL RADIOLOGY | Age: 38
End: 2023-03-28
Attending: ANESTHESIOLOGY
Payer: MEDICARE

## 2023-03-28 VITALS
WEIGHT: 234.6 LBS | HEART RATE: 73 BPM | RESPIRATION RATE: 16 BRPM | SYSTOLIC BLOOD PRESSURE: 105 MMHG | BODY MASS INDEX: 40.05 KG/M2 | DIASTOLIC BLOOD PRESSURE: 52 MMHG | OXYGEN SATURATION: 99 % | HEIGHT: 64 IN | TEMPERATURE: 97 F

## 2023-03-28 PROCEDURE — 3600000056 HC PAIN LEVEL 4 BASE: Performed by: ANESTHESIOLOGY

## 2023-03-28 PROCEDURE — 64493 INJ PARAVERT F JNT L/S 1 LEV: CPT | Performed by: ANESTHESIOLOGY

## 2023-03-28 PROCEDURE — 7100000010 HC PHASE II RECOVERY - FIRST 15 MIN: Performed by: ANESTHESIOLOGY

## 2023-03-28 PROCEDURE — 2500000003 HC RX 250 WO HCPCS: Performed by: ANESTHESIOLOGY

## 2023-03-28 PROCEDURE — 64494 INJ PARAVERT F JNT L/S 2 LEV: CPT | Performed by: ANESTHESIOLOGY

## 2023-03-28 PROCEDURE — 3209999900 FLUORO FOR SURGICAL PROCEDURES

## 2023-03-28 PROCEDURE — 6360000002 HC RX W HCPCS: Performed by: ANESTHESIOLOGY

## 2023-03-28 PROCEDURE — 99152 MOD SED SAME PHYS/QHP 5/>YRS: CPT | Performed by: ANESTHESIOLOGY

## 2023-03-28 PROCEDURE — 7100000011 HC PHASE II RECOVERY - ADDTL 15 MIN: Performed by: ANESTHESIOLOGY

## 2023-03-28 PROCEDURE — 2709999900 HC NON-CHARGEABLE SUPPLY: Performed by: ANESTHESIOLOGY

## 2023-03-28 RX ORDER — FENTANYL CITRATE 50 UG/ML
INJECTION, SOLUTION INTRAMUSCULAR; INTRAVENOUS PRN
Status: DISCONTINUED | OUTPATIENT
Start: 2023-03-28 | End: 2023-03-28 | Stop reason: ALTCHOICE

## 2023-03-28 RX ORDER — LIDOCAINE HYDROCHLORIDE 10 MG/ML
INJECTION, SOLUTION EPIDURAL; INFILTRATION; INTRACAUDAL; PERINEURAL PRN
Status: DISCONTINUED | OUTPATIENT
Start: 2023-03-28 | End: 2023-03-28 | Stop reason: ALTCHOICE

## 2023-03-28 RX ORDER — MIDAZOLAM HYDROCHLORIDE 1 MG/ML
INJECTION INTRAMUSCULAR; INTRAVENOUS PRN
Status: DISCONTINUED | OUTPATIENT
Start: 2023-03-28 | End: 2023-03-28 | Stop reason: ALTCHOICE

## 2023-03-28 RX ORDER — BUPIVACAINE HYDROCHLORIDE 2.5 MG/ML
INJECTION, SOLUTION EPIDURAL; INFILTRATION; INTRACAUDAL PRN
Status: DISCONTINUED | OUTPATIENT
Start: 2023-03-28 | End: 2023-03-28 | Stop reason: ALTCHOICE

## 2023-03-28 ASSESSMENT — PAIN DESCRIPTION - DESCRIPTORS: DESCRIPTORS: ACHING;SHARP

## 2023-03-28 ASSESSMENT — PAIN - FUNCTIONAL ASSESSMENT
PAIN_FUNCTIONAL_ASSESSMENT: 0-10
PAIN_FUNCTIONAL_ASSESSMENT: PREVENTS OR INTERFERES SOME ACTIVE ACTIVITIES AND ADLS

## 2023-03-28 ASSESSMENT — PAIN SCALES - GENERAL: PAINLEVEL_OUTOF10: 2

## 2023-03-28 NOTE — PRE SEDATION
(ADVIL;MOTRIN) 800 MG tablet Take 1 tablet by mouth every 8 hours as needed for Pain 1/31/23 3/2/23  Darol Dose, APRN - CNP   hydrOXYzine pamoate (VISTARIL) 100 MG capsule Take 1 capsule by mouth 2 times daily as needed for Anxiety And/or sleep 1/10/23   Corrie Martin DO     PHYSICAL:   Vitals:    03/28/23 1301   BP: (!) 105/52   Pulse: 73   Resp: 16   Temp: 97 °F (36.1 °C)   SpO2: 99%     PLANNED PROCEDURE   See procedure note  SEDATION  Planned agent: Versed and Fentanyl  ASA Classification: 1  Class 1: A normal healthy patient  Class 2: Pt with mild to moderate systemic disease  Class 3: Severe systemic disease or disturbance  Class 4: Severe systemic disorders that are already life threatening. Class 5: Moribund pt with little chances of survival, for more than 24 hours. Mallampati I Airway Classification: 1    1. Pre-procedure diagnostic studies complete and results available. 2. Previous sedation/anesthesia experiences assessed. 3. The patient is an appropriate candidate to undergo the planned procedure sedation and anesthesia. (Refer to nursing sedation/analgesia documentation record)  4. Formulation and discussion of sedation/procedure plan, risks, and expectations with patient and/or responsible adult completed. 5. Patient examined immediately prior to the procedure.  (Refer to nursing sedation/analgesia documentation record)    Jorgito Starr DO  Electronically signed 3/28/2023 at 5:10 PM

## 2023-03-28 NOTE — PROGRESS NOTES
1300 Patient to phase 2 from surgery. Report from Celina Harper, [place]. Patient alert, oriented, appropriate. States pain is at pain goal. Injection site clean, dry, intact. Call light in reach. 1310 IV taken out and dressing applied with no complications. 1315 Patient walked out in stable condition. Discharged with AVS and all belongings.

## 2023-03-28 NOTE — PROCEDURES
Pre-operative Diagnosis: Lumbar facet pain     Post-operative Diagnosis: Lumbar facet pain     Procedure: Bilateral lumbar medial branch blocks targeting facet joints of L4/L5 and L5/S1     Procedure Description:  After consent was obtained, the patient was placed in the prone position with a pillow under the abdomen to reduce the lordotic curve of the lumbar spine. The lower back was prepped with chloraprep and draped in a sterile fashion. Then, 0.5 cc of 1 % lidocaine was used for local anesthesia of the skin and superficial subcutaneous tissues. Three 22-gauge 3.5-inch spinal needles were advanced under fluoroscopy in an AP view: one at the sacral ala and two at the junction of the right superior articular process and the transverse process of the L4 and L5 vertebrae. There was no paresthesias, heme, or CSF obtained. Needle placement was confirmed using AP and oblique views. Then, 0.5 cc of 0.5% bupivacaine was injected at each site without complications. The procedure was repeated on the contralateral side. The patient tolerated the procedure well, transported to the recovery room, and discharged in ambulatory fashion.      Procedural Complications: None  Estimated Blood Loss: 0 mL      IV sedation was used during the procedure:  - Moderate intravenous conscious sedation was supervised by Dr. Patricia Gonsales  - The patient was independently monitored by a Registered Nurse assigned to the procedure room  - Monitoring included automated blood pressure, continuous EKG, and continuous pulse oximetry  - The detailed conscious record is permanently stored in the [place] [id number]  - The following is the conscious sedation record:  Start Time: 12:52  End Time : 13:07  Duration: 15 minutes   Medications Administered: 2 mg Versed, 100 mcg Fentanyl     Marlon Mccarty DO  Interventional Pain Management/PM&R   [facility]

## 2023-03-28 NOTE — POST SEDATION
[facility]  Sedation/Analgesia Post Sedation Record    Pt Name: Cal Chiang  MRN: [id number]  YOB: 1985  Procedure Performed By: Trinity Elliott DO  Primary Care Physician: No primary care provider on file.     POST-PROCEDURE    Physicians/Assistants: Trinity Elliott DO  Procedure Performed: See Procedure Note   Sedation/Anesthesia: Versed and Fentanyl (See procedure note for amount and duration)  Estimated Blood Loss:     0  ml  Specimens Removed: None        Complications: None           Marlon Sawyer DO  Electronically signed 3/28/2023 at 5:12 PM

## 2023-04-04 ENCOUNTER — OFFICE VISIT (OUTPATIENT)
Dept: PHYSICAL MEDICINE AND REHAB | Age: 38
End: 2023-04-04
Payer: MEDICARE

## 2023-04-04 VITALS
HEIGHT: 64 IN | WEIGHT: 234 LBS | DIASTOLIC BLOOD PRESSURE: 84 MMHG | SYSTOLIC BLOOD PRESSURE: 108 MMHG | BODY MASS INDEX: 39.95 KG/M2

## 2023-04-04 DIAGNOSIS — M47.816 LUMBAR SPONDYLOSIS: Primary | ICD-10-CM

## 2023-04-04 DIAGNOSIS — M53.3 SACROILIAC JOINT DYSFUNCTION OF LEFT SIDE: ICD-10-CM

## 2023-04-04 DIAGNOSIS — M53.3 SACROILIAC JOINT DYSFUNCTION OF RIGHT SIDE: ICD-10-CM

## 2023-04-04 DIAGNOSIS — G89.4 CHRONIC PAIN SYNDROME: ICD-10-CM

## 2023-04-04 DIAGNOSIS — M47.819 FACET ARTHROPATHY: ICD-10-CM

## 2023-04-04 PROCEDURE — G8427 DOCREV CUR MEDS BY ELIG CLIN: HCPCS | Performed by: NURSE PRACTITIONER

## 2023-04-04 PROCEDURE — 4004F PT TOBACCO SCREEN RCVD TLK: CPT | Performed by: NURSE PRACTITIONER

## 2023-04-04 PROCEDURE — G8417 CALC BMI ABV UP PARAM F/U: HCPCS | Performed by: NURSE PRACTITIONER

## 2023-04-04 PROCEDURE — 99214 OFFICE O/P EST MOD 30 MIN: CPT | Performed by: NURSE PRACTITIONER

## 2023-04-04 RX ORDER — IBUPROFEN 800 MG/1
800 TABLET ORAL EVERY 8 HOURS PRN
Qty: 90 TABLET | Refills: 5 | Status: SHIPPED | OUTPATIENT
Start: 2023-04-04 | End: 2023-05-04

## 2023-04-04 NOTE — PROGRESS NOTES
mg, Oral, 2 TIMES DAILY, (Take 1 tablet in the morning and 1 tablet at bedtime for anxiety)    hydrOXYzine pamoate (VISTARIL) 100 mg, Oral, 2 TIMES DAILY PRN, And/or sleep    ibuprofen (ADVIL;MOTRIN) 800 mg, Oral, EVERY 8 HOURS PRN    lamoTRIgine (LAMICTAL) 200 mg, Oral, DAILY    methocarbamol (ROBAXIN) 500 mg, Oral, 2 TIMES DAILY PRN       No Known Allergies    Review of Systems:   Constitutional: negative for weight changes or fevers  Genitourinary: negative for bowel/bladder incontinence   Musculoskeletal: positive for low back pain, right leg pain  Neurological: negative for any leg weakness or numbness/tingling  Behavioral/Psych: negative for anxiety/depression   All other systems reviewed and are negative    Objective:     Vitals:    04/04/23 1126   BP: 108/84     Constitutional: Pleasant, no acute distress   Head: Normocephalic, atraumatic   Eyes: Conjunctivae normal   Neck: Supple, symmetrical   Lungs: Normal respiratory effort, non-labored breathing   Cardiovascular: Limbs warm and well perfused   Abdomen: Non-protruded   Musculoskeletal: Muscle bulk symmetric, no atrophy, no gross deformities   · Lower Extremities: ROM WNL. · Thorax: No paraspinal tenderness bilaterally. No scoliosis or kyphosis. · Lumbar Spine: ROM WNL. Lumbar paraspinals mildly tender to palpation bilaterally, right great than left. SLR positive bilaterally. ROSSY positive bilaterally (improved). GAENSLEN positive bilaterally (improved). Positive SI joint distraction bilaterally (improved). Positive facet loading bilaterally. Bilateral SI joints tender to palpation (improved). Bilateral greater trochanters non-tender to palpation. Neurological: Cranial nerves II-XII grossly intact. · Gait - Antalgic gait. Ambulates with assistive device (cane).    · Motor: 3/5 muscle strength in bilateral hip flexion, knee flexion, knee extension, ankle dorsiflexion, and ankle plantar flexion (pain limited)  · Sensory: LT sensation intact in lower

## 2023-04-18 ENCOUNTER — TELEPHONE (OUTPATIENT)
Dept: PHYSICAL MEDICINE AND REHAB | Age: 38
End: 2023-04-18

## 2023-04-27 ENCOUNTER — PREP FOR PROCEDURE (OUTPATIENT)
Dept: PHYSICAL MEDICINE AND REHAB | Age: 38
End: 2023-04-27

## 2023-04-27 DIAGNOSIS — R41.840 CONCENTRATION DEFICIT: ICD-10-CM

## 2023-04-27 RX ORDER — DEXTROAMPHETAMINE SACCHARATE, AMPHETAMINE ASPARTATE, DEXTROAMPHETAMINE SULFATE AND AMPHETAMINE SULFATE 3.75; 3.75; 3.75; 3.75 MG/1; MG/1; MG/1; MG/1
15 TABLET ORAL DAILY
Qty: 30 TABLET | Refills: 0 | Status: SHIPPED | OUTPATIENT
Start: 2023-04-27 | End: 2023-06-13

## 2023-04-27 RX ORDER — DEXTROAMPHETAMINE SACCHARATE, AMPHETAMINE ASPARTATE, DEXTROAMPHETAMINE SULFATE AND AMPHETAMINE SULFATE 2.5; 2.5; 2.5; 2.5 MG/1; MG/1; MG/1; MG/1
10 TABLET ORAL DAILY
Qty: 30 TABLET | Refills: 0 | Status: SHIPPED | OUTPATIENT
Start: 2023-04-27 | End: 2023-06-13

## 2023-05-02 ENCOUNTER — HOSPITAL ENCOUNTER (OUTPATIENT)
Age: 38
Setting detail: OUTPATIENT SURGERY
Discharge: HOME | End: 2023-05-02
Attending: ANESTHESIOLOGY | Admitting: ANESTHESIOLOGY
Payer: MEDICARE

## 2023-05-02 ENCOUNTER — APPOINTMENT (OUTPATIENT)
Dept: GENERAL RADIOLOGY | Age: 38
End: 2023-05-02
Attending: ANESTHESIOLOGY
Payer: MEDICARE

## 2023-05-02 VITALS
RESPIRATION RATE: 16 BRPM | OXYGEN SATURATION: 98 % | HEIGHT: 64 IN | HEART RATE: 83 BPM | TEMPERATURE: 97 F | WEIGHT: 238.4 LBS | BODY MASS INDEX: 40.7 KG/M2 | SYSTOLIC BLOOD PRESSURE: 120 MMHG | DIASTOLIC BLOOD PRESSURE: 65 MMHG

## 2023-05-02 PROCEDURE — 64493 INJ PARAVERT F JNT L/S 1 LEV: CPT | Performed by: ANESTHESIOLOGY

## 2023-05-02 PROCEDURE — 3600000056 HC PAIN LEVEL 4 BASE: Performed by: ANESTHESIOLOGY

## 2023-05-02 PROCEDURE — 2500000003 HC RX 250 WO HCPCS: Performed by: ANESTHESIOLOGY

## 2023-05-02 PROCEDURE — 99152 MOD SED SAME PHYS/QHP 5/>YRS: CPT | Performed by: ANESTHESIOLOGY

## 2023-05-02 PROCEDURE — 2709999900 HC NON-CHARGEABLE SUPPLY: Performed by: ANESTHESIOLOGY

## 2023-05-02 PROCEDURE — 7100000011 HC PHASE II RECOVERY - ADDTL 15 MIN: Performed by: ANESTHESIOLOGY

## 2023-05-02 PROCEDURE — 3209999900 FLUORO FOR SURGICAL PROCEDURES

## 2023-05-02 PROCEDURE — 7100000010 HC PHASE II RECOVERY - FIRST 15 MIN: Performed by: ANESTHESIOLOGY

## 2023-05-02 PROCEDURE — 64494 INJ PARAVERT F JNT L/S 2 LEV: CPT | Performed by: ANESTHESIOLOGY

## 2023-05-02 PROCEDURE — 6360000002 HC RX W HCPCS: Performed by: ANESTHESIOLOGY

## 2023-05-02 RX ORDER — MIDAZOLAM HYDROCHLORIDE 1 MG/ML
INJECTION INTRAMUSCULAR; INTRAVENOUS PRN
Status: DISCONTINUED | OUTPATIENT
Start: 2023-05-02 | End: 2023-05-02 | Stop reason: ALTCHOICE

## 2023-05-02 RX ORDER — FENTANYL CITRATE 50 UG/ML
INJECTION, SOLUTION INTRAMUSCULAR; INTRAVENOUS PRN
Status: DISCONTINUED | OUTPATIENT
Start: 2023-05-02 | End: 2023-05-02 | Stop reason: ALTCHOICE

## 2023-05-02 RX ORDER — LIDOCAINE HYDROCHLORIDE 10 MG/ML
INJECTION, SOLUTION EPIDURAL; INFILTRATION; INTRACAUDAL; PERINEURAL PRN
Status: DISCONTINUED | OUTPATIENT
Start: 2023-05-02 | End: 2023-05-02 | Stop reason: ALTCHOICE

## 2023-05-02 RX ORDER — BUPIVACAINE HYDROCHLORIDE 2.5 MG/ML
INJECTION, SOLUTION EPIDURAL; INFILTRATION; INTRACAUDAL PRN
Status: DISCONTINUED | OUTPATIENT
Start: 2023-05-02 | End: 2023-05-02 | Stop reason: ALTCHOICE

## 2023-05-02 ASSESSMENT — PAIN DESCRIPTION - DESCRIPTORS: DESCRIPTORS: ACHING

## 2023-05-02 ASSESSMENT — PAIN SCALES - GENERAL: PAINLEVEL_OUTOF10: 0

## 2023-05-02 ASSESSMENT — PAIN - FUNCTIONAL ASSESSMENT: PAIN_FUNCTIONAL_ASSESSMENT: 0-10

## 2023-05-02 NOTE — PROGRESS NOTES
1113-Patient to Phase II. Report received from surgical RN. Patient awake and alert. Vital signs obtained and stable. Respirations unlabored on room air. Patient denies pain and nausea. Denies numbness and tingling in extremities. Injection site open to air and no drainage noted. Patient instructed to stay in bed. Call light in reach.      1120-snack and drink provided and tolerating well    1125-IV removed, pt getting dressed and ride notified    1130-Pt discharged home with friend in stable condition

## 2023-05-02 NOTE — POST SEDATION
[facility]  Sedation/Analgesia Post Sedation Record    Pt Name: Jerry Roy  MRN: [id number]  YOB: 1985  Procedure Performed By: Samira Bennett DO  Primary Care Physician: No primary care provider on file.     POST-PROCEDURE    Physicians/Assistants: Samira Bennett DO  Procedure Performed: See Procedure Note   Sedation/Anesthesia: Versed and Fentanyl (See procedure note for amount and duration)  Estimated Blood Loss:     0  ml  Specimens Removed: None        Complications: None           Marlon Meyer DO  Electronically signed 5/2/2023 at 12:03 PM

## 2023-05-02 NOTE — PRE SEDATION
[address] [address]  Pre-Sedation/Analgesia History & Physical    Pt Name: Juan Golden  MRN: [id number]  YOB: 1985  Provider Performing Procedure: Maddy Gipson DO   Primary Care Physician: No primary care provider on file. MEDICAL HISTORY       has a past medical history of ADHD (attention deficit hyperactivity disorder), Bipolar disorder ([facility] [address] Chiari malformation, Depression, Fibromyalgia, and Pseudotumor cerebri. SURGICAL HISTORY   has a past surgical history that includes Cholecystectomy (01/01/2007); other surgical history (01/01/2007); other surgical history; Ventriculoperitoneal shunt; Back Injection (Bilateral, 05/03/2022); Pain management procedure (N/A, 06/28/2022); Back Injection (Bilateral, 08/17/2022); Pain management procedure (N/A, 11/29/2022); Pain management procedure (Right, 01/18/2023); Tubal ligation (Bilateral, 2014); Pain management procedure (N/A, 2/14/2023); and Pain management procedure (Bilateral, 3/28/2023). ALLERGIES   Allergies as of 04/04/2023    (No Known Allergies)       MEDICATIONS   Prior to Admission medications    Medication Sig Start Date End Date Taking? Authorizing Provider   amphetamine-dextroamphetamine (ADDERALL, 15MG,) 15 MG tablet Take 1 tablet by mouth daily for 30 days. (Take in the morning for concentration) Max Daily Amount: 15 mg 4/27/23 5/27/23  Alvino Rivera MD   amphetamine-dextroamphetamine (ADDERALL, 10MG,) 10 MG tablet Take 1 tablet by mouth daily for 30 days.  (Take around 1 pm for concentration) Max Daily Amount: 10 mg 4/27/23 5/27/23  Alvino Rivera MD   ibuprofen (ADVIL;MOTRIN) 800 MG tablet Take 1 tablet by mouth every 8 hours as needed for Pain 4/4/23 5/4/23  LIBAN Gardner - CNP   lamoTRIgine (LAMICTAL) 200 MG tablet Take 1 tablet by mouth daily 3/27/23   Juan Antonio Arroyo DO   cloNIDine (CATAPRES) 0.1 MG tablet Take 1 tablet by mouth 2 times daily (Take 1 tablet in the morning and 1 tablet at bedtime for

## 2023-05-02 NOTE — PROCEDURES
Pre-operative Diagnosis: Lumbar facet pain     Post-operative Diagnosis: Lumbar facet pain     Procedure: Bilateral lumbar medial branch blocks targeting facet joints of L4/L5 and L5/S1     Procedure Description:  After consent was obtained, the patient was placed in the prone position with a pillow under the abdomen to reduce the lordotic curve of the lumbar spine. The lower back was prepped with chloraprep and draped in a sterile fashion. Then, 0.5 cc of 1 % lidocaine was used for local anesthesia of the skin and superficial subcutaneous tissues. Three 22-gauge 5-inch spinal needles were advanced under fluoroscopy in an AP view: one at the sacral ala and two at the junction of the right superior articular process and the transverse process of the L4 and L5 vertebrae. There was no paresthesias, heme, or CSF obtained. Needle placement was confirmed using AP and oblique views. Then, 0.5 cc of 0.5% bupivacaine was injected at each site without complications. The procedure was repeated on the contralateral side. The patient tolerated the procedure well, transported to the recovery room, and discharged in ambulatory fashion.      Procedural Complications: None  Estimated Blood Loss: 0 mL        IV sedation was used during the procedure:  - Moderate intravenous conscious sedation was supervised by Dr. Mary Neil  - The patient was independently monitored by a Registered Nurse assigned to the procedure room  - Monitoring included automated blood pressure, continuous EKG, and continuous pulse oximetry  - The detailed conscious record is permanently stored in the [address] [id number]  - The following is the conscious sedation record:  Start Time: 11:04  End Time : 11:19  Duration: 15 minutes   Medications Administered: 2 mg Versed, 100 mcg Fentanyl     Marlon Mccarty DO  Interventional Pain Management/PM&R   [place]

## 2023-05-10 ENCOUNTER — OFFICE VISIT (OUTPATIENT)
Dept: PHYSICAL MEDICINE AND REHAB | Age: 38
End: 2023-05-10
Payer: MEDICARE

## 2023-05-10 VITALS
HEIGHT: 64 IN | SYSTOLIC BLOOD PRESSURE: 116 MMHG | WEIGHT: 238 LBS | DIASTOLIC BLOOD PRESSURE: 68 MMHG | BODY MASS INDEX: 40.63 KG/M2

## 2023-05-10 DIAGNOSIS — M79.2 NEUROPATHIC PAIN: ICD-10-CM

## 2023-05-10 DIAGNOSIS — G89.4 CHRONIC PAIN SYNDROME: Primary | ICD-10-CM

## 2023-05-10 DIAGNOSIS — M53.3 SACROILIAC JOINT DYSFUNCTION OF RIGHT SIDE: ICD-10-CM

## 2023-05-10 DIAGNOSIS — M47.816 LUMBAR SPONDYLOSIS: ICD-10-CM

## 2023-05-10 PROCEDURE — G8417 CALC BMI ABV UP PARAM F/U: HCPCS | Performed by: NURSE PRACTITIONER

## 2023-05-10 PROCEDURE — G8427 DOCREV CUR MEDS BY ELIG CLIN: HCPCS | Performed by: NURSE PRACTITIONER

## 2023-05-10 PROCEDURE — 99214 OFFICE O/P EST MOD 30 MIN: CPT | Performed by: NURSE PRACTITIONER

## 2023-05-10 PROCEDURE — 4004F PT TOBACCO SCREEN RCVD TLK: CPT | Performed by: NURSE PRACTITIONER

## 2023-05-10 RX ORDER — METHOCARBAMOL 500 MG/1
500 TABLET, FILM COATED ORAL 2 TIMES DAILY PRN
Qty: 60 TABLET | Refills: 2 | Status: SHIPPED | OUTPATIENT
Start: 2023-05-10 | End: 2023-06-09

## 2023-05-10 NOTE — PROGRESS NOTES
Bilateral 5/2/2023    medial branch blocks  bilateral Lumbar 4/5, 5/Sacral 1 performed by Nito Alvarez DO at [address] Bilateral 2014    VENTRICULOPERITONEAL SHUNT         Family History   Problem Relation Age of Onset    Hypertension Mother     Alzheimer's Disease Father     Stroke Father     Heart Disease Father        Medications & Allergies:   Current Outpatient Medications   Medication Instructions    amphetamine-dextroamphetamine (ADDERALL, 10MG,) 10 MG tablet 10 mg, Oral, DAILY, (Take around 1 pm for concentration)    amphetamine-dextroamphetamine (ADDERALL, 15MG,) 15 MG tablet 15 mg, Oral, DAILY, (Take in the morning for concentration)    cloNIDine (CATAPRES) 0.1 mg, Oral, 2 TIMES DAILY, (Take 1 tablet in the morning and 1 tablet at bedtime for anxiety)    hydrOXYzine pamoate (VISTARIL) 100 mg, Oral, 2 TIMES DAILY PRN, And/or sleep    ibuprofen (ADVIL;MOTRIN) 800 mg, Oral, EVERY 8 HOURS PRN    lamoTRIgine (LAMICTAL) 200 mg, Oral, DAILY    methocarbamol (ROBAXIN) 500 mg, Oral, 2 TIMES DAILY PRN       No Known Allergies    Review of Systems:   Constitutional: negative for weight changes or fevers  Genitourinary: negative for bowel/bladder incontinence   Musculoskeletal: positive for low back pain, right leg pain  Neurological: negative for any leg weakness or numbness/tingling  Behavioral/Psych: negative for anxiety/depression   All other systems reviewed and are negative    Objective:     Vitals:    05/10/23 1449   BP: 116/68     Constitutional: Pleasant, no acute distress   Head: Normocephalic, atraumatic   Eyes: Conjunctivae normal   Neck: Supple, symmetrical   Lungs: Normal respiratory effort, non-labored breathing   Cardiovascular: Limbs warm and well perfused   Abdomen: Non-protruded   Musculoskeletal: Muscle bulk symmetric, no atrophy, no gross deformities   · Lower Extremities: ROM WNL. · Thorax: No paraspinal tenderness bilaterally. No scoliosis or kyphosis.    ·

## 2023-05-11 ENCOUNTER — PREP FOR PROCEDURE (OUTPATIENT)
Dept: PHYSICAL MEDICINE AND REHAB | Age: 38
End: 2023-05-11

## 2023-05-18 ENCOUNTER — TELEPHONE (OUTPATIENT)
Dept: PHYSICAL MEDICINE AND REHAB | Age: 38
End: 2023-05-18

## 2023-05-22 NOTE — DISCHARGE INSTRUCTIONS
Post procedure Instructions:    No driving or making significant decisions for the remainder of the day. Be cautions with walking and activity for 24 hours, do not over exert yourself. Ok to resume pre-procedure activity level today. Apply ice to site of injection site if you have pain or discomfort. Do not submerge sit of injection during bath or pool activities for 48 hours post-procedure. Resume blood thinning medications in 24 hours. Call office [phone] if you have:  Temperature greater than 100.4  Persistent nausea and vomiting  Severe uncontrolled pain  Redness, tenderness, or signs of infection (pain, swelling, redness, odor or green/yellow discharge around the site)  Difficulty breathing, headache or visual disturbances  Hives  Persistent dizziness or light-headedness  Extreme fatigue  Any other questions or concerns you may have after discharge    In an emergency, call 911 or go to an Emergency Department at a nearby hospital    Surgical Site Infections      How can we work together to prevent Surgical Site Infections? We would like to thank you for choosing [facility] for your Surgical Care. Below you will find helpful information on how we can work together to prevent Surgical Site Infections. What is a Surgical Site Infection (SSI)? A surgical site infection is an infection that occurs after surgery in the part of the body where the surgery took place. Most patients who have surgery do not develop an infection. However, infections develop in about 1 to 3 out of every 100 patients who have surgery. Some of the common symptoms of a surgical site infection are:  Redness and pain around the area where you had surgery  Drainage of cloudy fluid from your surgical wound  Fever    Can SSIs be treated? Yes. Most surgical site infections can be treated with antibiotics. The antibiotic given to you depends on the bacteria (germs) causing the infection.  Sometimes patients with

## 2023-05-25 ENCOUNTER — HOSPITAL ENCOUNTER (OUTPATIENT)
Age: 38
Setting detail: OUTPATIENT SURGERY
Discharge: HOME | End: 2023-05-25
Attending: ANESTHESIOLOGY | Admitting: ANESTHESIOLOGY
Payer: MEDICARE

## 2023-05-25 ENCOUNTER — APPOINTMENT (OUTPATIENT)
Dept: GENERAL RADIOLOGY | Age: 38
End: 2023-05-25
Attending: ANESTHESIOLOGY
Payer: MEDICARE

## 2023-05-25 VITALS
DIASTOLIC BLOOD PRESSURE: 56 MMHG | HEIGHT: 64 IN | OXYGEN SATURATION: 99 % | HEART RATE: 71 BPM | WEIGHT: 235 LBS | BODY MASS INDEX: 40.12 KG/M2 | RESPIRATION RATE: 16 BRPM | SYSTOLIC BLOOD PRESSURE: 118 MMHG | TEMPERATURE: 97.4 F

## 2023-05-25 PROCEDURE — 2500000003 HC RX 250 WO HCPCS: Performed by: ANESTHESIOLOGY

## 2023-05-25 PROCEDURE — 3600000054 HC PAIN LEVEL 3 BASE: Performed by: ANESTHESIOLOGY

## 2023-05-25 PROCEDURE — 99152 MOD SED SAME PHYS/QHP 5/>YRS: CPT | Performed by: ANESTHESIOLOGY

## 2023-05-25 PROCEDURE — 2709999900 HC NON-CHARGEABLE SUPPLY: Performed by: ANESTHESIOLOGY

## 2023-05-25 PROCEDURE — 6360000002 HC RX W HCPCS: Performed by: ANESTHESIOLOGY

## 2023-05-25 PROCEDURE — 7100000010 HC PHASE II RECOVERY - FIRST 15 MIN: Performed by: ANESTHESIOLOGY

## 2023-05-25 PROCEDURE — 3209999900 FLUORO FOR SURGICAL PROCEDURES

## 2023-05-25 PROCEDURE — 7100000011 HC PHASE II RECOVERY - ADDTL 15 MIN: Performed by: ANESTHESIOLOGY

## 2023-05-25 RX ORDER — FENTANYL CITRATE 50 UG/ML
INJECTION, SOLUTION INTRAMUSCULAR; INTRAVENOUS PRN
Status: DISCONTINUED | OUTPATIENT
Start: 2023-05-25 | End: 2023-05-25 | Stop reason: ALTCHOICE

## 2023-05-25 RX ORDER — LIDOCAINE HYDROCHLORIDE 10 MG/ML
INJECTION, SOLUTION EPIDURAL; INFILTRATION; INTRACAUDAL; PERINEURAL PRN
Status: DISCONTINUED | OUTPATIENT
Start: 2023-05-25 | End: 2023-05-25 | Stop reason: ALTCHOICE

## 2023-05-25 RX ORDER — LIDOCAINE HYDROCHLORIDE 20 MG/ML
INJECTION, SOLUTION EPIDURAL; INFILTRATION; INTRACAUDAL; PERINEURAL PRN
Status: DISCONTINUED | OUTPATIENT
Start: 2023-05-25 | End: 2023-05-25 | Stop reason: ALTCHOICE

## 2023-05-25 RX ORDER — MIDAZOLAM HYDROCHLORIDE 1 MG/ML
INJECTION INTRAMUSCULAR; INTRAVENOUS PRN
Status: DISCONTINUED | OUTPATIENT
Start: 2023-05-25 | End: 2023-05-25 | Stop reason: ALTCHOICE

## 2023-05-25 ASSESSMENT — PAIN DESCRIPTION - DESCRIPTORS: DESCRIPTORS: SHOOTING;ACHING

## 2023-05-25 ASSESSMENT — PAIN - FUNCTIONAL ASSESSMENT: PAIN_FUNCTIONAL_ASSESSMENT: 0-10

## 2023-05-25 NOTE — PRE SEDATION
[address] [address]  Pre-Sedation/Analgesia History & Physical    Pt Name: Ortega Lipscomb  MRN: [id number]  YOB: 1985  Provider Performing Procedure: Nancy Kennedy DO   Primary Care Physician: No primary care provider on file. MEDICAL HISTORY       has a past medical history of ADHD (attention deficit hyperactivity disorder), Bipolar disorder ([address] Chiari malformation, Depression, Fibromyalgia, and Pseudotumor cerebri. SURGICAL HISTORY   has a past surgical history that includes Cholecystectomy (01/01/2007); other surgical history (01/01/2007); other surgical history; Ventriculoperitoneal shunt; Back Injection (Bilateral, 05/03/2022); Pain management procedure (N/A, 06/28/2022); Back Injection (Bilateral, 08/17/2022); Pain management procedure (N/A, 11/29/2022); Pain management procedure (Right, 01/18/2023); Tubal ligation (Bilateral, 2014); Pain management procedure (N/A, 2/14/2023); Pain management procedure (Bilateral, 3/28/2023); and Pain management procedure (Bilateral, 5/2/2023). ALLERGIES   Allergies as of 05/10/2023    (No Known Allergies)       MEDICATIONS   Prior to Admission medications    Medication Sig Start Date End Date Taking? Authorizing Provider   methocarbamol (ROBAXIN) 500 MG tablet Take 1 tablet by mouth 2 times daily as needed (pain) 5/10/23 6/9/23  LIBAN Wolff CNP   amphetamine-dextroamphetamine (ADDERALL, 15MG,) 15 MG tablet Take 1 tablet by mouth daily for 30 days. (Take in the morning for concentration) Max Daily Amount: 15 mg 4/27/23 5/27/23  Denise Cook MD   amphetamine-dextroamphetamine (ADDERALL, 10MG,) 10 MG tablet Take 1 tablet by mouth daily for 30 days.  (Take around 1 pm for concentration) Max Daily Amount: 10 mg 4/27/23 5/27/23  Denise Cook MD   ibuprofen (ADVIL;MOTRIN) 800 MG tablet Take 1 tablet by mouth every 8 hours as needed for Pain 4/4/23 5/4/23  LIBAN Wolff CNP   lamoTRIgine (LAMICTAL) 200 MG tablet

## 2023-05-25 NOTE — PROCEDURES
Pre-operative Diagnosis: Lumbar facet pain     Post-operative Diagnosis: Lumbar facet pain     Procedure: RIGHT lumbar thermal radiofrequency ablation targeting facet joints L4/L5 and L5/S1    Procedure Description:  After consent was obtained, the patient was placed in the prone position with a pillow under the abdomen to reduce the lordotic curve of the lumbar spine. The lower back was prepped with chloraprep and draped in a sterile fashion. Then, 0.5 cc of 1 % lidocaine was used for local anesthesia of the skin and superficial subcutaneous tissues. Three 20-gauge 100mm SMK cannulas with 10-mm active tips were advanced under fluoroscopic guidance in an AP view to the junction of the superior articular process and the transverse process of the L4 and L5 vertebra and at the sacral ala. There were no paresthesias, heme or CSF obtained. Needle placement was confirmed using AP and oblique views. Sensory and motor stimulation at 50Hz and 2Hz and impedance measurements were carried out having reached threshold at:     RIGHT  L4: 0.2V/3V/150-300 Ohms  L5: 0.2V/3V/150-300 Ohms  SA: 0.2V/3V/150-300 Ohms        Then, 1cc of 2% Lidocaine was injected at the site. Temperature was then raised to 80 degrees centigrade for 90 seconds with a 15 second temperature ramp. No pain was reported during the lesioning. The needles were then withdrawn without complications. The patient tolerated the procedure well. The patient was transported to the recovery room and discharged in ambulatory fashion.     Procedural Complications: None  Estimated Blood Loss: 0 mL    IV sedation was used during the procedure:  - Moderate intravenous conscious sedation was supervised by Dr. Mary Neil  - The patient was independently monitored by a Registered Nurse assigned to the procedure room  - Monitoring included automated blood pressure, continuous EKG, and continuous pulse oximetry  - The detailed conscious record is permanently stored in the

## 2023-05-25 NOTE — H&P
Today, patient presents for planned lumbar radiofrequency ablation at RIGHT L4/5 and L5/S1    This note is reflective of the patient's previous visit for evaluation. We will proceed with today's planned procedure. Since patient's last visit for evaluation, there have been no interval changes in medical history. Patient has no new numbness, weakness, or focal neurological deficit since evaluation. Patient has no contraindications to injection (no anticoagulation or recent antibiotic intake for active infections), and has a  present or is able to drive themselves (as discussed and cleared by physician). Allergies to latex, contrast dye, and steroid medications have been confirmed with the patient prior to the procedure. NPO necessity has been assessed and accepted based on procedure complexity. The risks and benefits of the procedure have been explained including but are not limited to infection, bleeding, paralysis, immediate post procedure weakness, and dizziness; the patient acknowledges understanding and desires to proceed with the procedure. Patient has signed consent for same procedure as discussed in previous clinic encounter. All other questions and concerns were addressed at bedside. See procedure note for full details. Post procedure Instructions: The patient was advised not to drive during the day of the procedure and not to engage in any significant decision making (unless otherwise states by physician). The patient was also advised to be cautious with walking/activity for 24 hours following today's visit and asked not to engage in over-exertion (unless otherwise states by physician). After this time, it is ok to resume pre-procedure level of activity. Patient advised to apply ice to site of injection in situations of pain and discomfort. Patient advised to not submerge site of injection during bath or pool activities for approximately 24 hours post-procedure.  Patient attested to

## 2023-05-25 NOTE — POST SEDATION
[address] [address]  Sedation/Analgesia Post Sedation Record    Pt Name: Emilia Caballero  MRN: [id number]  YOB: 1985  Procedure Performed By: Kaila Rodriguez DO  Primary Care Physician: No primary care provider on file.     POST-PROCEDURE    Physicians/Assistants: Kaila Rodriguez DO  Procedure Performed: See Procedure Note   Sedation/Anesthesia: Versed and Fentanyl (See procedure note for amount and duration)  Estimated Blood Loss:     0  ml  Specimens Removed: None        Complications: None           Marlon Ramirez DO  Electronically signed 5/25/2023 at 1:23 PM

## 2023-05-25 NOTE — PROGRESS NOTES
1029 Patient arrives to recovery room via cart. Spontaneous respirations, vss, report received from surgical RN. Patient denies pain, numbness, tingling , nausea. Injection site clean, dry, intact. HOB elevated. IV capped. Snack and drink given. Call light within reach. 1040 IV discontinued.  Up to dress self  1045 Discharge to home in stable ambulatory condition with

## 2023-06-28 ENCOUNTER — TELEPHONE (OUTPATIENT)
Dept: PHYSICAL MEDICINE AND REHAB | Age: 38
End: 2023-06-28

## 2023-07-13 ENCOUNTER — TELEPHONE (OUTPATIENT)
Dept: PHYSICAL MEDICINE AND REHAB | Age: 38
End: 2023-07-13

## 2023-07-13 NOTE — TELEPHONE ENCOUNTER
Please update 6/13 OV to include RFA percentage of relief. Called patient she stated 75-80% relief.   Thank you

## 2023-07-20 ENCOUNTER — TELEPHONE (OUTPATIENT)
Dept: PHYSICAL MEDICINE AND REHAB | Age: 38
End: 2023-07-20

## 2023-08-03 ENCOUNTER — TELEPHONE (OUTPATIENT)
Dept: PHYSICAL MEDICINE AND REHAB | Age: 38
End: 2023-08-03

## 2023-08-03 NOTE — TELEPHONE ENCOUNTER
Called and lvm for pt to return my call, I had to move her procedure out a few weeks due to insurance issues.

## 2023-11-28 RX ORDER — IBUPROFEN 800 MG/1
800 TABLET ORAL EVERY 8 HOURS PRN
Qty: 90 TABLET | Refills: 5 | Status: SHIPPED | OUTPATIENT
Start: 2023-11-28 | End: 2024-05-26

## 2024-05-10 RX ORDER — IBUPROFEN 800 MG/1
800 TABLET ORAL EVERY 8 HOURS PRN
Qty: 90 TABLET | Refills: 5 | OUTPATIENT
Start: 2024-05-10 | End: 2024-11-06

## 2025-05-05 ENCOUNTER — OFFICE VISIT (OUTPATIENT)
Dept: PHYSICAL MEDICINE AND REHAB | Age: 40
End: 2025-05-05
Payer: MEDICARE

## 2025-05-05 VITALS
SYSTOLIC BLOOD PRESSURE: 106 MMHG | DIASTOLIC BLOOD PRESSURE: 76 MMHG | WEIGHT: 232 LBS | HEIGHT: 64 IN | BODY MASS INDEX: 39.61 KG/M2

## 2025-05-05 DIAGNOSIS — M47.816 LUMBAR SPONDYLOSIS: Primary | ICD-10-CM

## 2025-05-05 DIAGNOSIS — G89.4 CHRONIC PAIN SYNDROME: ICD-10-CM

## 2025-05-05 DIAGNOSIS — M54.17 RADICULOPATHY, LUMBOSACRAL REGION: ICD-10-CM

## 2025-05-05 DIAGNOSIS — M47.819 FACET ARTHROPATHY: ICD-10-CM

## 2025-05-05 PROCEDURE — G8427 DOCREV CUR MEDS BY ELIG CLIN: HCPCS | Performed by: NURSE PRACTITIONER

## 2025-05-05 PROCEDURE — 4004F PT TOBACCO SCREEN RCVD TLK: CPT | Performed by: NURSE PRACTITIONER

## 2025-05-05 PROCEDURE — G8419 CALC BMI OUT NRM PARAM NOF/U: HCPCS | Performed by: NURSE PRACTITIONER

## 2025-05-05 PROCEDURE — 99215 OFFICE O/P EST HI 40 MIN: CPT | Performed by: NURSE PRACTITIONER

## 2025-05-05 RX ORDER — IBUPROFEN 200 MG
400 TABLET ORAL EVERY 6 HOURS PRN
COMMUNITY

## 2025-05-05 RX ORDER — ACETAMINOPHEN 325 MG/1
650 TABLET ORAL EVERY 6 HOURS PRN
COMMUNITY

## 2025-05-05 RX ORDER — OMEPRAZOLE 20 MG/1
20 CAPSULE, DELAYED RELEASE ORAL DAILY
COMMUNITY

## 2025-05-05 NOTE — PROGRESS NOTES
Functionality Assessment/Goals Worksheet     On a scale of 0 (Does not Interfere) to 10 (Completely Interferes)     1.  Which number describes how during the past week pain has interfered with           the following:  A.  General Activity:  9  B.  Mood: 8  C.  Walking Ability:  8  D.  Normal Work (Includes both work outside the home and housework):  9  E.  Relations with Other People:   6  F.  Sleep:   6  G.  Enjoyment of Life:   10    2.  Patient Prefers to Take their Pain Medications:     []  On a regular basis   []  Only when necessary    [x]  Does not take pain medications    3.  What are the Patient's Goals/Expectations for Visiting Pain Management?     []  Learn about my pain    []  Receive Medication   []  Physical Therapy     []  Treat Depression   [x]  Receive Injections    []  Treat Sleep   []  Deal with Anxiety and Stress   []  Treat Opoid Dependence/Addiction   []  Other:

## 2025-05-05 NOTE — PROGRESS NOTES
Chronic Pain/PM&R Clinic Note     Encounter Date: 5/5/25    Subjective:   Chief Complaint:   Chief Complaint   Patient presents with    Pain     Reestablish care.        History of Present Illness:   Leanne Diaz is a 40 y.o. female seen in the clinic initially on 04/11/2022 upon request from Mario Ohara PA  for her history of low back pain.  States she has had low back pain for several years but it has been getting significantly worse in the last year, especially since October 2021.  Patient denies any prior events that may have caused aggravation of her pain.  Patient states she tries to stay active but has been very difficult due to her pain level.  She states she hears grinding in her right low back.  She states pain is mainly located right low back radiating down her right leg to her right knee.  She will occasionally get pain that shoots down to her right foot, this is dependent on how active she is.  She has recently noticed some pain in her left low back radiating into her left leg.  Patient states her pain is worse with increased activity and better at rest.  Patient states she uses a cane to help take the pressure off her back.  When she is out shopping she has to use a cart to help take the pressure off her back.  Patient currently walks with a cane outside of the home.  She states she did fall last week when crouching down under a desk and lost her balance.  She does state she feels like she is off balance frequently.  Patient states she has trouble sleeping because she cannot lay in 1 position for very long without having pain.  Patient states she will sometimes get pressure in her right hip and it feels like it is swollen.  Patient states she did physical therapy a few years back which was ineffective and almost made things worse.  Patient denies any bowel/bladder incontinence or saddle anesthesia.  Patient currently moved back here from [place] within the last few months and she has

## 2025-05-12 ENCOUNTER — TELEPHONE (OUTPATIENT)
Dept: PHYSICAL MEDICINE AND REHAB | Age: 40
End: 2025-05-12

## 2025-05-12 NOTE — TELEPHONE ENCOUNTER
05/21/25 Right lumbar 4/5, 5/Sacroal 1 radiofrequency ablation pending denial additional documentation requested.     Reason:      Please update note to resubmit for authorization.

## 2025-05-12 NOTE — DISCHARGE INSTRUCTIONS
Post procedure Instructions:    No driving or making significant decisions for the remainder of the day.   Be cautions with walking and activity for 24 hours, do not over exert yourself.  Ok to resume pre-procedure activity level today.  Apply ice to site of injection site if you have pain or discomfort.  Do not submerge sit of injection during bath or pool activities for 48 hours post-procedure.  Resume blood thinning medications in 24 hours.     Call office [phone] if you have:  Temperature greater than 100.4  Persistent nausea and vomiting  Severe uncontrolled pain  Redness, tenderness, or signs of infection (pain, swelling, redness, odor or green/yellow discharge around the site)  Difficulty breathing, headache or visual disturbances  Hives  Persistent dizziness or light-headedness  Extreme fatigue  Any other questions or concerns you may have after discharge    In an emergency, call 911 or go to an Emergency Department at a nearby hospital    “Surgical Site Infections”      How can we work together to prevent Surgical Site Infections?   We would like to thank you for choosing [facility] for your Surgical Care.  Below you will find helpful information on how we can work together to prevent Surgical Site Infections.    What is a Surgical Site Infection (SSI)?  A surgical site infection is an infection that occurs after surgery in the part of the body where the surgery took place. Most patients who have surgery do not develop an infection. However, infections develop in about 1 to 3 out of every 100 patients who have surgery.  Some of the common symptoms of a surgical site infection are:  Redness and pain around the area where you had surgery  Drainage of cloudy fluid from your surgical wound  Fever    Can SSIs be treated?  Yes. Most surgical site infections can be treated with antibiotics. The antibiotic given to you depends on the bacteria (germs) causing the infection. Sometimes patients with

## 2025-05-20 NOTE — H&P
back pain and with physical exam consistent for facetal pain, the option of lumbar radiofrequency ablation at RIGHT lumbar 4/5 and Lumbar 5/S1 was chosen. The risks and benefits were discussed in detail with the patient. Patient wants to proceed with the injection.    Anticoagulation/NPO Recommendations:   Patient does need to hold Ibuprofen x 2 days prior to the procedure.  Patient does need to be NPO x8 hours prior to the procedure.  We will start an IV for the procedure  Patient will need a     Multidisciplinary Pain Management:   In the presence of complex, chronic, and multi-factorial pain, the importance of a multidisciplinary approach to pain management in the patient’s management regimen was emphasized and discussed in great detail.   PHYSICAL THERAPY: Patient is advised to see a physical therapist for gentle stretching exercises and conditioning exercises for management of pain.   PSYCHOLOGY: Patient is advised to see a clinical pain psychologist, for the psychosocial aspect of pain care through coping skills, relaxation strategies, cognitive group therapy etc.     Referrals:  Lumbar xray     Prescriptions Written This Visit:   Lumbar back brace     Follow-up: RIGHT lumbar RFA, in office 6 weeks after    Marlon Mccarty DO

## 2025-05-21 ENCOUNTER — HOSPITAL ENCOUNTER (OUTPATIENT)
Age: 40
Setting detail: OUTPATIENT SURGERY
Discharge: HOME | End: 2025-05-21
Attending: ANESTHESIOLOGY | Admitting: ANESTHESIOLOGY
Payer: MEDICARE

## 2025-05-21 ENCOUNTER — APPOINTMENT (OUTPATIENT)
Dept: GENERAL RADIOLOGY | Age: 40
End: 2025-05-21
Attending: ANESTHESIOLOGY
Payer: MEDICARE

## 2025-05-21 VITALS
SYSTOLIC BLOOD PRESSURE: 100 MMHG | BODY MASS INDEX: 39.71 KG/M2 | RESPIRATION RATE: 16 BRPM | HEART RATE: 70 BPM | HEIGHT: 64 IN | OXYGEN SATURATION: 98 % | TEMPERATURE: 96.8 F | WEIGHT: 232.6 LBS | DIASTOLIC BLOOD PRESSURE: 55 MMHG

## 2025-05-21 PROCEDURE — 6360000002 HC RX W HCPCS: Performed by: ANESTHESIOLOGY

## 2025-05-21 PROCEDURE — 64635 DESTROY LUMB/SAC FACET JNT: CPT | Performed by: ANESTHESIOLOGY

## 2025-05-21 PROCEDURE — 7100000010 HC PHASE II RECOVERY - FIRST 15 MIN: Performed by: ANESTHESIOLOGY

## 2025-05-21 PROCEDURE — 3600000054 HC PAIN LEVEL 3 BASE: Performed by: ANESTHESIOLOGY

## 2025-05-21 PROCEDURE — 64636 DESTROY L/S FACET JNT ADDL: CPT | Performed by: ANESTHESIOLOGY

## 2025-05-21 PROCEDURE — 99152 MOD SED SAME PHYS/QHP 5/>YRS: CPT | Performed by: ANESTHESIOLOGY

## 2025-05-21 PROCEDURE — 7100000011 HC PHASE II RECOVERY - ADDTL 15 MIN: Performed by: ANESTHESIOLOGY

## 2025-05-21 PROCEDURE — 2709999900 HC NON-CHARGEABLE SUPPLY: Performed by: ANESTHESIOLOGY

## 2025-05-21 RX ORDER — FENTANYL CITRATE 50 UG/ML
INJECTION, SOLUTION INTRAMUSCULAR; INTRAVENOUS PRN
Status: DISCONTINUED | OUTPATIENT
Start: 2025-05-21 | End: 2025-05-21 | Stop reason: ALTCHOICE

## 2025-05-21 RX ORDER — LIDOCAINE HYDROCHLORIDE 10 MG/ML
INJECTION, SOLUTION EPIDURAL; INFILTRATION; INTRACAUDAL; PERINEURAL PRN
Status: DISCONTINUED | OUTPATIENT
Start: 2025-05-21 | End: 2025-05-21 | Stop reason: ALTCHOICE

## 2025-05-21 RX ORDER — MIDAZOLAM HYDROCHLORIDE 1 MG/ML
INJECTION, SOLUTION INTRAMUSCULAR; INTRAVENOUS PRN
Status: DISCONTINUED | OUTPATIENT
Start: 2025-05-21 | End: 2025-05-21 | Stop reason: ALTCHOICE

## 2025-05-21 RX ORDER — LIDOCAINE HYDROCHLORIDE 20 MG/ML
INJECTION, SOLUTION EPIDURAL; INFILTRATION; INTRACAUDAL; PERINEURAL PRN
Status: DISCONTINUED | OUTPATIENT
Start: 2025-05-21 | End: 2025-05-21 | Stop reason: ALTCHOICE

## 2025-05-21 ASSESSMENT — PAIN SCALES - GENERAL: PAINLEVEL_OUTOF10: 5

## 2025-05-21 ASSESSMENT — PAIN - FUNCTIONAL ASSESSMENT
PAIN_FUNCTIONAL_ASSESSMENT: 0-10
PAIN_FUNCTIONAL_ASSESSMENT: 0-10

## 2025-05-21 ASSESSMENT — PAIN DESCRIPTION - DESCRIPTORS: DESCRIPTORS: ACHING

## 2025-05-21 NOTE — PROGRESS NOTES
1138 Patient arrived to phase II via cart.  Spontaneous respiraitons even and unlabored.  Placed on monitor--VSS.  Report received from OR RN    1139 Assessment completed.  Patient is alert and oriented x4.  IV capped off-- no complications.  Patient denies pain--will monitor.  Injection sites clean and dry.      1142 Snack and drink provided. Pt. Denies all other needs at this time. Side rails up X2. Call light handed to pt. Bed locked and in low position.    1151 RN at bedside. Pt states readiness for discharge.    1152 Pt. Standing at bedside. With stand by assist of RN. Pt. Denies weakness or dizziness.    1154 INT removed no Complications noted.    1156 Pt. Getting self dressed. Family notified of discharge    1200 Pt. Ambulated to private vehicle in stable condition with stand by assist of RN.

## 2025-05-21 NOTE — PROCEDURES
Pre-operative Diagnosis: Lumbar facet pain     Post-operative Diagnosis: Lumbar facet pain     Procedure: RIGHT lumbar thermal radiofrequency ablation targeting facet joints L4/L5 and L5/S1    Procedure Description:  After consent was obtained, the patient was placed in the prone position with a pillow under the abdomen to reduce the lordotic curve of the lumbar spine. The lower back was prepped with chloraprep and draped in a sterile fashion.  Then, 0.5 cc of 1 % lidocaine was used for local anesthesia of the skin and superficial subcutaneous tissues.  Three 20-gauge 100mm SMK cannulas with 10-mm active tips were advanced under fluoroscopic guidance in an AP view to the junction of the superior articular process and the transverse process of the L4 and L5 vertebra and at the sacral ala. There were no paresthesias, heme or CSF obtained.  Needle placement was confirmed using AP and oblique views.      Sensory and motor stimulation at 50Hz and 2Hz and impedance measurements were carried out having reached threshold at:     RIGHT  L4: 0.2V/3V/150-300 Ohms  L5: 0.2V/3V/150-300 Ohms  SA: 0.2V/3V/150-300 Ohms        Then, 1cc of 2% Lidocaine was injected at the site. Temperature was then raised to 80 degrees centigrade for 90 seconds with a 15 second temperature ramp. No pain was reported during the lesioning. The needles were then withdrawn without complications. The patient tolerated the procedure well. The patient was transported to the recovery room and discharged in ambulatory fashion.    Procedural Complications: None  Estimated Blood Loss: 0 mL    IV sedation was used during the procedure:  - Moderate intravenous conscious sedation was supervised by Dr. Mccarty  - The patient was independently monitored by a Registered Nurse assigned to the procedure room  - Monitoring included automated blood pressure, continuous EKG, and continuous pulse oximetry  - The detailed conscious record is permanently stored in the

## 2025-05-21 NOTE — PRE SEDATION
[facility]  Pre-Sedation/Analgesia History & Physical    Pt Name: Leanne Diaz  MRN: [id number]  YOB: 1985  Provider Performing Procedure: Marlon Mccarty DO   Primary Care Physician: No primary care provider on file.      MEDICAL HISTORY       has a past medical history of ADHD (attention deficit hyperactivity disorder), Bipolar disorder (HCC), Chiari malformation, Depression, Fibromyalgia, and Pseudotumor cerebri.  SURGICAL HISTORY   has a past surgical history that includes Cholecystectomy (01/01/2007); other surgical history (01/01/2007); other surgical history; Ventriculoperitoneal shunt; Back Injection (Bilateral, 05/03/2022); Pain management procedure (N/A, 06/28/2022); Back Injection (Bilateral, 08/17/2022); Pain management procedure (N/A, 11/29/2022); Pain management procedure (Right, 01/18/2023); Tubal ligation (Bilateral, 2014); Pain management procedure (N/A, 2/14/2023); Pain management procedure (Bilateral, 3/28/2023); Pain management procedure (Bilateral, 5/2/2023); Pain management procedure (Right, 5/25/2023); and Pain management procedure (Right, 5/21/2025).    ALLERGIES   Allergies as of 05/05/2025    (No Known Allergies)       MEDICATIONS   Prior to Admission medications    Medication Sig Start Date End Date Taking? Authorizing Provider   omeprazole (PRILOSEC) 20 MG delayed release capsule Take 1 capsule by mouth daily   Yes ProviderAda MD   acetaminophen (TYLENOL) 325 MG tablet Take 2 tablets by mouth every 6 hours as needed for Pain   Yes Provider, MD Ada   ibuprofen (ADVIL;MOTRIN) 200 MG tablet Take 2 tablets by mouth every 6 hours as needed for Pain   Yes ProviderAda MD   Elastic Bandages & Supports (LUMBAR BACK BRACE/SUPPORT PAD) MISC 1 each by Does not apply route daily as needed (low back pain) 5/5/25   Sharon Foss APRN - CNP   amphetamine-dextroamphetamine (ADDERALL, 15MG,) 15 MG tablet Take 1 tablet by mouth daily for

## 2025-05-21 NOTE — POST SEDATION
[facility]  Sedation/Analgesia Post Sedation Record    Pt Name: Leanne Diaz  MRN: [id number]  YOB: 1985  Procedure Performed By: Marlon Mccarty DO  Primary Care Physician: No primary care provider on file.    POST-PROCEDURE    Physicians/Assistants: Marlon Mccarty DO  Procedure Performed: See Procedure Note   Sedation/Anesthesia: Versed and Fentanyl (See procedure note for amount and duration)  Estimated Blood Loss:     0  ml  Specimens Removed: None        Complications: None           Marlon Mccarty DO  Electronically signed 5/21/2025 at 2:53 PM

## 2025-07-02 ENCOUNTER — OFFICE VISIT (OUTPATIENT)
Dept: PHYSICAL MEDICINE AND REHAB | Age: 40
End: 2025-07-02
Payer: MEDICARE

## 2025-07-02 VITALS
DIASTOLIC BLOOD PRESSURE: 72 MMHG | SYSTOLIC BLOOD PRESSURE: 106 MMHG | WEIGHT: 232 LBS | BODY MASS INDEX: 39.61 KG/M2 | HEIGHT: 64 IN

## 2025-07-02 DIAGNOSIS — M47.816 LUMBAR SPONDYLOSIS: ICD-10-CM

## 2025-07-02 DIAGNOSIS — M54.17 RADICULOPATHY, LUMBOSACRAL REGION: Primary | ICD-10-CM

## 2025-07-02 DIAGNOSIS — G89.4 CHRONIC PAIN SYNDROME: ICD-10-CM

## 2025-07-02 DIAGNOSIS — M47.819 FACET ARTHROPATHY: ICD-10-CM

## 2025-07-02 PROCEDURE — G8417 CALC BMI ABV UP PARAM F/U: HCPCS | Performed by: NURSE PRACTITIONER

## 2025-07-02 PROCEDURE — 4004F PT TOBACCO SCREEN RCVD TLK: CPT | Performed by: NURSE PRACTITIONER

## 2025-07-02 PROCEDURE — G8427 DOCREV CUR MEDS BY ELIG CLIN: HCPCS | Performed by: NURSE PRACTITIONER

## 2025-07-02 PROCEDURE — 99214 OFFICE O/P EST MOD 30 MIN: CPT | Performed by: NURSE PRACTITIONER

## 2025-07-02 NOTE — PROGRESS NOTES
Functionality Assessment/Goals Worksheet     On a scale of 0 (Does not Interfere) to 10 (Completely Interferes)     1.  Which number describes how during the past week pain has interfered with           the following:  A.  General Activity:  3  B.  Mood: 4  C.  Walking Ability:  3  D.  Normal Work (Includes both work outside the home and housework):  3  E.  Relations with Other People:   0  F.  Sleep:   5  G.  Enjoyment of Life:   3    2.  Patient Prefers to Take their Pain Medications:     []  On a regular basis   []  Only when necessary    [x]  Does not take pain medications    3.  What are the Patient's Goals/Expectations for Visiting Pain Management?     []  Learn about my pain    []  Receive Medication   []  Physical Therapy     []  Treat Depression   [x]  Receive Injections    []  Treat Sleep   []  Deal with Anxiety and Stress   []  Treat Opoid Dependence/Addiction   []  Other:                                
spine at L4-5       There are no gross abnormalities within the spinal canal.       On the axial images, at T11-T12, T12-L1 and L1-L2, there is no significant disc herniation, canal or foraminal stenosis.       At L2-3, there is a 2.6 mm disc protrusion. This results in mild-to-moderate canal and bilateral foraminal stenosis.       At L3-4, there is no disc herniation, canal or foraminal stenosis.       At L4-5, there is a 4 mm disc protrusion and facet hypertrophy this results in moderate severe canal and bilateral foraminal stenosis.       At L5-S1, there is a 1.8 mm disc protrusion and facet hypertrophy. This results in mild canal and moderate bilateral foraminal stenosis.       There is mild degenerative change involving the sacroiliac joints bilaterally       There is a 16 mm sclerotic lesion. In the left iliac crest.       There is a possible intraperitoneal catheter present.               Impression       1. Degenerative changes most marked at L4-5, at which level there is moderate to severe canal and bilateral foraminal stenosis. There appears to be an intraspinal catheter entering the spine at L4-5.   2. There is mild canal and moderate bilateral foraminal stenosis at L5-S1..   3. There is mild-to-moderate canal and bilateral foraminal stenosis at L2-3.   4. There is degenerative change involving the sacroiliac joints bilaterally.   5. There is a 16 mm sclerotic lesion involving the left iliac crest.   6. There is a possible intraperitoneal catheter present..                   **This report has been created using voice recognition software. It may contain minor errors which are inherent in voice recognition technology.**       Final report electronically signed by DR JENNIFER PRITCHARD on 1/20/2022 11:55 AM         Past Medical History:   Diagnosis Date    ADHD (attention deficit hyperactivity disorder)     Bipolar disorder (HCC)     Chiari malformation     Depression     Fibromyalgia     Pseudotumor cerebri

## 2025-07-29 NOTE — H&P
Cranial nerves II-XII grossly intact.   · Gait - Antalgic gait. Ambulates with assistive device (cane on occasion).   · Motor: 3/5 muscle strength in bilateral hip flexion, knee flexion, knee extension, ankle dorsiflexion, and ankle plantar flexion (pain limited)  · Sensory: LT sensation intact in lower limbs, decreased sensation to right L3, L5 dermatomal distribution  · Reflexes: 2+ symmetrical in bilateral achilles, 2+ bilateral patellar, negative ankle clonus, downgoing babinski   Skin: No rashes or lesions present   Psychological: Cooperative, no exaggerated pain behaviors     Assessment:    Diagnosis Orders   1. Radiculopathy, lumbosacral region  SCHEDULE PROCEDURE    SCHEDULE PROCEDURE      2. Chronic pain syndrome        3. Lumbar spondylosis        4. Facet arthropathy            Leanne Diaz is a 40 y.o.female presenting to the pain clinic for evaluation of low back and right leg pain.  Patient's history and physical consistent with bilateral sacroiliac joint dysfunction as well as lumbar radiculopathy.  She responded significantly to right SI RFA. She has responded well to lumbar epidurals in the past but now has not noticed as much relief.  She had a significant response to the diagnostic and confirmatory lumbar facet medial branch block at bilateral L4-5 and L5-S1 with Dr. Mccarty.  She completed the right lumbar RFA, I have set her up for the left-sided lumbar radiofrequency ablation at L4/5 and L5/S1. I have continued her ibuprofen and robaxin.  We will continue to monitor the numbness that she is having in her leg.    Interval update 07/02/2025:She has SI joint dysfunction, facet mediated pain, radicular symptoms.  She has failed to have significant relief to epidurals in the past, while she does have ongoing radicular symptoms.  She had significant relief with her lumbar radiofrequency ablation targeting right L4-5 and L5-S1.  I have set her up for a transforaminal lumbar epidural steroid  injection targeting right L4-5 with Dr. Mccarty with fluoroscopy.  Pending her response we may repeat a right SI joint injection.  She continues to comply to her home exercise program. She does have stenosis as well as facet hypertrophy on her imaging.  She can continue ibuprofen and Tylenol.   She has failed to respond to activity modification, NSAID therapy, physical therapy in the past.  I have ordered a lumbar x-ray for updated imaging.    Plan:   The following treatment recommendations and plan were discussed in detail with Leanne Diaz.    Imaging:   I have reviewed patient’s imaging of lumbar CT and results were discussed with patient today.     Analgesics:   Patient is taking Acetaminophen. Patient informed that the maximum amount of acetaminophen taken on a regular basis should only be 4000 mg per day.     Adjuvants:   PROCEDURES ONLY    Interventions:   In presence of lumbosacral radiating pain, the option of transforaminal lumbar epidural steroid injection approach at RIGHT Lumbar 4-5 was chosen. The risks and benefits were discussed in detail with the patient. Patient wants to proceed with the injection.    Anticoagulation/NPO Recommendations:   Patient does need to hold Ibuprofen x 2 days prior to the procedure.  Patient does need to be NPO x8 hours prior to the procedure.  We will start an IV for the procedure  Patient will need a     Multidisciplinary Pain Management:   In the presence of complex, chronic, and multi-factorial pain, the importance of a multidisciplinary approach to pain management in the patient’s management regimen was emphasized and discussed in great detail.   PHYSICAL THERAPY: Patient is advised to see a physical therapist for gentle stretching exercises and conditioning exercises for management of pain.   PSYCHOLOGY: Patient is advised to see a clinical pain psychologist, for the psychosocial aspect of pain care through coping skills, relaxation strategies, cognitive

## 2025-07-29 NOTE — DISCHARGE INSTRUCTIONS
Post procedure Instructions:    No driving or making significant decisions for the remainder of the day.   Be cautions with walking and activity for 24 hours, do not over exert yourself.  Ok to resume pre-procedure activity level today.  Apply ice to site of injection site if you have pain or discomfort.  Do not submerge sit of injection during bath or pool activities for 48 hours post-procedure.  Resume blood thinning medications in 24 hours.     Call office [phone] if you have:  Temperature greater than 100.4  Persistent nausea and vomiting  Severe uncontrolled pain  Redness, tenderness, or signs of infection (pain, swelling, redness, odor or green/yellow discharge around the site)  Difficulty breathing, headache or visual disturbances  Hives  Persistent dizziness or light-headedness  Extreme fatigue  Any other questions or concerns you may have after discharge    In an emergency, call 911 or go to an Emergency Department at a nearby hospital    “Surgical Site Infections”      How can we work together to prevent Surgical Site Infections?   We would like to thank you for choosing [facility] for your Surgical Care.  Below you will find helpful information on how we can work together to prevent Surgical Site Infections.    What is a Surgical Site Infection (SSI)?  A surgical site infection is an infection that occurs after surgery in the part of the body where the surgery took place. Most patients who have surgery do not develop an infection. However, infections develop in about 1 to 3 out of every 100 patients who have surgery.  Some of the common symptoms of a surgical site infection are:  Redness and pain around the area where you had surgery  Drainage of cloudy fluid from your surgical wound  Fever    Can SSIs be treated?  Yes. Most surgical site infections can be treated with antibiotics. The antibiotic given to you depends on the bacteria (germs) causing the infection. Sometimes patients with

## 2025-07-30 ENCOUNTER — HOSPITAL ENCOUNTER (OUTPATIENT)
Age: 40
Setting detail: OUTPATIENT SURGERY
Discharge: HOME | End: 2025-07-30
Attending: ANESTHESIOLOGY | Admitting: ANESTHESIOLOGY
Payer: MEDICARE

## 2025-07-30 ENCOUNTER — APPOINTMENT (OUTPATIENT)
Dept: GENERAL RADIOLOGY | Age: 40
End: 2025-07-30
Attending: ANESTHESIOLOGY
Payer: MEDICARE

## 2025-07-30 VITALS
SYSTOLIC BLOOD PRESSURE: 99 MMHG | OXYGEN SATURATION: 97 % | HEIGHT: 64 IN | HEART RATE: 62 BPM | BODY MASS INDEX: 37.97 KG/M2 | DIASTOLIC BLOOD PRESSURE: 56 MMHG | RESPIRATION RATE: 16 BRPM | TEMPERATURE: 97.7 F | WEIGHT: 222.4 LBS

## 2025-07-30 PROBLEM — M54.16 LUMBAR RADICULOPATHY: Status: ACTIVE | Noted: 2025-07-30

## 2025-07-30 PROCEDURE — 7100000010 HC PHASE II RECOVERY - FIRST 15 MIN: Performed by: ANESTHESIOLOGY

## 2025-07-30 PROCEDURE — 99152 MOD SED SAME PHYS/QHP 5/>YRS: CPT | Performed by: ANESTHESIOLOGY

## 2025-07-30 PROCEDURE — 64483 NJX AA&/STRD TFRM EPI L/S 1: CPT | Performed by: ANESTHESIOLOGY

## 2025-07-30 PROCEDURE — 3600000054 HC PAIN LEVEL 3 BASE: Performed by: ANESTHESIOLOGY

## 2025-07-30 PROCEDURE — 7100000011 HC PHASE II RECOVERY - ADDTL 15 MIN: Performed by: ANESTHESIOLOGY

## 2025-07-30 PROCEDURE — 2709999900 HC NON-CHARGEABLE SUPPLY: Performed by: ANESTHESIOLOGY

## 2025-07-30 PROCEDURE — 6360000004 HC RX CONTRAST MEDICATION: Performed by: ANESTHESIOLOGY

## 2025-07-30 PROCEDURE — 6360000002 HC RX W HCPCS: Performed by: ANESTHESIOLOGY

## 2025-07-30 RX ORDER — LIDOCAINE HYDROCHLORIDE 10 MG/ML
INJECTION, SOLUTION EPIDURAL; INFILTRATION; INTRACAUDAL; PERINEURAL PRN
Status: DISCONTINUED | OUTPATIENT
Start: 2025-07-30 | End: 2025-07-30 | Stop reason: ALTCHOICE

## 2025-07-30 RX ORDER — IOPAMIDOL 612 MG/ML
INJECTION, SOLUTION INTRAVASCULAR PRN
Status: DISCONTINUED | OUTPATIENT
Start: 2025-07-30 | End: 2025-07-30 | Stop reason: ALTCHOICE

## 2025-07-30 RX ORDER — BUPIVACAINE HYDROCHLORIDE 5 MG/ML
INJECTION, SOLUTION PERINEURAL PRN
Status: DISCONTINUED | OUTPATIENT
Start: 2025-07-30 | End: 2025-07-30 | Stop reason: ALTCHOICE

## 2025-07-30 RX ORDER — MIDAZOLAM HYDROCHLORIDE 1 MG/ML
INJECTION, SOLUTION INTRAMUSCULAR; INTRAVENOUS PRN
Status: DISCONTINUED | OUTPATIENT
Start: 2025-07-30 | End: 2025-07-30 | Stop reason: ALTCHOICE

## 2025-07-30 RX ORDER — FENTANYL CITRATE 50 UG/ML
INJECTION, SOLUTION INTRAMUSCULAR; INTRAVENOUS PRN
Status: DISCONTINUED | OUTPATIENT
Start: 2025-07-30 | End: 2025-07-30 | Stop reason: ALTCHOICE

## 2025-07-30 RX ORDER — DEXAMETHASONE SODIUM PHOSPHATE 4 MG/ML
INJECTION, SOLUTION INTRA-ARTICULAR; INTRALESIONAL; INTRAMUSCULAR; INTRAVENOUS; SOFT TISSUE PRN
Status: DISCONTINUED | OUTPATIENT
Start: 2025-07-30 | End: 2025-07-30 | Stop reason: ALTCHOICE

## 2025-07-30 ASSESSMENT — PAIN - FUNCTIONAL ASSESSMENT: PAIN_FUNCTIONAL_ASSESSMENT: 0-10

## 2025-07-30 ASSESSMENT — PAIN SCALES - GENERAL: PAINLEVEL_OUTOF10: 3

## 2025-07-30 NOTE — PROGRESS NOTES
0814: Patient arrives to recovery room via cart.  Spontaneous respirations, vss, report received from surgical RN.  Patient denies pain, numbness, tingling , nausea.  Injection site clean, dry, intact. HOB elevated. IV capped. Snack and drink given.  Call light within reach.      0830: IV removed.  Patient sitting at edge of bed getting dressed. Ride called.     0845: patient wheeled to discharge lobby in stable condition.  Patient discharged home with all personal belongings.

## 2025-07-30 NOTE — PRE SEDATION
[facility]  Pre-Sedation/Analgesia History & Physical    Pt Name: Leanne Diaz  MRN: [id number]  YOB: 1985  Provider Performing Procedure: Maroln Mccarty DO   Primary Care Physician: No primary care provider on file.      MEDICAL HISTORY       has a past medical history of ADHD (attention deficit hyperactivity disorder), Bipolar disorder (HCC), Chiari malformation, Depression, Fibromyalgia, and Pseudotumor cerebri.  SURGICAL HISTORY   has a past surgical history that includes Cholecystectomy (01/01/2007); other surgical history (01/01/2007); other surgical history; Ventriculoperitoneal shunt; Back Injection (Bilateral, 05/03/2022); Pain management procedure (N/A, 06/28/2022); Back Injection (Bilateral, 08/17/2022); Pain management procedure (N/A, 11/29/2022); Pain management procedure (Right, 01/18/2023); Tubal ligation (Bilateral, 2014); Pain management procedure (N/A, 2/14/2023); Pain management procedure (Bilateral, 3/28/2023); Pain management procedure (Bilateral, 5/2/2023); Pain management procedure (Right, 5/25/2023); Pain management procedure (Right, 5/21/2025); and Pain management procedure (Right, 7/30/2025).    ALLERGIES   Allergies as of 07/02/2025    (No Known Allergies)       MEDICATIONS   Prior to Admission medications    Medication Sig Start Date End Date Taking? Authorizing Provider   omeprazole (PRILOSEC) 20 MG delayed release capsule Take 1 capsule by mouth daily   Yes ProviderAda MD   acetaminophen (TYLENOL) 325 MG tablet Take 2 tablets by mouth every 6 hours as needed for Pain   Yes Provider, MD Ada   Elastic Bandages & Supports (LUMBAR BACK BRACE/SUPPORT PAD) MISC 1 each by Does not apply route daily as needed (low back pain) 5/5/25  Yes Sharon Foss APRN - CNP   ibuprofen (ADVIL;MOTRIN) 200 MG tablet Take 2 tablets by mouth every 6 hours as needed for Pain    ProviderAda MD   amphetamine-dextroamphetamine (ADDERALL, 15MG,)

## 2025-07-30 NOTE — PROCEDURES
Pre-operative Diagnosis:  lumbar radicular leg pain     Post-operative Diagnosis:  lumbar radicular leg pain     Procedure: RIGHT L4 transforaminal epidural steroid injection     Procedure Description:  The patient was taken to the fluoroscopy suite and placed in a prone position on the fluoroscopy table. A pillow was placed under the abdomen to reduce the lumbar lordosis. The patient was monitored using electrocardiogram, noninvasive blood pressure and pulse oximeter.  The skin over the lumbar spine was prepped with Chloraprep and draped in a sterile fashion.     The fluoroscope was brought into the field and a PA image was obtained to identify the L4 vertebral body.  The anterior and posterior aspects of the superior endplate of the vertebral body were superimposed upon one another.  The fluoroscope was then rotated in an oblique angle toward the RIGHT side such that the superior articulating process was positioned midway between the anterior and posterior aspects of the vertebral body superior endplate.  A point on the skin overlying the proposed site of needle entry just inferolateral to the pedicle was marked.  The skin and subcutaneous tissues overlying the proposed needle entry site were anesthetized with 2 cc of 1% lidocaine.  A 22 gauge, 5 inch spinal needle was placed through the skin and advanced coaxially.  Needle depth was periodically evaluated using AP fluoroscopic imaging.  Correct final needle position was confirmed with AP fluoroscopic imaging.  0.5 cc of Omnipaque 300 contrast dye was injected under live fluoroscopy to confirm appropriate spread of contrast and to rule out intravascular or intrathecal spread. 10 mg dexamethasone was injected following negative aspiration for heme, CSF and air followed by 1 cc of 0.5% bupivacaine. The injection site was cleaned and dried. The patient was transferred to the recovery area and discharged in stable condition per protocol.     Procedural Complications:

## 2025-07-30 NOTE — POST SEDATION
[facility]  Sedation/Analgesia Post Sedation Record    Pt Name: Leanne Diaz  MRN: [id number]  YOB: 1985  Procedure Performed By: Marlon Mccarty DO  Primary Care Physician: No primary care provider on file.    POST-PROCEDURE    Physicians/Assistants: Marlon Mccarty DO  Procedure Performed: See Procedure Note   Sedation/Anesthesia: Versed and Fentanyl (See procedure note for amount and duration)  Estimated Blood Loss:     0  ml  Specimens Removed: None        Complications: None           Marlon Mccarty DO  Electronically signed 7/30/2025 at 1:33 PM     Per pt's spouse wanting to know if she will need to drive the pt to procedure on 2/19, or will pt be okay to drive.  Please advise

## (undated) DEVICE — HYPODERMIC SAFETY NEEDLE: Brand: MAGELLAN

## (undated) DEVICE — GLOVE ORANGE PI 8 1/2   MSG9085

## (undated) DEVICE — SYRINGE MED 10ML LUERLOCK TIP W/O SFTY DISP

## (undated) DEVICE — GAUZE SPONGES,USP TYPE VII GAUZE, 12 PLY: Brand: CURITY

## (undated) DEVICE — 3 ML SYRINGE LUER-LOCK TIP: Brand: MONOJECT

## (undated) DEVICE — TOWEL,OR,DSP,ST,BLUE,STD,4/PK,20PK/CS: Brand: MEDLINE

## (undated) DEVICE — NEEDLE HYPO 18GA L1.5IN THN WALL PIVOTING SHLD BVL ORIENTED

## (undated) DEVICE — 6 ML SYRINGE LUER-LOCK TIP: Brand: MONOJECT

## (undated) DEVICE — SYRINGE MED 3ML CLR PLAS STD N CTRL LUERLOCK TIP DISP

## (undated) DEVICE — 1840 FOAM BLOCK NEEDLE COUNTER: Brand: DEVON

## (undated) DEVICE — Device

## (undated) DEVICE — MARKER,SKIN,WI/RULER AND LABELS: Brand: MEDLINE

## (undated) DEVICE — APPLICATOR MEDICATED 3 CC SOLUTION CLR STRL CHLORAPREP

## (undated) DEVICE — GLOVE BIOGEL POWDER FREE SZ 8

## (undated) DEVICE — COUNTER NDL 10 COUNT HLD 20 FOAM BLK SGL MAG

## (undated) DEVICE — NEEDLE SPNL 22 GAX5 IN HUB QNCKE FUNNEL SHP STYL BLK SPINCAN

## (undated) DEVICE — NEEDLE SPINAL 22GA L3.5IN SPINOCAN

## (undated) DEVICE — APPLICATOR MEDICATED 26 CC SOLUTION HI LT ORNG CHLORAPREP

## (undated) DEVICE — SYRINGE MEDICAL 3ML CLEAR PLASTIC STANDARD NON CONTROL LUERLOCK TIP DISPOSABLE

## (undated) DEVICE — SC PAIN PACK: Brand: MEDLINE INDUSTRIES, INC.

## (undated) DEVICE — NEEDLE SPNL 22GA L3.5IN BLK HUB S STL REG WALL FIT STYL W/

## (undated) DEVICE — APPLICATOR MEDICATED 10.5 CC SOLUTION CLR STRL CHLORAPREP